# Patient Record
Sex: FEMALE | Race: WHITE | NOT HISPANIC OR LATINO | Employment: UNEMPLOYED | URBAN - NONMETROPOLITAN AREA
[De-identification: names, ages, dates, MRNs, and addresses within clinical notes are randomized per-mention and may not be internally consistent; named-entity substitution may affect disease eponyms.]

---

## 2017-01-23 ENCOUNTER — PRENATAL OFFICE VISIT - GICH (OUTPATIENT)
Dept: OBGYN | Facility: OTHER | Age: 27
End: 2017-01-23

## 2017-01-23 ENCOUNTER — AMBULATORY - GICH (OUTPATIENT)
Dept: SCHEDULING | Facility: OTHER | Age: 27
End: 2017-01-23

## 2017-01-23 ENCOUNTER — HISTORY (OUTPATIENT)
Dept: OBGYN | Facility: OTHER | Age: 27
End: 2017-01-23

## 2017-01-23 ENCOUNTER — COMMUNICATION - GICH (OUTPATIENT)
Dept: OBGYN | Facility: OTHER | Age: 27
End: 2017-01-23

## 2017-01-23 DIAGNOSIS — O99.331 TOBACCO SMOKING COMPLICATING PREGNANCY IN FIRST TRIMESTER: ICD-10-CM

## 2017-01-23 DIAGNOSIS — Z34.81 ENCOUNTER FOR SUPERVISION OF OTHER NORMAL PREGNANCY, FIRST TRIMESTER: ICD-10-CM

## 2017-01-23 DIAGNOSIS — F12.10 CANNABIS ABUSE, UNCOMPLICATED: ICD-10-CM

## 2017-01-23 DIAGNOSIS — N39.0 URINARY TRACT INFECTION: ICD-10-CM

## 2017-01-23 LAB
ABORH - HISTORICAL: NORMAL
ABSOLUTE BASOPHILS - HISTORICAL: 0.1 THOU/CU MM
ABSOLUTE EOSINOPHILS - HISTORICAL: 0.1 THOU/CU MM
ABSOLUTE LYMPHOCYTES - HISTORICAL: 0.9 THOU/CU MM (ref 0.9–2.9)
ABSOLUTE MONOCYTES - HISTORICAL: 0.6 THOU/CU MM
ABSOLUTE NEUTROPHILS - HISTORICAL: 4.2 THOU/CU MM (ref 1.7–7)
ANTIBODY SCREEN - HISTORICAL: NEGATIVE
BASOPHILS # BLD AUTO: 2.3 %
EOSINOPHIL NFR BLD AUTO: 2.1 %
ERYTHROCYTE [DISTWIDTH] IN BLOOD BY AUTOMATED COUNT: 12.9 % (ref 11.5–15.5)
HBSAG CATEGORY - HISTORICAL: NONREACTIVE
HCT VFR BLD AUTO: 40.3 % (ref 33–51)
HEMOGLOBIN: 13.5 G/DL (ref 12–16)
HIV-1/HIV-2 ANTIBODY CATEGORY - HISTORICAL: NORMAL
LYMPHOCYTES NFR BLD AUTO: 15.5 % (ref 20–44)
MCH RBC QN AUTO: 27.9 PG (ref 26–34)
MCHC RBC AUTO-ENTMCNC: 33.5 G/DL (ref 32–36)
MCV RBC AUTO: 83 FL (ref 80–100)
MONOCYTES NFR BLD AUTO: 10 %
NEUTROPHILS NFR BLD AUTO: 70.1 % (ref 42–72)
PLATELET # BLD AUTO: 239 THOU/CU MM (ref 140–440)
PMV BLD: 9.7 FL (ref 6.5–11)
RED BLOOD COUNT - HISTORICAL: 4.84 MIL/CU MM (ref 4–5.2)
RUBELLA COMMENT - HISTORICAL: NORMAL
SPECIMEN EXPIRATION DATE/TIME - HISTORICAL: NORMAL
WEAK D - HISTORICAL: NEGATIVE
WHITE BLOOD COUNT - HISTORICAL: 5.9 THOU/CU MM (ref 4.5–11)

## 2017-01-24 LAB — TREPONEMA PALLIDUM - HISTORICAL: NEGATIVE

## 2017-01-25 LAB
CULTURE - HISTORICAL: ABNORMAL
CULTURE - HISTORICAL: ABNORMAL
SUSCEPTIBILITY RESULT - HISTORICAL: ABNORMAL

## 2017-01-31 ENCOUNTER — AMBULATORY - GICH (OUTPATIENT)
Dept: SCHEDULING | Facility: OTHER | Age: 27
End: 2017-01-31

## 2017-01-31 LAB
6-MONOACETYL MORPHINE - HISTORICAL: ABNORMAL NG/ML
AMPHETAMINE URINE - HISTORICAL: ABNORMAL NG/ML
BARBITURATE URINE - HISTORICAL: ABNORMAL NG/ML
BENZODIAZEPINE URINE - HISTORICAL: ABNORMAL NG/ML
BUPRENORPHRINE URINE - HISTORICAL: ABNORMAL NG/ML
COCAINE METAB URINE - HISTORICAL: ABNORMAL NG/ML
CREAT UR - HISTORICAL: 64 MG/DL
ETHYLGLUCURONIDE URINE - HISTORICAL: ABNORMAL NG/ML
FENTANYL URINE - HISTORICAL: ABNORMAL NG/ML
MASS SPECTROMETRY URINE - HISTORICAL: ABNORMAL
METHADONE URINE - HISTORICAL: ABNORMAL NG/ML
OPIATES URINE - HISTORICAL: ABNORMAL NG/ML
OXYCODONE URINE - HISTORICAL: ABNORMAL NG/ML
PH URINE - HISTORICAL: 7.6
PROPOXYPHENE URINE - HISTORICAL: ABNORMAL NG/ML
THC 50 URINE - HISTORICAL: ABNORMAL NG/ML
TRAMADOL - HISTORICAL: ABNORMAL NG/ML

## 2017-02-09 ENCOUNTER — COMMUNICATION - GICH (OUTPATIENT)
Dept: OBGYN | Facility: OTHER | Age: 27
End: 2017-02-09

## 2017-02-20 ENCOUNTER — HISTORY (OUTPATIENT)
Dept: OBGYN | Facility: OTHER | Age: 27
End: 2017-02-20

## 2017-02-20 ENCOUNTER — AMBULATORY - GICH (OUTPATIENT)
Dept: OBGYN | Facility: OTHER | Age: 27
End: 2017-02-20

## 2017-02-20 ENCOUNTER — HOSPITAL ENCOUNTER (OUTPATIENT)
Dept: RADIOLOGY | Facility: OTHER | Age: 27
End: 2017-02-20
Attending: OBSTETRICS & GYNECOLOGY

## 2017-02-20 ENCOUNTER — PRENATAL OFFICE VISIT - GICH (OUTPATIENT)
Dept: OBGYN | Facility: OTHER | Age: 27
End: 2017-02-20

## 2017-02-20 DIAGNOSIS — Z34.81 ENCOUNTER FOR SUPERVISION OF OTHER NORMAL PREGNANCY, FIRST TRIMESTER: ICD-10-CM

## 2017-02-20 DIAGNOSIS — O34.41 MATERNAL CARE FOR OTHER ABNORMALITIES OF CERVIX, FIRST TRIMESTER: ICD-10-CM

## 2017-02-20 DIAGNOSIS — O09.891 SUPERVISION OF OTHER HIGH RISK PREGNANCIES, FIRST TRIMESTER: ICD-10-CM

## 2017-02-20 DIAGNOSIS — O23.41 INFECTION OF URINARY TRACT IN PREGNANCY IN FIRST TRIMESTER: ICD-10-CM

## 2017-02-20 DIAGNOSIS — F12.10 CANNABIS ABUSE, UNCOMPLICATED: ICD-10-CM

## 2017-03-28 ENCOUNTER — PRENATAL OFFICE VISIT - GICH (OUTPATIENT)
Dept: OBGYN | Facility: OTHER | Age: 27
End: 2017-03-28

## 2017-03-28 ENCOUNTER — HISTORY (OUTPATIENT)
Dept: OBGYN | Facility: OTHER | Age: 27
End: 2017-03-28

## 2017-03-28 DIAGNOSIS — F12.10 CANNABIS ABUSE, UNCOMPLICATED: ICD-10-CM

## 2017-03-28 DIAGNOSIS — Z34.81 ENCOUNTER FOR SUPERVISION OF OTHER NORMAL PREGNANCY, FIRST TRIMESTER: ICD-10-CM

## 2017-03-28 ASSESSMENT — PATIENT HEALTH QUESTIONNAIRE - PHQ9: SUM OF ALL RESPONSES TO PHQ QUESTIONS 1-9: 1

## 2017-04-13 ENCOUNTER — COMMUNICATION - GICH (OUTPATIENT)
Dept: OBGYN | Facility: OTHER | Age: 27
End: 2017-04-13

## 2017-04-13 ENCOUNTER — OFFICE VISIT - GICH (OUTPATIENT)
Dept: FAMILY MEDICINE | Facility: OTHER | Age: 27
End: 2017-04-13

## 2017-04-13 ENCOUNTER — HISTORY (OUTPATIENT)
Dept: FAMILY MEDICINE | Facility: OTHER | Age: 27
End: 2017-04-13

## 2017-04-13 DIAGNOSIS — O99.89 OTHER SPECIFIED DISEASES AND CONDITIONS COMPLICATING PREGNANCY, CHILDBIRTH AND THE PUERPERIUM (CODE): ICD-10-CM

## 2017-04-13 DIAGNOSIS — K59.00 CONSTIPATION: ICD-10-CM

## 2017-04-13 DIAGNOSIS — M54.9 DORSALGIA: ICD-10-CM

## 2017-04-13 LAB
BACTERIA URINE: ABNORMAL BACTERIA/HPF
BILIRUB UR QL: NEGATIVE
CLARITY, URINE: CLEAR CLARITY
COLOR UR: YELLOW COLOR
EPITHELIAL CELLS: ABNORMAL EPI/HPF
GLUCOSE URINE: NEGATIVE MG/DL
KETONES UR QL: NEGATIVE MG/DL
LEUKOCYTE ESTERASE URINE: ABNORMAL
NITRITE UR QL STRIP: NEGATIVE
OCCULT BLOOD,URINE - HISTORICAL: NEGATIVE
PH UR: 6 [PH]
PROTEIN QUALITATIVE,URINE - HISTORICAL: NEGATIVE MG/DL
RBC - HISTORICAL: ABNORMAL /HPF
SP GR UR STRIP: 1.02
UROBILINOGEN,QUALITATIVE - HISTORICAL: NORMAL EU/DL
WBC - HISTORICAL: ABNORMAL /HPF

## 2017-04-20 ENCOUNTER — OFFICE VISIT - GICH (OUTPATIENT)
Dept: CHIROPRACTIC MEDICINE | Facility: OTHER | Age: 27
End: 2017-04-20

## 2017-04-20 ENCOUNTER — HISTORY (OUTPATIENT)
Dept: CHIROPRACTIC MEDICINE | Facility: OTHER | Age: 27
End: 2017-04-20

## 2017-04-20 DIAGNOSIS — M99.02 SEGMENTAL AND SOMATIC DYSFUNCTION OF THORACIC REGION: ICD-10-CM

## 2017-04-20 DIAGNOSIS — M54.50 LOW BACK PAIN: ICD-10-CM

## 2017-04-20 DIAGNOSIS — O99.89 OTHER SPECIFIED DISEASES AND CONDITIONS COMPLICATING PREGNANCY, CHILDBIRTH AND THE PUERPERIUM (CODE): ICD-10-CM

## 2017-04-20 DIAGNOSIS — M54.9 DORSALGIA: ICD-10-CM

## 2017-04-20 DIAGNOSIS — M99.04 SEGMENTAL AND SOMATIC DYSFUNCTION OF SACRAL REGION: ICD-10-CM

## 2017-04-20 DIAGNOSIS — M99.03 SEGMENTAL AND SOMATIC DYSFUNCTION OF LUMBAR REGION: ICD-10-CM

## 2017-04-20 DIAGNOSIS — M54.6 PAIN IN THORACIC SPINE: ICD-10-CM

## 2017-04-20 DIAGNOSIS — S33.6XXA SPRAIN OF SACROILIAC JOINT: ICD-10-CM

## 2017-04-20 DIAGNOSIS — M99.05 SEGMENTAL AND SOMATIC DYSFUNCTION OF PELVIC REGION: ICD-10-CM

## 2017-04-20 DIAGNOSIS — M62.838 OTHER MUSCLE SPASM: ICD-10-CM

## 2017-04-24 ENCOUNTER — OFFICE VISIT - GICH (OUTPATIENT)
Dept: CHIROPRACTIC MEDICINE | Facility: OTHER | Age: 27
End: 2017-04-24

## 2017-04-24 DIAGNOSIS — M54.6 PAIN IN THORACIC SPINE: ICD-10-CM

## 2017-04-24 DIAGNOSIS — M99.04 SEGMENTAL AND SOMATIC DYSFUNCTION OF SACRAL REGION: ICD-10-CM

## 2017-04-24 DIAGNOSIS — M99.05 SEGMENTAL AND SOMATIC DYSFUNCTION OF PELVIC REGION: ICD-10-CM

## 2017-04-24 DIAGNOSIS — M99.02 SEGMENTAL AND SOMATIC DYSFUNCTION OF THORACIC REGION: ICD-10-CM

## 2017-04-24 DIAGNOSIS — S33.6XXA SPRAIN OF SACROILIAC JOINT: ICD-10-CM

## 2017-04-24 DIAGNOSIS — M62.838 OTHER MUSCLE SPASM: ICD-10-CM

## 2017-04-24 DIAGNOSIS — M54.50 LOW BACK PAIN: ICD-10-CM

## 2017-04-25 ENCOUNTER — HISTORY (OUTPATIENT)
Dept: EMERGENCY MEDICINE | Facility: OTHER | Age: 27
End: 2017-04-25

## 2017-04-28 ENCOUNTER — HOSPITAL ENCOUNTER (OUTPATIENT)
Dept: RADIOLOGY | Facility: OTHER | Age: 27
End: 2017-04-28
Attending: OBSTETRICS & GYNECOLOGY

## 2017-04-28 DIAGNOSIS — Z34.81 ENCOUNTER FOR SUPERVISION OF OTHER NORMAL PREGNANCY, FIRST TRIMESTER: ICD-10-CM

## 2017-05-01 ENCOUNTER — COMMUNICATION - GICH (OUTPATIENT)
Dept: OBGYN | Facility: OTHER | Age: 27
End: 2017-05-01

## 2017-06-27 ENCOUNTER — AMBULATORY - GICH (OUTPATIENT)
Dept: SCHEDULING | Facility: OTHER | Age: 27
End: 2017-06-27

## 2017-06-28 ENCOUNTER — COMMUNICATION - GICH (OUTPATIENT)
Dept: FAMILY MEDICINE | Facility: OTHER | Age: 27
End: 2017-06-28

## 2017-12-28 NOTE — TELEPHONE ENCOUNTER
Patient Information     Patient Name MRN Priscilla Hills [id number] Female 1990      Telephone Encounter by Tahmina Garcia at 2017 10:24 AM     Author:  Tahmina Garcia Service:  (none) Author Type:  (none)     Filed:  2017 10:29 AM Encounter Date:  2017 Status:  Signed     :  Tahmina Garcia            JOSS CALLED FROM [facility] REQUESTING A REFERRAL FOR THIS PATIENT. IT IS UNKNOWN WHY SHE WAS IN THAT AREA. SHE WAS SEEN FOR Z34.83 SUPERVISION OF NORMAL PREGNANCY AT 29 WEEKS WITH KELTON RADFORD ON 2017. WILL YOU APPROVE THIS REFERRAL? THANK YOU, Tahmina Garcia ....................  2017   10:28 AM

## 2017-12-28 NOTE — TELEPHONE ENCOUNTER
Patient Information     Patient Name MRN Sex Priscilla Nichole [id number] Female 1990      Telephone Encounter by Zayra Dugan MD at 2017 12:16 PM     Author:  Zayra Dugan MD Service:  (none) Author Type:  Physician     Filed:  2017 12:17 PM Encounter Date:  2017 Status:  Signed     :  Zayra Dugan MD (Physician)            Ok.  Zayra Dugan MD ....................  2017   12:16 PM

## 2017-12-28 NOTE — TELEPHONE ENCOUNTER
Patient Information     Patient Name MRN Sex Priscilla Nichole [id number] Female 1990      Telephone Encounter by Joshua Rasmussen LPN at 2017 12:20 PM     Author:  Joshua Rasmussen LPN Service:  (none) Author Type:  NURS- Licensed Practical Nurse     Filed:  2017 12:20 PM Encounter Date:  2017 Status:  Signed     :  Joshua Rasmussen LPN (NURS- Licensed Practical Nurse)            Called [place] and let them know the referral was placed.  Joshua Rasmussen LPN ..............2017 12:20 PM

## 2018-01-03 NOTE — NURSING NOTE
Patient Information     Patient Name MRN Priscilla Hills [id number] Female 1990      Nursing Note by Em George at 2017  8:45 AM     Author:  Em George Service:  (none) Author Type:  (none)     Filed:  2017  8:32 AM Encounter Date:  2017 Status:  Signed     :  Em George            Patient presents today for routine ob visit. 2 Babystep coupons given.  Em George LPN...............2017   8:31 AM

## 2018-01-03 NOTE — PROGRESS NOTES
"Patient Information     Patient Name MRN Priscilla Hills [id number] Female 1990      Progress Notes by Gregory Wilkinson MD at 2017  8:45 AM     Author:  Gregory Wilkinson MD Service:  (none) Author Type:  Physician     Filed:  2017  2:16 PM Encounter Date:  2017 Status:  Signed     :  Gregory Wilkinson MD (Physician)            Problem list:  1.   2. MBT = O negative, antibody negative, Du negative  3. Rubella immune  4. E. Coli UTI, first trimester  5. Drug use, pregnancy  6. Needs Flu & Tdap vaccines  7. Baby doc?    Estimated Date of Delivery: 17  10w5d    Visit Vitals       /78     Pulse 64     Ht 1.613 m (5' 3.5\")     Wt 51.4 kg (113 lb 4 oz)     LMP 2016 (Exact Date)     Breastfeeding No     BMI 19.75 kg/m2     (Z34.81) Prenatal care, subsequent pregnancy in first trimester  (primary encounter diagnosis)  Comment: Labs reviewed.  Has LGSIL pap, colpo later today.  Has e. Coli UTYI  Plan: F/U in 4 weeks    (O23.41) UTI (urinary tract infection) during pregnancy, first trimester  Comment: found on screening UA  Plan: Macrobid for 5 days    (F12.10) Marijuana use  Comment: positive screen  Plan: discuss at colpo appointment later today    (O09.891) Rh negative state in antepartum period, first trimester  Comment: antibody & Du negative  Plan: will need Rhogam later in pregnancy            "

## 2018-01-03 NOTE — PROGRESS NOTES
Patient Information     Patient Name MRN Sex Priscilla Nichole [id number] Female 1990      Progress Notes by Gregory Wilkinson MD at 2017 10:30 AM     Author:  Gregory Wilkinson MD Service:  (none) Author Type:  Physician     Filed:  2017 11:21 AM Encounter Date:  2017 Status:  Signed     :  Gregory Wilkinson MD (Physician)            PRENATAL VISIT   FIRST OBSTETRICAL EXAM - OB    Priscilla Garcia is a 26 y.o. y.o. female, , is here today for her First Obstetrical Exam. Ethnicity: /White.      6 - 14 WEEKS: [place] Pregnancy Risk Assessment Form completed and Urine Culture Ordered     MENSTRUAL HISTORY  LMP:  Patient's last menstrual period was 2016 (exact date).  Date Reliability:definite  Menses Every: regular, every 28-30 days    RISK FACTORS  Exercise Times/wk: 0  Seat Belt Use: 100%  Alcohol/day: 0  Current Drug Use: none  Birth Control Method: none  High Risk Behavior: none    Infection History:/Exposures:  1. Lead or Chemicals?  no  2.  Radiatio nExposure?  no  3.  Infections (hospital, lab work, day care, teaching)?  no  4.  Toxoplasmosis (cats)?  Yes, no litter  5.  Lives with someone with TB or TB exposed?  no    REVIEW OF SYSTEMS:  Full review of systems negative other than those issues mentioned in HPI above.    Past Medical History      Diagnosis   Date     Anxiety       Colloid cyst of brain (HC)       patient reported      Endometriosis         Past Medical History  Herpes?  no  Diabetes?  no  High Blood Pressure?  no  Heart Disease?  no  Chicken pox?  Yes, in childhood  Autoimmune Disease?  no  Neuro/Epilepsy?  no  Kidney Disease/UTI?  no  Mental Illness?  no  Hepatitis/Liver Disease?  no  Thyroid Disease?  no  Trauma?  no  Domestic violence?  no  Chronic Back Pain?  no  Anemia?  no  Infertility?  no  Eating Disorder?  no  Anesthetic Reactions?  no  Back surgery/Scoliosis?  no    Current Outpatient Prescriptions on File Prior to  "Visit       Medication  Sig Dispense Refill     Breast Pump - Purchase  1 unit 0     No current facility-administered medications on file prior to visit.        Past Surgical History       Procedure   Laterality Date     Pelvic laparoscopy   2009     pelvic endometriosis         Social History     Social History        Marital status:  Single     Spouse name: engaged     Number of children:  0     Years of education:  <HS grad     Occupational History       works on cars- selfemployed      Social History Main Topics         Smoking status:   Current Every Day Smoker     Packs/day:  0.25     Years:  1.50     Types:  Cigarettes     Smokeless tobacco:   Never Used      Comment: cutting  back      Alcohol use   No      Comment: occasionally      Drug use:   No      Comment: denies drug use      Sexual activity:   Yes     Partners:  Male      Comment: No birth control in use; Same partner x 6 years (9/21/11)      Other Topics  Concern     Not on file      Social History Narrative     Single. She currently is unemployed. She smokes less than one pack of cigarettes a day.      Family Support at home?  yes  Housing Concerns?  no    No family history on file.          Allergies     Allergen  Reactions     Asprin Ec Low Dose [Aspirin] Bleeding     Coconut Oil Rash and Angioedema     Latex *Unknown       Vitals:     01/23/17 1052   BP: 102/64   Pulse: 60   Weight: 51.4 kg (113 lb 6 oz)   Height: 1.613 m (5' 3.5\")       PHYSICAL EXAM  General Appearance:  Alert, appropriate appearance for age. No acute distress  HEENT Exam:  Grossly normal.  Neck / Thyroid Exam:  Supple, no masses, nodes or enlargement.  Chest/Respiratory Exam: Normal chest wall and respirations. Clear to auscultation.  Breast Exam: No dimpling, nipple retraction or discharge. No masses or nodes.  Cardiovascular Exam: Regular rate and rhythm. S1, S2, no murmur, click, gallop, or rubs.  Gastrointestinal Exam: Soft, non-tender, no masses or organomegaly.  Skin: " "no rash or abnormalities  Psychiatric Exam: Alert and oriented, appropriate affect.  Pelvic Exam Female: Vulva and vagina appear normal. Bimanual exam reveals normal uterus and adnexa. \"Lymphatic Exam: Non-palpable nodes in neck, clavicular, axillary, or inguinal regions.  Musculoskeletal Exam: Back is straight and non-tender  Neurologic Exam: Normal speech, no tremor.  Fundal Height: not palpable  Presentation: early in pregnancy   Fetal HR: not attempted    Fetal Movements: not moved yet  Edema: none    ASSESSMENT/PLAN:  No diagnosis found.     Discussed orientation, general information, lifestyle, nutrition, exercise, warning signs, resources, lab testing, risk screening, discussed cystic fibrosis screening and future appointments with patient.  Labs ordered including cultures/pap as appropriate.  Questions answered.    Return to office in 4 week(s) for follow up.    Gregory Wilkinson MD  11:01 AM 1/23/2017           "

## 2018-01-03 NOTE — TELEPHONE ENCOUNTER
Patient Information     Patient Name MRN Priscilla Hills [id number] Female 1990      Telephone Encounter by Diana Lepe at 2017 12:06 PM     Author:  Diana Lepe Service:  (none) Author Type:  (none)     Filed:  2017 12:06 PM Encounter Date:  2017 Status:  Signed     :  Diana Lepe            Please place orders for Pap. Thanks lab

## 2018-01-03 NOTE — TELEPHONE ENCOUNTER
Patient Information     Patient Name MRN Sex Priscilla Nichole [id number] Female 1990      Telephone Encounter by Sarah Kumar at 2017  2:45 PM     Author:  Sarah Kumar Service:  (none) Author Type:  NURS- Registered Nurse     Filed:  2017  2:46 PM Encounter Date:  2017 Status:  Signed     :  Sarah Kumar (NURS- Registered Nurse)            Please place pap order. Sarah Kumar RN .............. 2017  2:46 PM

## 2018-01-03 NOTE — PROGRESS NOTES
Patient Information     Patient Name MRN Sex Priscilla Nichole [id number] Female 1990      Progress Notes by Gregory Wilkinson MD at 2017 12:45 PM     Author:  Gregory Wilkinson MD Service:  (none) Author Type:  Physician     Filed:  2017  1:09 PM Encounter Date:  2017 Status:  Signed     :  Gregory Wilkinson MD (Physician)            Priscilla presents due to her recent abnormal Pap smear completed at her new ob appointment.  LGSIL    REVIEW OF SYSTEMS:  Full review of systems negative other than those issues mentioned in HPI above.    Past Medical History      Diagnosis   Date     Anxiety       Colloid cyst of brain (HC)       patient reported      Endometriosis         Current Outpatient Prescriptions on File Prior to Visit       Medication  Sig Dispense Refill     Breast Pump - Purchase  1 unit 0     nitrofurantoin macrocrystals/monohydrate (MACROBID) 100 mg capsule Take 1 capsule by mouth 2 times daily. 5 capsule 0     nitrofurantoin macrocrystals/monohydrate (MACROBID) 100 mg capsule Take 1 capsule by mouth 2 times daily. 10 capsule 0     No current facility-administered medications on file prior to visit.        Past Surgical History       Procedure   Laterality Date     Pelvic laparoscopy   2009     pelvic endometriosis         Social History     Social History        Marital status:  Single     Spouse name: engaged     Number of children:  0     Years of education:  <HS grad     Occupational History       works on cars- selfemployed      Social History Main Topics         Smoking status:   Current Every Day Smoker     Packs/day:  0.25     Years:  1.50     Types:  Cigarettes     Smokeless tobacco:   Never Used      Comment: cutting  back      Alcohol use   No      Comment: occasionally      Drug use:   No      Comment: denies drug use      Sexual activity:   Yes     Partners:  Male      Comment: No birth control in use; Same partner x 6 years (11)      Other Topics   "Concern     Not on file      Social History Narrative     Single. She currently is unemployed. She smokes less than one pack of cigarettes a day.        No family history on file.    Allergies     Allergen  Reactions     Asprin Ec Low Dose [Aspirin] Bleeding     Coconut Oil Rash and Angioedema     Latex *Unknown       Vitals:     02/20/17 1241   BP: 110/78   Pulse: 64   Weight: 51.4 kg (113 lb 4 oz)   Height: 1.613 m (5' 3.5\")       COLPOSCOPY EXAM - GYN    PELVIC EXAM  Vulva:  grossly unremarkable  Vagina: normal size and caliber  Cervix: no lesions or masses     COLPOSCOPY   Consent was obtained prior to the procedure.  Patient was placed in dorsal lithotomy position, speculum inserted and the colposcope focused on the cervix which was/were visualized under regular and green-filtered light.  The colposcopy was satisfactory.  Observation without staining reveals no gross lesions.  Acetic acid was applied to the cervix as needed to further delineate any lesions.  acetowhite lesion(s) noted at 9-7 o'clock and punctation noted at 10, 12 & 6 o'clock Representative biopsies were performed.  Endocervical Curettage was: not performed.  Entire squamocolumnar junction visualized: yes    Specimen(s) were labeled and sent to pathology for pathologic correlation.  Hemostasis was attained with silver nitrate.  The patient tolerated the procedure well and without difficulty.  Clinical staff offered to be present for exam: yes, nursing staff present.    IMPRESSION  acetowhite lesion(s) noted at 9-7 o'clock and punctation noted at 10, 12 & 6 o'clock - Colposcopy performed today.  Await final pathology report on tissue submitted.  Patient instructed to notify office if she develops heavy vaginal bleeding, severe pain, fever or foul smelling discharge.    Nothing per vagina; no sex, tampons or douching for 1 week.    PLAN  Further management pending above biopsy results.  We'll review results at her next ob appointment.    Gregory" MD Minh  1:03 PM 2/20/2017

## 2018-01-03 NOTE — NURSING NOTE
Patient Information     Patient Name MRN Priscilla Hills [id number] Female 1990      Nursing Note by Em George at 2017 10:30 AM     Author:  Em George Service:  (none) Author Type:  (none)     Filed:  2017 10:56 AM Encounter Date:  2017 Status:  Signed     :  Em George            Patient presents today for OB PX.   Em George LPN...............2017   10:48 AM

## 2018-01-03 NOTE — NURSING NOTE
Patient Information     Patient Name MRN Sex Priscilla Nichole [id number] Female 1990      Nursing Note by Em George at 2017 12:45 PM     Author:  Em George Service:  (none) Author Type:  (none)     Filed:  2017 12:41 PM Encounter Date:  2017 Status:  Signed     :  Em George            Patient presents today for her colposcopy. [place] Protocol    A. Pre-procedure verification complete yes  1-relevant information / documentation available, reviewed and properly matched to the patient; 2-consent accurate and complete, 3-equipment and supplies available    B. Site marking complete Yes  Site marked if not in continuous attendance with patient    C. TIME OUT completed yes  Time Out was conducted just prior to starting procedure to verify the eight required elements: 1-patient identity, 2-consent accurate and complete, 3-position, 4-correct side/site marked (if applicable), 5-procedure, 6-relevant images / results properly labeled and displayed (if applicable), 7-antibiotics / irrigation fluids (if applicable), 8-safety precautions.  Em George LPN...............2017   12:41 PM

## 2018-01-03 NOTE — PROGRESS NOTES
Patient Information     Patient Name MRN Sex Priscilla Nichole [id number] Female 1990      Progress Notes by Mary Jo [facility] R.T. [facility] at 2017  8:01 AM     Author:  Mary Jo Suazo R.T. ([facility]RT) Service:  (none) Author Type:  [employer] - Registered Radiologic Technologist     Filed:  2017  8:01 AM Date of Service:  2017  8:01 AM Status:  Signed     :  Mary Jo Suazo R.T. (ARRT) ([facility] - Registered Radiologic Technologist)            Falls Risk Criteria:    Age 65 and older or under age 4        Sensory deficits    Poor vision    Use of ambulatory aides    Impaired judgment    Unable to walk independently    Meets High Risk criteria for falls:  no

## 2018-01-03 NOTE — TELEPHONE ENCOUNTER
Patient Information     Patient Name MRN Priscilla Hills [id number] Female 1990      Telephone Encounter by Sarah Kumar at 2017  3:56 PM     Author:  Sarah Kumar Service:  (none) Author Type:  NURS- Registered Nurse     Filed:  2017  4:03 PM Encounter Date:  2017 Status:  Signed     :  Sarah Kumar (NURS- Registered Nurse)            Patient notified of results pap shows LGSIL.  Patient will schedule a Colposcopy.  Sarah Kumar RN .............. 2017  4:02 PM

## 2018-01-03 NOTE — ADDENDUM NOTE
Patient Information     Patient Name MRN Priscilla Hills [id number] Female 1990      Addendum Note by Simon Wilkinson MD at 2017  3:16 PM     Author:  Simon Wilkinson MD Service:  (none) Author Type:  Physician     Filed:  2017  3:16 PM Encounter Date:  2017 Status:  Signed     :  Simon Wilkinson MD (Physician)       Addended by: SIMON WILKINSON on: 2017 03:16 PM        Modules accepted: Orders

## 2018-01-03 NOTE — NURSING NOTE
Patient Information     Patient Name MRN Priscilla Hills [id number] Female 1990      Nursing Note by Sarah Kumar at 2017  8:45 AM     Author:  Sarah Kumar Service:  (none) Author Type:  NURS- Registered Nurse     Filed:  2017 10:41 AM Encounter Date:  2017 Status:  Signed     :  Sarah Kumar (NURS- Registered Nurse)            [facility] called Rx for Marcobid 100mg capsule 1 capsule by mouth 2 times daily, with quantity of #5 was sent to them, talked with Dr. Wilkinson this should have been #10 pharmacist notified at [facility].  Sarah Kumar RN .............. 2017  10:39 AM

## 2018-01-04 NOTE — PROGRESS NOTES
Patient Information     Patient Name MRN Sex Priscilla Nichole [id number] Female 1990      Progress Notes by Zayra Dugan MD at 2017  1:30 PM     Author:  Zayra Dugan MD Service:  (none) Author Type:  Physician     Filed:  2017  6:37 PM Encounter Date:  2017 Status:  Signed     :  Zayra Dugan MD (Physician)            SUBJECTIVE:    Priscilla Garcia is a 26 y.o. female who presents for left SI joint pain.  Radiates to her left thigh.  Started yesterday around 10-11 am.  No dysuria, frequency or urgency.  Had a urinary tract infection a coiuple of months ago.  No fever or hematuria.  Has had some constipation lately.  Had a bowel movement today.  Sometimes it hurts in her back when she needs to have a bowel movement.  Her bowel movements aren't hard, but has to strain with a bowel movement and hurts in her tailbone area when she is trying to have a bowel movement.  Tripped over her cat a week or so ago.      HPI  I personally reviewed medications/allergies/history listed below:     Allergies      Allergen   Reactions     Asprin Ec Low Dose [Aspirin]  Bleeding     Coconut Oil  Rash and Angioedema     Latex  Other - Describe In Comment Field     Local reaction of irritation/numbness    , No family history on file.,   No current outpatient prescriptions on file prior to visit.     No current facility-administered medications on file prior to visit.    ,   Past Medical History:     Diagnosis  Date     Anxiety      Colloid cyst of brain (HC)     patient reported      Endometriosis    ,   Patient Active Problem List     Diagnosis  Code     Nausea R11.0     Rh negative state in antepartum period O09.899     SGA (small for gestational age) P05.00     Marijuana use F12.10    and   Past Surgical History:      Procedure  Laterality Date     PELVIC LAPAROSCOPY      pelvic endometriosis       Social History     Social History        Marital status:   Single     Spouse name: engaged     Number of children:  0     Years of education:  <HS grad     Occupational History       works on cars- selfemployed      Social History Main Topics         Smoking status:   Current Every Day Smoker     Packs/day:  0.25     Years:  1.50     Types:  Cigarettes     Smokeless tobacco:   Never Used      Comment: cutting  back      Alcohol use   No      Comment: occasionally      Drug use:   No      Comment: denies drug use      Sexual activity:   Yes     Partners:  Male      Comment: No birth control in use; Same partner x 6 years (9/21/11)      Other Topics  Concern     Not on file      Social History Narrative     Single. She currently is unemployed. She smokes less than one pack of cigarettes a day.       REVIEW OF SYSTEMS:  Review of Systems   All other systems reviewed and are negative.      OBJECTIVE:  /68  Temp 97.7  F (36.5  C) (Tympanic)  Wt 55.2 kg (121 lb 9.6 oz)  LMP 12/06/2016 (Exact Date)  Breastfeeding? No  BMI 21.2 kg/m2    EXAM:   Physical Exam   Constitutional: She is well-developed, well-nourished, and in no distress.   HENT:   Head: Normocephalic.   Eyes: Pupils are equal, round, and reactive to light.   Neck: Normal range of motion. Neck supple. No thyromegaly present.   Cardiovascular: Normal rate, regular rhythm and normal heart sounds.    No murmur heard.  Pulmonary/Chest: Effort normal and breath sounds normal. No respiratory distress. She has no wheezes. She has no rales.   Abdominal:   Fetal heart tones 147 bpm.  No belly pain with palpation.   Musculoskeletal:   Pain over left SI joint. No right SI tenderness. No sciatic notch tenderness. Normal lower extremity strength.   Lymphadenopathy:     She has no cervical adenopathy.   Psychiatric: Affect normal.   PHQ Depression Screen  Date of PHQ exam: 04/13/17  Over the last 2 weeks, how often have you been bothered by any of the following problems?  1. Little interest or pleasure in doing things: 0 -  Not at all  2. Feeling down, depressed, or hopeless: 0 - Not at all                                         I personally reviewed results with patient as listed below:  Results for orders placed or performed in visit on 04/13/17      URINALYSIS W REFLEX MICROSCOPIC IF POSITIVE      Result  Value Ref Range    COLOR                     Yellow Yellow Color    CLARITY                   Clear Clear Clarity    SPECIFIC GRAVITY,URINE    1.020 1.010, 1.015, 1.020, 1.025                    PH,URINE                  6.0 6.0, 7.0, 8.0, 5.5, 6.5, 7.5, 8.5                    UROBILINOGEN,QUALITATIVE  Normal Normal EU/dl    PROTEIN, URINE Negative Negative mg/dL    GLUCOSE, URINE Negative Negative mg/dL    KETONES,URINE             Negative Negative mg/dL    BILIRUBIN,URINE           Negative Negative                    OCCULT BLOOD,URINE        Negative Negative                    NITRITE                   Negative Negative                    LEUKOCYTE ESTERASE        Small (A) Negative                   URINALYSIS MICROSCOPIC      Result  Value Ref Range    RBC 0-2 0-2, None Seen /HPF    WBC 0-2 0-2, 3-5, None Seen /HPF    BACTERIA                  Many (A) None Seen, Rare, Occasional, Few Bacteria/HPF    EPITHELIAL CELLS          None Seen None Seen, Few Epi/HPF       ASSESSMENT/PLAN:    ICD-10-CM    1. Pregnancy with back pain, antepartum O99.89 URINALYSIS W REFLEX MICROSCOPIC IF POSITIVE     M54.9 URINALYSIS W REFLEX MICROSCOPIC IF POSITIVE      URINALYSIS MICROSCOPIC      URINALYSIS MICROSCOPIC      AMB CONSULT TO CHIROPRACTIC   2. Constipation, unspecified constipation type K59.00 polyethylene glycoL (MIRALAX) 17 gram/dose powder        Plan:    1. Pain seems to be emanating from her left SI joint region. UA was normal. Suspect this is mechanical due to pregnancy. Referred to [facility] or chiropractic care.  2. Trial of MiraLAX. The constipation is probably aggravating her back pain as well.  Zayra Dugan MD

## 2018-01-04 NOTE — TELEPHONE ENCOUNTER
Patient Information     Patient Name MRN Sex Priscilla Nichole [id number] Female 1990      Telephone Encounter by Joshua Rasmussen LPN at 2017  8:53 AM     Author:  Joshua Rasmussen LPN Service:  (none) Author Type:  NURS- Licensed Practical Nurse     Filed:  2017  8:53 AM Encounter Date:  2017 Status:  Signed     :  Joshua Rasmussen LPN (NURS- Licensed Practical Nurse)            Left message to call back  ...................Joshua Rasmussen LPN....2017 8:53 AM

## 2018-01-04 NOTE — NURSING NOTE
Patient Information     Patient Name MRN Priscilla Hills [id number] Female 1990      Nursing Note by Delphine Barkley at 3/28/2017  4:15 PM     Author:  Delphine Barkley Service:  (none) Author Type:  (none)     Filed:  3/28/2017  4:23 PM Encounter Date:  3/28/2017 Status:  Signed     :  Delphine Barkley            Patient here for OB check. C/o cramping and low back pain today.  Delphine Barkley LPN..............................3/28/2017  4:17 PM

## 2018-01-04 NOTE — TELEPHONE ENCOUNTER
Patient Information     Patient Name MRN Priscilla Hills [id number] Female 1990      Telephone Encounter by Flakita Baltazar at 2017  7:26 AM     Author:  Flakita Baltazar Service:  (none) Author Type:  (none)     Filed:  2017  7:28 AM Encounter Date:  2017 Status:  Signed     :  Flakita Baltazar            Please call patient as she is having pain in the  left side of her  hip and back also pain down the leg. She is not sure what is going on .   Flakita Baltazar ....................  2017   7:27 AM

## 2018-01-04 NOTE — PATIENT INSTRUCTIONS
Patient Information     Patient Name MRN Priscilla Hills [id number] Female 1990      Patient Instructions by Elise Cross DC at 2017  4:00 PM     Author:  Elise Cross DC Service:  (none) Author Type:  INT THER- Other provider     Filed:  2017  5:23 PM Encounter Date:  2017 Status:  Signed     :  Elise Cross DC (INT THER- Other provider)              Plan of Care: SMT and adjunctive therapies/modalities at 1-2xweek for up to 4 weeks until symptoms resolved/decreased to reasonable level.  Re-eval in 1 month approx 2017 .    INSTRUCTIONS:   Take breaks to get off feet, put foot on ledge when standing washing dishes.   Drink plenty of water.   Apply ice to area for 15-20 min. To reduce pain, swelling, and inflammation.  Repeat every 2 hours as needed.   Remain physically active within tolerance.    Return for follow-up of initial visit within 2-4 days.

## 2018-01-04 NOTE — PROGRESS NOTES
Patient Information     Patient Name MRN Sex Priscilla Nichole [id number] Female 1990      Progress Notes by Gregory Wilkinson MD at 3/28/2017  4:15 PM     Author:  Gregory Wilkinson MD Service:  (none) Author Type:  Physician     Filed:  3/28/2017  4:34 PM Encounter Date:  3/28/2017 Status:  Signed     :  Gregory Wilkinson MD (Physician)            Problem list:  1.   2. MBT = O negative, antibody negative, Du negative  3. Rubella immune  4. E. Coli UTI, first trimester  5. Drug use, pregnancy  6. Tdap vaccines, Flu vaccine done 3/28/2017  7. Baby doc?    Estimated Date of Delivery: 17  15w6d    Visit Vitals       /70     Pulse 88     Wt 54.3 kg (119 lb 9.6 oz)     LMP 2016 (Exact Date)     BMI 20.85 kg/m2     (Z34.81) Prenatal care, subsequent pregnancy in first trimester  (primary encounter diagnosis)  Comment: no complaints.  Reviewed colpo results.  Both biopsies show DEBRA I, recheck postpartum  Plan: FLU VACCINE => 3 PRESERV FREE QUADRIVALENT IIV4        IM, US OB BASIC FETAL ANATOMY SCREEN SINGLE TA,        IA ADMIN VACC INITIAL SEASONAL AFFILIATE ONLY,         CANCELED: US OB BASIC FETAL ANATOMY SCREEN         SINGLE TA        F/U in 4 weeks    (F12.10) Marijuana use  Comment: continues to use  Plan: drug screen at next visit

## 2018-01-04 NOTE — PROGRESS NOTES
Patient Information     Patient Name MRN Sex Priscilla Nichole [id number] Female 1990      Progress Notes by Elise Cross DC at 2017  4:00 PM     Author:  Elise Cross DC Service:  (none) Author Type:  INT THER- Other provider     Filed:  2017  5:23 PM Encounter Date:  2017 Status:  Signed     :  Elise Cross DC (INT THER- Other provider)            PATIENT:  Priscilla Garcia is a 26 y.o. female  at 19 weeks pregnant.  Referred by Dr. Dugan    PROBLEM:   Chief Complaint     Patient presents with       Pelvis Pain/problem      Left hip to left knee     Back Pain/problem      thoracic     2017 Visit #1    SUBJECTIVE / HPI:   Description, Duration and Location of Primary Problem: L sacroiliac pain   Mechanism of Injury:  started hurting 9 days ago.  [facility] tripped [facility]    Duration and Frequency of Pain: frequently   Radiation of pain: yes, into left lateral thigh to knee and up into left lumbar.    Pain rated 6/10 at it's worst   and 3/10 at it's best.  Pain course: unchanged  Worse with: stepping left leg over baby gate, standing washing dishes, playing with or lifting 2 kids.   Improved by: exercises-squats, bridges, quadruped hip extension  Previous injury:  After 2nd child was born her hip was out and saw a chiropractor to align it.  Had some similar sciatica pain at end of first pregnancy.    No bowel or bladder incontinence, saddle anesthesia or lower extremity weakness or numbness.      Other Secondary/Associated Problems  Description, Duration and Location of Problem: upper back/scapular muscle tension, constant.    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  Pain course: unchanged  Worse with:  "lifting  Improved by: nothing tried    Subjective  4/20/2017 Neck index 12% Back index 44%  See outcome assessment tools/indices scanned in chart.     Review of Systems:  The patient denies any fevers, chills, nausea, vomiting, diarrhea, constipation, dysuria, hematuria, or urinary hesitancy or incontinence.  No shortness of breath, chest pain, or rashes.    Patient Active Problem List     Diagnosis  Code     Nausea R11.0     Rh negative state in antepartum period O09.899     SGA (small for gestational age) P05.00     Marijuana use F12.10       Past Medical History:     Diagnosis  Date     Anxiety      Colloid cyst of brain (HC)     patient reported      Endometriosis        Past Surgical History:      Procedure  Laterality Date     PELVIC LAPAROSCOPY  2009    pelvic endometriosis       FAMILY HISTORY:  Negative/unremarkable except as detailed in HPI.    Current Outpatient Prescriptions:      polyethylene glycoL (MIRALAX) 17 gram/dose powder, Take 17 g by mouth once daily if needed for Constipation., Disp: 1 jar, Rfl: 11     prenatal vit#112-folic acid 1 mg chew, Take 1 tablet by mouth 2 times daily., Disp: , Rfl:   Medications have been reviewed by me and are current to the best of my knowledge and ability.    Allergies      Allergen   Reactions     Asprin Ec Low Dose [Aspirin]  Bleeding     Coconut Oil  Rash and Angioedema     Latex  Other - Describe In Comment Field     Local reaction of irritation/numbness       OBJECTIVE:  /66  Pulse 96  Temp 98.8  F (37.1  C) (Temporal)  Resp 20  Ht 1.638 m (5' 4.5\")  Wt 56.6 kg (124 lb 12.8 oz)  LMP 12/06/2016 (Exact Date)  BMI 21.09 kg/m2  Nursing notes and vitals reviewed.       General Appearance: Pleasant, alert, appropriate appearance for age. No acute distress. Gravid.    Posture: Overall gait and posture with in normal limits.  Anterior head carriage,  thoracic kyphosis, and increased lumbar lordosis.    Low L iliac crest, protracted bilateral shoulder " blades, level head.     Neurologic-  -SLR    Musculoskeletal:  Cervical Not assessed today.   Thoracic ROM: restricted extension    Lumbar ROM: restricted and low back pain on flexion, sacral dysfunction on extension    Gillets: +MANUEL, RADHA  L pinching/catching on weight bearing  ERWIN:  Negative bilateral    Tenderness over T2-3, T6-7 spinal levels and L SI joint(s). No sacral sciatic notch tenderness.   Muscle spasm: moderate midthoracic, lumbar paraspinals  Myofascial spasm and tenderness:  R round ligament  +Joint asymmetry and restriction: T2 L, T7 ext, MANUEL, RADHA      ASSESSMENT:      ICD-10-CM    1. Segmental and somatic dysfunction of sacral region M99.04 NH CHIRO SPINAL MANIP 3-4 REGIONS [facility] ONLY   2. Sacroiliac sprain, initial encounter S33.6XXA NH CHIRO SPINAL MANIP 3-4 [facility] ONLY   3. Pregnancy with back pain, antepartum O99.89 AMB CONSULT TO CHIROPRACTIC     M54.9 NH CHIRO SPINAL MANIP 3-4 REGIONS G[facility] ONLY   4. Segmental and somatic dysfunction of pelvic region M99.05 NH CHIRO SPINAL MANIP 3-4 REGIONS GICH ONLY   5. Segmental and somatic dysfunction of lumbar region M99.03 NH CHIRO SPINAL MANIP 3-4 REGIONS G[facility] ONLY   6. Acute left-sided low back pain without sciatica M54.5 NH CHIRO SPINAL MANIP 3-4 REGIONS GICH ONLY   7. Segmental and somatic dysfunction of thoracic region M99.02 NH CHIRO SPINAL MANIP 3-4 REGIONS GICH ONLY   8. Acute bilateral thoracic back pain M54.6 NH CHIRO SPINAL MANIP 3-4 REGIONS G[facility] ONLY   9. Spasm of muscle M62.838 NH CHIRO SPINAL MANIP 3-4 REGIONS GICH ONLY       PLAN:   SMT to noted levels using manual Diversified and side posture lumbo pelvic adjustments.   Myofascial release: <1 min. R round ligament.   Brief review of home self directed exercises 3 min.    Response: no pinching pain L leg weight bearing    Plan of Care: SMT and adjunctive therapies/modalities at 1-2xweek for up to 4 weeks until symptoms resolved/decreased to reasonable level.  Re-eval in 1 month  approx 5/20/2017 .    INSTRUCTIONS:   Take breaks to get off feet, put foot on ledge when standing washing dishes.   Drink plenty of water.   Apply ice to area for 15-20 min. To reduce pain, swelling, and inflammation.  Repeat every 2 hours as needed.   Remain physically active within tolerance.    Return for follow-up of initial visit within 2-4 days.

## 2018-01-04 NOTE — PATIENT INSTRUCTIONS
Patient Information     Patient Name MRN Sex Priscilla Nichole [id number] Female 1990      Patient Instructions by Elise Cross DC at 2017  4:00 PM     Author:  Elise Cross DC Service:  (none) Author Type:  INT THER- Other provider     Filed:  2017  4:42 PM Encounter Date:  2017 Status:  Signed     :  Elise Cross DC (INT THER- Other provider)            Plan of Care: SMT and adjunctive therapies/modalities at 1-2xweek for up to 4 weeks until symptoms resolved/decreased to reasonable level.  Re-eval in 1 month approx 2017 .    INSTRUCTIONS:   TSleep on side with pillow between knees and ankles.   Take breaks to get off feet, put foot on ledge when standing washing dishes.   Drink plenty of water.   Apply ice to area for 15-20 min. To reduce pain, swelling, and inflammation.  Repeat every 2 hours as needed.   Remain physically active within tolerance.    Return for follow-up within 2-4 days.

## 2018-01-04 NOTE — TELEPHONE ENCOUNTER
"Patient Information     Patient Name MRN Priscilla Hills [id number] Female 1990      Telephone Encounter by Britni Mallory RN at 2017  8:31 AM     Author:  Britni Mallory RN Service:  (none) Author Type:  (none)     Filed:  2017  8:38 AM Encounter Date:  2017 Status:  Signed     :  Britni Mallory RN (NURS- Registered Nurse)            Patient states that yesterday she started to note an increase in pain in her left hip. Now today patient reports that pain in left hip is severe and radiates into her lower back and shoots down her leg. Patient reports that she feels she cannot lift her left leg as easily or as high as she can lift her right leg. Patient is currently not using any medications but is attempting to relieve pain with hot baths, hot pads, and ice packs. Minimal to no relief. Patient would like to be evaluated today for possible back injury.      Reason for Disposition    [1] SEVERE back pain (e.g., excruciating, unable to do any normal activities) AND [2] not improved 2 hours after pain medicine    Answer Assessment - Initial Assessment Questions  1. ONSET: \"When did the pain begin?\"       Yesterday  2. LOCATION: \"Where does it hurt?\" (upper, mid or lower back)      Started in left hip, pain shoots down the left leg and into the low back  3. SEVERITY: \"How bad is the pain?\"  (e.g., Scale 1-10; mild, moderate, or severe)    - MILD (1-3): doesn't interfere with normal activities     - MODERATE (4-7): interferes with normal activities or awakens from sleep     - SEVERE (8-10): excruciating pain, unable to do any normal activities       7/10 - pinching feeding.   4. PATTERN: \"Is the pain constant?\" (e.g., yes, no; constant, intermittent)       Constant  5. RADIATION: \"Does the pain shoot into your legs or elsewhere?\"      Yes into the left leg and into the low back  6. CAUSE:  What do you think is causing the back pain?       Unknown  7. BACK " "OVERUSE:  \"Any recent lifting of heavy objects, strenuous work or exercise?      None  8. MEDICATIONS: \"What have you taken so far for the pain?\" (e.g., nothing, acetaminophen)      Ice pack and heating pads, no medications  9. NEUROLOGIC SYMPTOMS: Do you have any weakness, numbness, or problems with bowel/bladder control?\"      Increase in constipation  10. OTHER SYMPTOMS: \"Do you have any other symptoms?\" (e.g., fever, abdominal pain, burning with urination, blood in urine, fluid leaking from vagina)      Increase in vaginal discharge - no change in smell or color  11. CARMITA: \"What date are you expecting to deliver?\"      9/13/2017    Protocols used: ADULT PREGNANCY - BACK PAIN-A-    Britni Mallory RN............. 4/13/2017 8:36 AM         "

## 2018-01-04 NOTE — PROGRESS NOTES
Patient Information     Patient Name MRN Sex Priscilla Nichole [id number] Female 1990      Progress Notes by Elise Cross DC at 2017  4:00 PM     Author:  Elise Cross DC Service:  (none) Author Type:  INT THER- Other provider     Filed:  2017  4:43 PM Encounter Date:  2017 Status:  Signed     :  Elise Cross DC (INT THER- Other provider)            PATIENT:  Priscilla Garcia is a 26 y.o. female  at 19 weeks pregnant.  Referred by Dr. Dugan    PROBLEM:   Chief Complaint     Patient presents with       Chiropractic Care      F/U     2017 Visit #2    SUBJECTIVE:   Improved after initial visit. Is bending over better and able to play with 3yo and 5yo longer.    Standing washing dishes has put foot on shelf, which helps.   L sacroiliac pain and upper back/scapular muscle tension improved.  L lateral thigh painful just above knee today.  Most comfortable sleeping on R with left knee bent toward chest.    HPI:   Description, Duration and Location of Primary Problem: L sacroiliac pain   Mechanism of Injury:  started hurting 9 days ago.  cat tripped her and she fell back onto [facility] 2 weeks before.    Duration and Frequency of Pain: frequently   Radiation of pain: yes, into left lateral thigh to knee and up into left lumbar.    Pain rated 6/10 at it's worst   and 3/10 at it's best.  Pain course: unchanged  Worse with: stepping left leg over baby gate, standing washing dishes, playing with or lifting 2 kids.   Improved by: exercises-squats, bridges, quadruped hip extension  Previous injury:  After 2nd child was born her hip was out and saw a chiropractor to align it.  Had some similar sciatica pain at end of first pregnancy.    No bowel or bladder incontinence, saddle anesthesia or lower extremity weakness or numbness.      Other Secondary/Associated Problems  Description, Duration and Location of Problem: upper back/scapular muscle tension,  constant.    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  Pain course: unchanged  Worse with: lifting  Improved by: nothing tried    Subjective  4/20/2017 Neck index 12% Back index 44%      OBJECTIVE:  General Appearance: Pleasant, alert, appropriate appearance for age. No acute distress. Gravid.    Posture: Anterior head carriage,  thoracic kyphosis, and increased lumbar lordosis.    Low L iliac crest, protracted bilateral shoulder blades, level head.     Neurologic-  -SLR    Musculoskeletal:  Cervical Not assessed today.   Thoracic ROM: restricted extension    Lumbar ROM: restricted and low back pain on flexion, sacral dysfunction on extension    Gillets: +MANUEL, RADHA  L pinching/catching on weight bearing  ERWIN:  Negative bilateral    Tenderness over T2-3, T6-7 spinal levels and L SI joint(s). No sacral sciatic notch tenderness.   Muscle spasm: moderate midthoracic, lumbar paraspinals  Myofascial spasm and tenderness:  R round ligament  +Joint asymmetry and restriction: T2 L, T7 ext, MANUEL, RADHA    4/24/2017  General Appearance: Pleasant, alert, unkept hygiene and appearance. No acute distress. Gravid.  Tenderness over T2-3, T6-7 spinal levels and L SI joint(s).   Muscle spasm: moderate midthoracic, lumbar paraspinals  Myofascial spasm and tenderness:  R round ligament  +Leg length inequality: -[place] R; Restricted L heel to buttock   +Joint asymmetry and restriction: T2 L, T7 ext, MANUEL, RADHA    ASSESSMENT:      ICD-10-CM    1. Segmental and somatic dysfunction of sacral region M99.04 NH CHIRO SPINAL MANIP 3-4 REGIONS GICH ONLY   2. Sacroiliac sprain, initial encounter S33.6XXA NH CHIRO SPINAL MANIP 3-4 REGIONS GICH ONLY   3. Segmental and somatic dysfunction of pelvic region M99.05 NH CHIRO SPINAL MANIP 3-4 REGIONS GICH ONLY    4. Acute left-sided low back pain without sciatica M54.5 NH CHIRO SPINAL MANIP 3-4 REGIONS GICH ONLY   5. Segmental and somatic dysfunction of thoracic region M99.02 NH CHIRO SPINAL MANIP 3-4 REGIONS GICH ONLY   6. Acute bilateral thoracic back pain M54.6 NH CHIRO SPINAL MANIP 3-4 REGIONS [facility]ICH ONLY   7. Spasm of muscle M62.838 NH CHIRO SPINAL MANIP 3-4 REGIONS GICH ONLY       PLAN:   SMT to noted levels using manual Diversified and side posture lumbo pelvic adjustments.   Myofascial release: <1 min. R round ligament.   Response: improved pain     Plan of Care: SMT and adjunctive therapies/modalities at 1-2xweek for up to 4 weeks until symptoms resolved/decreased to reasonable level.  Re-eval in 1 month approx 5/20/2017 .    INSTRUCTIONS:   TSleep on side with pillow between knees and ankles.   Take breaks to get off feet, put foot on ledge when standing washing dishes.   Drink plenty of water.   Apply ice to area for 15-20 min. To reduce pain, swelling, and inflammation.  Repeat every 2 hours as needed.   Remain physically active within tolerance.    Return for follow-up within 2-4 days.

## 2018-01-04 NOTE — TELEPHONE ENCOUNTER
Patient Information     Patient Name MRN Sex Priscilla Nichole [id number] Female 1990      Telephone Encounter by Joshua Rasmussen LPN at 2017  8:52 AM     Author:  Joshua Rasmussen LPN Service:  (none) Author Type:  NURS- Licensed Practical Nurse     Filed:  2017  8:52 AM Encounter Date:  2017 Status:  Signed     :  Joshua Rasmussen LPN (NURS- Licensed Practical Nurse)            ----- Message from Gregory Wilkinson MD sent at 2017 10:26 AM CDT -----  Normal fetal survey and normal growth.  Please let her know.    Gregory Wilkinson MD ....................  2017   10:26 AM

## 2018-01-19 RX ORDER — ONDANSETRON 4 MG/1
4 TABLET, FILM COATED ORAL EVERY 8 HOURS PRN
COMMUNITY
Start: 2017-04-25 | End: 2021-06-11

## 2018-01-19 RX ORDER — POLYETHYLENE GLYCOL 3350 17 G/17G
17 POWDER, FOR SOLUTION ORAL DAILY PRN
COMMUNITY
Start: 2017-04-13 | End: 2021-06-11

## 2018-01-26 VITALS
DIASTOLIC BLOOD PRESSURE: 66 MMHG | HEART RATE: 96 BPM | RESPIRATION RATE: 20 BRPM | BODY MASS INDEX: 19.33 KG/M2 | TEMPERATURE: 98.8 F | WEIGHT: 113.25 LBS | DIASTOLIC BLOOD PRESSURE: 78 MMHG | BODY MASS INDEX: 20.79 KG/M2 | SYSTOLIC BLOOD PRESSURE: 102 MMHG | SYSTOLIC BLOOD PRESSURE: 110 MMHG | HEIGHT: 65 IN | HEIGHT: 64 IN | HEART RATE: 64 BPM | WEIGHT: 124.8 LBS

## 2018-01-26 VITALS
BODY MASS INDEX: 21.2 KG/M2 | SYSTOLIC BLOOD PRESSURE: 112 MMHG | TEMPERATURE: 97.7 F | WEIGHT: 121.6 LBS | DIASTOLIC BLOOD PRESSURE: 68 MMHG

## 2018-01-26 VITALS
SYSTOLIC BLOOD PRESSURE: 102 MMHG | HEART RATE: 60 BPM | SYSTOLIC BLOOD PRESSURE: 110 MMHG | BODY MASS INDEX: 19.33 KG/M2 | WEIGHT: 113.38 LBS | HEIGHT: 64 IN | HEIGHT: 64 IN | DIASTOLIC BLOOD PRESSURE: 78 MMHG | DIASTOLIC BLOOD PRESSURE: 64 MMHG | BODY MASS INDEX: 19.36 KG/M2 | HEART RATE: 64 BPM | WEIGHT: 113.25 LBS

## 2018-01-26 VITALS
DIASTOLIC BLOOD PRESSURE: 70 MMHG | HEART RATE: 88 BPM | BODY MASS INDEX: 20.85 KG/M2 | SYSTOLIC BLOOD PRESSURE: 104 MMHG | WEIGHT: 119.6 LBS

## 2018-01-28 ASSESSMENT — PATIENT HEALTH QUESTIONNAIRE - PHQ9: SUM OF ALL RESPONSES TO PHQ QUESTIONS 1-9: 1

## 2018-07-23 NOTE — PROGRESS NOTES
"Patient Information     Patient Name  Priscilla Garcia MRN  [id number] Sex  Female   1990      Letter by Zayra Dugan MD at      Author:  Zayra Dugan MD Service:  (none) Author Type:  (none)    Filed:   Encounter Date:  2017 Status:  (Other)           Priscilla Garcia   [address]  [address]  [facility] [address]          2017    Dear Ms. Garcia:    Welcome to [facility]!   Remember to activate your account quickly -  Your activation code expires in 45 days!    With [facility], you can view your health information, email your provider, schedule clinic appointments, get after visit instructions and more - online, anytime.     To activate your [facility] account, you will need:     to visit [email or web address]    your [facility] activation code: [email or web address]    Expires: 2017  1:35 PM     the last four digits of your Social Security number (SSN)     your date of birth.     Step-by-step instructions on how to set up your [facility] account are shown on the following page. If you requested access to your child/dependent s [facility] account or you have been granted access to another adult s [facility] account, instructions for viewing their information are also on the following page.     For questions or technical assistance, call [phone].    Thank you for choosing [facility] activation instructions     Activation code: [email or web address]  Expires: 2017  1:35 PM     Step 1: Go to [email or web address].     Step 2: Click on Enter your activation code.     Step 3: Type in your activation code (provided above), the last four digits of your Social Security number (SSN) and date of birth. This information is only required once. The next time you sign in to your account you will only need your username and password. If you receive an error that states \"Invalid Social Security number  or  Invalid date of birth\" that means the information we have on file " does not match the information entered. Please call [phone] for assistance.     Step 4: Choose a username that is easy for you to remember, but hard for others to guess. Your username must:     be between five and 24 characters     contain only letters and numbers (no symbols)   Once selected, your username cannot be changed.     Step 5: Choose a password. Your password must:     be at least eight characters     contain at least one uppercase letter     contain one lowercase letter     contain one number or symbol     be different than your username.     Step 6: Choose a security question. This will allow you to reset your password.     Step 7: Enter the answer to your security question. The answer cannot be the same as your password.     Step 8: Enter your email address. You will receive email notifications when new information is available in [facility]. You are now ready to start using [facility]!     If you requested proxy access for a child s or another adult s [facility] account, you will be able to access their health record by clicking on their name    Instructions for Child/Dependent Access  To view your child s record, click on your child's name under your name on the right hand side of the screen.   Instructions for Adult Proxy Access  To view your adult proxy record, click on the adult's name under your name on the right hand side of the screen.    In the event you have technical difficulties, please call   [employer] at [phone].

## 2021-05-17 ENCOUNTER — TELEPHONE (OUTPATIENT)
Dept: OBGYN | Facility: OTHER | Age: 31
End: 2021-05-17

## 2021-05-17 NOTE — TELEPHONE ENCOUNTER
Call back to patient, verification of name and . After discussion with nursing staff, advised patient to schedule intake visit to establish care. Wishes to establish with Dr. Mindi Linda, OB/GYN. Patient states no current issues with spotting/bleeding. History was Rhogam received at 1-2 months gestation with previous pregnancies. Advised patient to contact previous provider if desire to receive sooner. Education completed regarding Rhogam typically given 28 weeks gestation with patient negative blood type, unless concern arises with bleeding. Patient verbalized desire to continue plan of intake visit followed by prenatal care at [facility]. Advised to call back with any further questions or concerns. Patient verbalized understanding, forwarded to scheduling. Ann-Marie Kee RN ....................  2021   11:37 AM

## 2021-05-17 NOTE — TELEPHONE ENCOUNTER
Patient calling with concern of most recent positive pregnancy test 2021. Verification of name and . Patient states positive test, in need of Rhogam injection soon due to history of miscarriage. Not established patient at [facility], planning to establish care within OB/GYN portion of [facility]. Patient states has had successful pregnancies after Rhogam given early in pregnancy, has had several SABs. Discussion with patient regarding need for intake visit, process of New OB intake. Will call patient back with recommended timeline of Rhogam initiation and appointment scheduling.   Ann-Marie Kee RN ....................  2021   11:22 AM

## 2021-06-11 ENCOUNTER — VIRTUAL VISIT (OUTPATIENT)
Dept: OBGYN | Facility: OTHER | Age: 31
End: 2021-06-11
Attending: OBSTETRICS & GYNECOLOGY
Payer: COMMERCIAL

## 2021-06-11 VITALS — HEIGHT: 62 IN | BODY MASS INDEX: 20.24 KG/M2 | WEIGHT: 110 LBS

## 2021-06-11 DIAGNOSIS — Z32.01 PREGNANCY EXAMINATION OR TEST, POSITIVE RESULT: Primary | ICD-10-CM

## 2021-06-11 PROCEDURE — 99207 PR OB VISIT-NO CHARGE - GICH ONLY: CPT

## 2021-06-11 ASSESSMENT — MIFFLIN-ST. JEOR: SCORE: 1172.21

## 2021-06-11 ASSESSMENT — PATIENT HEALTH QUESTIONNAIRE - PHQ9: SUM OF ALL RESPONSES TO PHQ QUESTIONS 1-9: 2

## 2021-06-11 NOTE — PROGRESS NOTES
Verbal consent obtained for telephone visit. Total length of call: 32 min    HPI:    This is a 30 year old female patient,  who was called today for OB Intake visit. Patient reports positive pregnancy test at home.     Obstetrical history and OB Demographics updated to the best of this nurse's ability based on patient report. PHQ-9 depression screening and routine Domestic Abuse screening completed. All immediate questions and concerns answered.    Last menstrual period is reported as Patient's last menstrual period was 2021 (exact date). CARMITA based on LMP is 2022.  Her cycles are regular.  Her last menstrual period was about 10 days late.   Since her LMP, she has experienced  nausea, fatigue, headache and urinary frequency.       Personal OB history includes: age of first pregnancy 22, natural births 23, G  5 and P  3  Previous OB Provider: several  Previous Delivering Clinic: [place], [place] and [place], [place]  Release of Records: Will collect at first visit    Current delivery plan: GICH  Preferred OB Provider: any  Current Primary Care Provider: none  Pediatrician: none yet    Additional History: second child - placental abruption (term), third child - shoulder dystocia    Have you travelled during the pregnancy?No  Have your sexual partner(s) travelled during the pregnancy?No      HISTORY:   Planned Pregnancy: No, but welcome  Marital Status: In a relationship, Toby  Occupation: Stay-at-home mom  Living in Household: Significant Other and Children    Father of the baby is involved.   Family and father of baby is supportive of current pregnancy.  Past Medical History of Father of Baby:No significant medical history    Past History:  Her past medical history   Past Medical History:   Diagnosis Date     Anxiety disorder     No Comments Provided     Congenital cerebral cysts (H)     patient reported     Endometriosis     No Comments Provided   .      Her past surgical history:    Past Surgical History:   Procedure Laterality Date     LAPAROSCOPY DIAGNOSTIC (GYN)      ,pelvic endometriosis       She has a history of  hyperemesis gravidarum    Since her last LMP she admits to the use of tobacco and THC.    Pap smear history: Patient reports pap 2019, but note from 3/2020 indicates patient was to return in one month for pap. Most recent available was NIL in     STD/STI history: No STD history    STD/STI symptoms: no noticeable symptoms     Past medical, surgical, social and family history were reviewed and updated in EPIC.    Medications reviewed by this nurse. Current medication list:  Current Outpatient Medications   Medication Sig Dispense Refill     Prenatal Vit-Fe Sulfate-FA (PRENATAL MULTIVIT-IRON PO) Take  by mouth.       The following medications were recommended to be discontinued due to Pregnancy Category D status: none  Patient informed to contact her primary care provider as soon as possible to discuss a safer alternative.    Risk factors:  Moderate and moderately severe risks (consult with OB/Gyn)  Previous fetal or  demise: No  History of  delivery: No  History of heart disease Class I: No  Severe anemia, unresponsive to iron therapy: No  Pelvic mass or neoplasm: No  Previous : No  Hyper/hypothyroidism: No  History of postpartum hemorrhage requiring transfusion:No  History of Placenta Accreta: No    High Risk (Pregnancy managed by OB/Gyn)  Multiple pregnancy: No  Pre-gestational diabetes: No  Chronic Hypertension: No  Renal Failure: No  Heart disease, class II or greater: No  Rh Isoimmunization: No  Chronic active hepatitis: No  Convulsive disorder, poorly controlled: No  Isoimmune thrombocytopenia: No  Pre-term premature rupture of membranes: No  Lupus or other autoimmune disorder: No  Human Immunodeficiency Virus: No      ASSESSMENT/PLAN:       ICD-10-CM    1. Pregnancy examination or test, positive result  Z32.01 US OB < 14 Weeks Single        30 year old , 10w5d of pregnancy with CARMITA of 2022, by Last Menstrual Period    Per standing orders and scope of practice of this nurse, patient will have the following orders placed and completed prior to initial OB visit with the appropriate provider:    --early ultrasound for dating and viability ordered for 6+ weeks gestation based on LMP    --Quantitative Beta HCG and progesterone monitoring if indicated    Counseling given:     - Recommended weight gain for pregnancy: 25-35 lbs.   BMI < 18.5  28-40 lbs   18.5 - 24.9 25-35   25 - 29.9 15-25   > 30  < 15       PLAN/PATIENT INSTRUCTIONS:    Follow up in 1 weeks.  Normal exercise.  Normal sexual activity.  Prenatal vitamins.  Anticipated weight gain.    follow-up appointment with Dr. Mindi Linda for pre- care and take multivitamin or pre-kota vitamins    Coni Tran RN.................................................. 2021 3:41 PM

## 2021-07-01 ENCOUNTER — PRENATAL OFFICE VISIT (OUTPATIENT)
Dept: OBGYN | Facility: OTHER | Age: 31
End: 2021-07-01
Attending: STUDENT IN AN ORGANIZED HEALTH CARE EDUCATION/TRAINING PROGRAM
Payer: COMMERCIAL

## 2021-07-01 ENCOUNTER — HOSPITAL ENCOUNTER (OUTPATIENT)
Dept: ULTRASOUND IMAGING | Facility: OTHER | Age: 31
End: 2021-07-01
Attending: STUDENT IN AN ORGANIZED HEALTH CARE EDUCATION/TRAINING PROGRAM
Payer: COMMERCIAL

## 2021-07-01 VITALS
DIASTOLIC BLOOD PRESSURE: 68 MMHG | HEIGHT: 64 IN | HEART RATE: 198 BPM | SYSTOLIC BLOOD PRESSURE: 112 MMHG | WEIGHT: 117.21 LBS | BODY MASS INDEX: 20.01 KG/M2

## 2021-07-01 DIAGNOSIS — O98.311 CHLAMYDIA TRACHOMATIS INFECTION IN MOTHER DURING FIRST TRIMESTER OF PREGNANCY: Primary | ICD-10-CM

## 2021-07-01 DIAGNOSIS — Z32.01 PREGNANCY EXAMINATION OR TEST, POSITIVE RESULT: ICD-10-CM

## 2021-07-01 DIAGNOSIS — A56.8 CHLAMYDIA TRACHOMATIS INFECTION IN MOTHER DURING FIRST TRIMESTER OF PREGNANCY: Primary | ICD-10-CM

## 2021-07-01 DIAGNOSIS — Z34.81 ENCOUNTER FOR SUPERVISION OF OTHER NORMAL PREGNANCY IN FIRST TRIMESTER: ICD-10-CM

## 2021-07-01 DIAGNOSIS — O99.331 SMOKING (TOBACCO) COMPLICATING PREGNANCY, FIRST TRIMESTER: Primary | ICD-10-CM

## 2021-07-01 LAB
ABO + RH BLD: NORMAL
ABO + RH BLD: NORMAL
BLD GP AB SCN SERPL QL: NORMAL
BLOOD BANK CMNT PATIENT-IMP: NORMAL
C TRACH DNA SPEC QL NAA+PROBE: ABNORMAL
ERYTHROCYTE [DISTWIDTH] IN BLOOD BY AUTOMATED COUNT: 12.6 % (ref 10–15)
HCT VFR BLD AUTO: 42.2 % (ref 35–47)
HGB BLD-MCNC: 14.9 G/DL (ref 11.7–15.7)
MCH RBC QN AUTO: 30.8 PG (ref 26.5–33)
MCHC RBC AUTO-ENTMCNC: 35.3 G/DL (ref 31.5–36.5)
MCV RBC AUTO: 87 FL (ref 78–100)
N GONORRHOEA DNA SPEC QL NAA+PROBE: NOT DETECTED
PLATELET # BLD AUTO: 202 10E9/L (ref 150–450)
RBC # BLD AUTO: 4.83 10E12/L (ref 3.8–5.2)
SPECIMEN EXP DATE BLD: NORMAL
SPECIMEN SOURCE: ABNORMAL
WBC # BLD AUTO: 9.3 10E9/L (ref 4–11)

## 2021-07-01 PROCEDURE — 36415 COLL VENOUS BLD VENIPUNCTURE: CPT | Mod: ZL | Performed by: STUDENT IN AN ORGANIZED HEALTH CARE EDUCATION/TRAINING PROGRAM

## 2021-07-01 PROCEDURE — 87591 N.GONORRHOEAE DNA AMP PROB: CPT | Mod: ZL | Performed by: STUDENT IN AN ORGANIZED HEALTH CARE EDUCATION/TRAINING PROGRAM

## 2021-07-01 PROCEDURE — 85027 COMPLETE CBC AUTOMATED: CPT | Mod: ZL | Performed by: STUDENT IN AN ORGANIZED HEALTH CARE EDUCATION/TRAINING PROGRAM

## 2021-07-01 PROCEDURE — 86901 BLOOD TYPING SEROLOGIC RH(D): CPT | Mod: ZL | Performed by: STUDENT IN AN ORGANIZED HEALTH CARE EDUCATION/TRAINING PROGRAM

## 2021-07-01 PROCEDURE — G0123 SCREEN CERV/VAG THIN LAYER: HCPCS | Performed by: STUDENT IN AN ORGANIZED HEALTH CARE EDUCATION/TRAINING PROGRAM

## 2021-07-01 PROCEDURE — 76801 OB US < 14 WKS SINGLE FETUS: CPT

## 2021-07-01 PROCEDURE — 87624 HPV HI-RISK TYP POOLED RSLT: CPT | Mod: ZL | Performed by: STUDENT IN AN ORGANIZED HEALTH CARE EDUCATION/TRAINING PROGRAM

## 2021-07-01 PROCEDURE — 86803 HEPATITIS C AB TEST: CPT | Mod: ZL | Performed by: STUDENT IN AN ORGANIZED HEALTH CARE EDUCATION/TRAINING PROGRAM

## 2021-07-01 PROCEDURE — 86762 RUBELLA ANTIBODY: CPT | Mod: ZL | Performed by: STUDENT IN AN ORGANIZED HEALTH CARE EDUCATION/TRAINING PROGRAM

## 2021-07-01 PROCEDURE — 87340 HEPATITIS B SURFACE AG IA: CPT | Mod: ZL | Performed by: STUDENT IN AN ORGANIZED HEALTH CARE EDUCATION/TRAINING PROGRAM

## 2021-07-01 PROCEDURE — 86850 RBC ANTIBODY SCREEN: CPT | Mod: ZL | Performed by: STUDENT IN AN ORGANIZED HEALTH CARE EDUCATION/TRAINING PROGRAM

## 2021-07-01 PROCEDURE — 88142 CYTOPATH C/V THIN LAYER: CPT | Performed by: STUDENT IN AN ORGANIZED HEALTH CARE EDUCATION/TRAINING PROGRAM

## 2021-07-01 PROCEDURE — 86900 BLOOD TYPING SEROLOGIC ABO: CPT | Mod: ZL | Performed by: STUDENT IN AN ORGANIZED HEALTH CARE EDUCATION/TRAINING PROGRAM

## 2021-07-01 PROCEDURE — 99207 PR OB VISIT-NO CHARGE - GICH ONLY: CPT | Performed by: STUDENT IN AN ORGANIZED HEALTH CARE EDUCATION/TRAINING PROGRAM

## 2021-07-01 PROCEDURE — 87491 CHLMYD TRACH DNA AMP PROBE: CPT | Mod: ZL | Performed by: STUDENT IN AN ORGANIZED HEALTH CARE EDUCATION/TRAINING PROGRAM

## 2021-07-01 PROCEDURE — 86780 TREPONEMA PALLIDUM: CPT | Mod: ZL | Performed by: STUDENT IN AN ORGANIZED HEALTH CARE EDUCATION/TRAINING PROGRAM

## 2021-07-01 PROCEDURE — 87086 URINE CULTURE/COLONY COUNT: CPT | Mod: ZL | Performed by: STUDENT IN AN ORGANIZED HEALTH CARE EDUCATION/TRAINING PROGRAM

## 2021-07-01 PROCEDURE — 87389 HIV-1 AG W/HIV-1&-2 AB AG IA: CPT | Mod: ZL | Performed by: STUDENT IN AN ORGANIZED HEALTH CARE EDUCATION/TRAINING PROGRAM

## 2021-07-01 RX ORDER — AZITHROMYCIN 500 MG/1
1000 TABLET, FILM COATED ORAL ONCE
Qty: 2 TABLET | Refills: 0 | Status: SHIPPED | OUTPATIENT
Start: 2021-07-01 | End: 2021-07-01

## 2021-07-01 ASSESSMENT — MIFFLIN-ST. JEOR: SCORE: 1232.69

## 2021-07-01 ASSESSMENT — PAIN SCALES - GENERAL: PAINLEVEL: NO PAIN (0)

## 2021-07-01 NOTE — PROGRESS NOTES
New OB Visit    Chief Complaint: Establish care for a new pregnancy    History of Present Illness:  Ms. Priscilla Garcia is a 30 year old yo  here today for a new OB visit. She reports her LMP as Patient's last menstrual period was 2021 (exact date). She is unsure of this date. She had a positive home urine pregnancy test on 2021. She reports early pregnancy symptoms including nausea &/or vomiting and fatigue. She is taking prenatal vitamins at this time. This pregnancy was planned. The patient reports that she is excited about the pregnancy. Father of the baby, Toby, is involved.  We discussed some warning signs to be alert for that could suggest spontaneous miscarriage including vaginal bleeding, cramping as well as what symptoms should prompt medical evaluation in clinic or the ER.     We also discussed genetic screening including [place] non-invasive testing, carrier screening for SMA and CF and the [place] screen. All of these tests were offered to the family and they have declined at this time.     Medical History:  Past Medical History:   Diagnosis Date     Anxiety disorder     No Comments Provided     Congenital cerebral cysts (H)     patient reported     Endometriosis     No Comments Provided     She denies any chronic medical conditions: specifically denies asthma, HTN, DM    Obstetric History:   :   G2:   G3: SAB  G4:   G5: current  Pelvis proven to a little over 7 lbs, history of all being in the 40 weeks before labor onset    GYN History:  No history of STIs  Last pap smear:  NIL  No history of abnormal pap smears    Past Surgical History:  Past Surgical History:   Procedure Laterality Date     LAPAROSCOPY DIAGNOSTIC (GYN)      ,pelvic endometriosis       Family Medical History:  History reviewed. No pertinent family history.  Specifically denies breast, ovarian, endometrial and colon cancers in her family  She also is not aware of any familial thrombophilias  and coagulopathies in her family    Medications:  Current Outpatient Medications   Medication     Prenatal Vit-Fe Sulfate-FA (PRENATAL MULTIVIT-IRON PO)     No current facility-administered medications for this visit.      Allergies:     Allergies   Allergen Reactions     Latex Other (See Comments) and Rash     Local reaction of irritation/numbness     Coconut [Coconut Oil] Hives and Difficulty breathing     Aspirin Hives and Other (See Comments)     Other reaction(s): Bleeding  Family disorder of bleeding so she does not use     Coconut Oil Rash     Other reaction(s): Angioedema     Social History:  Social History     Tobacco Use     Smoking status: Light Tobacco Smoker     Packs/day: 0.25     Years: 1.50     Pack years: 0.37     Types: Cigarettes     Smokeless tobacco: Never Used     Tobacco comment: Quit smoking: cutting  back   Substance Use Topics     Alcohol use: No     Alcohol/week: 0.0 standard drinks     Comment: Alcoholic Drinks/day: occasionally     Drug use: Yes     Types: Marijuana     Comment: several times per week     She lives at home with her PRITI, Toby, and three children (ages 8,6,4)  Cigarettes: 3-4 each day  Marijuana use several times each week    ROS:   Skin: negative for rash, bruising  Eyes: negative for visual blurring, double vision  Ears/Nose/Throat: negative for nasal congestion, vertigo  Respiratory: No shortness of breath, dyspnea on exertion, cough, or hemoptysis  Cardiovascular: negative for palpitations, chest pain, lower extremity edema and syncope or near-syncope  Gastrointestinal: negative for, nausea, vomiting and hematemesis  Genitourinary: negative for, dysuria, frequency and urgency  Musculoskeletal: negative for, back pain and muscular weakness  Neurologic: negative for, headaches, syncope, seizures and local weakness  Psychiatric: negative for, anxiety, depression and hallucinations  Hematologic/Lymphatic/Immunologic: negative for, anemia, chills and fever      Exam:  BP  "112/68   Pulse 198   Ht 1.619 m (5' 3.75\")   Wt 53.2 kg (117 lb 3.4 oz)   LMP 2021 (Exact Date)   Breastfeeding No   BMI 20.28 kg/m    General: No acute distress, well-appearing  Neck: Normal thyroid gland on palpation without nodules, enlargement or pain  Breast: Normal appearing skin on breasts bilaterally, No nodules or masses palpated, no nipple discharge or bleeding  Cardiac: Normal rate, regular rhythm, no murmurs, gallops or rubs, normal perfusion to extremities  Lungs: Clear on auscultation, no wheezing, non-labored respirations  Abdomen: Soft, non-tender, no masses  Pelvic exam: Normal appearing external genitalia, normal appearing vaginal walls with pink ruggae. No noted vaginal discharge or lesions. Cervix is closed without masses, nodules, erythema or discharge at the os.     Labs:  HCG Qual Urine   Date Value Ref Range Status   2016 Positive (Pos) Negative      Comment:     Is Rh typing necessary?   ]    Dating ultrasound: Done    CARMITA based on LMP 2022- CARMITA based on US 2022. Per ACOG recommendations these dates are greater than 7 days different and thus should be changed based off US. Final CARMITA will be 2022 based on 11w3d US.    Assessment:  Ms. Priscilla Garcia is a 30 year old yo  here today to establish care for this pregnancy. Her pregnancy is complicated by maternal smoking and marijuana use.    Plan:  # Routine Prenatal Care  -- Datinw3d US CARMITA: 2022  -- PNLs: collected today including the following   CBC   RPR   Hep B S Ag   Hep C    Rubella   HIV   ABO/Rh type   Antibody Screen    -- Genetic Screening: Discussed with patient, they have politely declined  -- Anatomy US: Planned for approximately 20 weeks gestation. Currently no indication for Level II  -- Immunizations: Plans Tdap at approximately 27 weeks gestation  -- 3rd TM labs including CBC, RPR: Planned for after 27 weeks gestation  -- 1 hr GTT: Planned for  24-28 weeks   -- GBS: Planned for 36 " weeks  -- Postpartum Planning: To be discussed   -- Delivery Planning: No indication for early IOL at this time. To be discussed continually  -- Return to clinic in 4 weeks for OB follow up visit  -- Planning to do at next visit: schedule anatomy US    # Maternal smoking  -- Currently smokes 3-4 cigarettes daily  -- Smoking cessation discussed and recommended : noted increased risk IUGR, oligohydramnios, preE    # Maternal marijuana use  -- Notes she uses for anxiety  -- Discussed alternatives for anxiety and increased risk of placental complications similiarly to smoking in pregnancy  -- Offered to potentially start selective serotonin reuptake inhibitor if she feels her anxiety is unable to be controlled with lifestyle measures, she politely declines    VITO BANKS MD on 7/1/2021 at 12:01 PM

## 2021-07-01 NOTE — NURSING NOTE
Pt presents to clinic today for Ob physical.      Medication Reconciliation: complete  Paula James LPN

## 2021-07-02 LAB
HBV SURFACE AG SERPL QL IA: NONREACTIVE
HCV AB SERPL QL IA: NONREACTIVE
HIV 1+2 AB+HIV1 P24 AG SERPL QL IA: NONREACTIVE
RUBV IGG SERPL IA-ACNC: 43 IU/ML
T PALLIDUM AB SER QL: NONREACTIVE

## 2021-07-03 LAB
BACTERIA SPEC CULT: NORMAL
SPECIMEN SOURCE: NORMAL

## 2021-07-07 LAB
COPATH REPORT: NORMAL
PAP: NORMAL

## 2021-07-12 LAB
FINAL DIAGNOSIS: NORMAL
HPV HR 12 DNA CVX QL NAA+PROBE: NEGATIVE
HPV16 DNA SPEC QL NAA+PROBE: NEGATIVE
HPV18 DNA SPEC QL NAA+PROBE: NEGATIVE
SPECIMEN DESCRIPTION: NORMAL
SPECIMEN SOURCE CVX/VAG CYTO: NORMAL

## 2021-07-24 NOTE — PROGRESS NOTES
Return OB Visit    S: Ms. Garcia is feeling well today. She has no acute concerns. Denies leaking of fluid, vaginal bleeding, painful contractions.    O:   /68   Pulse 86   Wt 56 kg (123 lb 8 oz)   LMP 2021 (Exact Date)   BMI 21.37 kg/m      Gen: Well-appearing, NAD  See OB Flowsheet     bpm    Assessment:  Ms. Priscilla Garcia is a 30 year old yo  here for OB follow up. She is currently 15w1d. Her pregnancy is complicated by maternal smoking and marijuana use, chlamydia in first trimester.     Plan:  # Routine Prenatal Care  -- Datinw3d US CARMITA: 2022  -- PNLs:               O Neg, Ab screen neg              RPR nr              Hep B S Ag neg              Hep C neg              Rubella immune              HIV neg             +Chlamydia   - Gonorrhea   Pap: NIL, HPV negative     -- Genetic Screening: Discussed with patient, politely declined  -- Anatomy US: Planned for approximately 20 weeks gestation. Currently no indication for Level II  -- Immunizations: Plans Tdap at approximately 27 weeks gestation, Rhogam at 28 weeks  -- 3rd TM labs including CBC, RPR: Planned for after 27 weeks gestation  -- 1 hr GTT: Planned for  24-28 weeks   -- GBS: Planned for 36 weeks  -- Postpartum Planning: To be discussed   -- Delivery Planning: No indication for early IOL at this time. To be discussed continually  -- Return to clinic in 4 weeks for OB follow up visit  -- Planning to do at next visit: 1 hr GTT if she is >24 weeks     # Chlamydia in first trimester  -- s/p treatment  -- Plan for test of cure today   -- Plan for test of reinfection at approx 32 weeks    # O negative blood type  -- Candidate for rhogam at 28 weeks    # Maternal smoking  -- Currently smokes 3-4 cigarettes daily  -- Smoking cessation discussed and recommended : noted increased risk IUGR, oligohydramnios, preE     # Maternal marijuana use  -- Notes she uses for anxiety  -- Discussed alternatives for anxiety and increased  risk of placental complications similiarly to smoking in pregnancy  -- Offered to potentially start selective serotonin reuptake inhibitor if she feels her anxiety is unable to be controlled with lifestyle measures, she politely declines

## 2021-07-27 ENCOUNTER — PRENATAL OFFICE VISIT (OUTPATIENT)
Dept: OBGYN | Facility: OTHER | Age: 31
End: 2021-07-27
Attending: STUDENT IN AN ORGANIZED HEALTH CARE EDUCATION/TRAINING PROGRAM
Payer: COMMERCIAL

## 2021-07-27 VITALS
BODY MASS INDEX: 21.37 KG/M2 | DIASTOLIC BLOOD PRESSURE: 68 MMHG | HEART RATE: 86 BPM | WEIGHT: 123.5 LBS | SYSTOLIC BLOOD PRESSURE: 108 MMHG

## 2021-07-27 DIAGNOSIS — O98.311 CHLAMYDIA TRACHOMATIS INFECTION IN MOTHER DURING FIRST TRIMESTER OF PREGNANCY: Primary | ICD-10-CM

## 2021-07-27 DIAGNOSIS — O99.332 MATERNAL TOBACCO USE IN SECOND TRIMESTER: ICD-10-CM

## 2021-07-27 DIAGNOSIS — O26.899 RH NEGATIVE STATE IN ANTEPARTUM PERIOD: ICD-10-CM

## 2021-07-27 DIAGNOSIS — Z67.91 RH NEGATIVE STATE IN ANTEPARTUM PERIOD: ICD-10-CM

## 2021-07-27 DIAGNOSIS — A56.8 CHLAMYDIA TRACHOMATIS INFECTION IN MOTHER DURING FIRST TRIMESTER OF PREGNANCY: Primary | ICD-10-CM

## 2021-07-27 LAB
C TRACH DNA SPEC QL PROBE+SIG AMP: NEGATIVE
N GONORRHOEA DNA SPEC QL NAA+PROBE: NEGATIVE

## 2021-07-27 PROCEDURE — 87491 CHLMYD TRACH DNA AMP PROBE: CPT | Mod: ZL | Performed by: STUDENT IN AN ORGANIZED HEALTH CARE EDUCATION/TRAINING PROGRAM

## 2021-07-27 PROCEDURE — 99207 PR OB VISIT-NO CHARGE - GICH ONLY: CPT | Performed by: STUDENT IN AN ORGANIZED HEALTH CARE EDUCATION/TRAINING PROGRAM

## 2021-07-27 NOTE — NURSING NOTE
Pt presents to clinic today for prenatal care 15w1d. Pt denies any contractions, bleeding, or leakage of fluid at this time.      Medication Reconciliation: complete  Paula James LPN

## 2021-08-17 ENCOUNTER — HOSPITAL ENCOUNTER (EMERGENCY)
Facility: OTHER | Age: 31
Discharge: HOME | End: 2021-08-17
Attending: STUDENT IN AN ORGANIZED HEALTH CARE EDUCATION/TRAINING PROGRAM | Admitting: STUDENT IN AN ORGANIZED HEALTH CARE EDUCATION/TRAINING PROGRAM
Payer: MEDICAID

## 2021-08-17 VITALS
TEMPERATURE: 97.6 F | DIASTOLIC BLOOD PRESSURE: 76 MMHG | SYSTOLIC BLOOD PRESSURE: 108 MMHG | OXYGEN SATURATION: 99 % | WEIGHT: 123 LBS | HEART RATE: 97 BPM | RESPIRATION RATE: 16 BRPM | BODY MASS INDEX: 21.28 KG/M2

## 2021-08-17 DIAGNOSIS — M54.6 ACUTE MIDLINE THORACIC BACK PAIN: ICD-10-CM

## 2021-08-17 LAB
BASOPHILS # BLD AUTO: 0.1 10E3/UL (ref 0–0.2)
BASOPHILS NFR BLD AUTO: 1 %
CRP SERPL-MCNC: 3 MG/L
EOSINOPHIL # BLD AUTO: 0.2 10E3/UL (ref 0–0.7)
EOSINOPHIL NFR BLD AUTO: 2 %
ERYTHROCYTE [DISTWIDTH] IN BLOOD BY AUTOMATED COUNT: 13.1 % (ref 10–15)
HCT VFR BLD AUTO: 38.1 % (ref 35–47)
HGB BLD-MCNC: 13.3 G/DL (ref 11.7–15.7)
IMM GRANULOCYTES # BLD: 0.3 10E3/UL
IMM GRANULOCYTES NFR BLD: 3 %
LYMPHOCYTES # BLD AUTO: 1.1 10E3/UL (ref 0.8–5.3)
LYMPHOCYTES NFR BLD AUTO: 11 %
MCH RBC QN AUTO: 31.1 PG (ref 26.5–33)
MCHC RBC AUTO-ENTMCNC: 34.9 G/DL (ref 31.5–36.5)
MCV RBC AUTO: 89 FL (ref 78–100)
MONOCYTES # BLD AUTO: 0.7 10E3/UL (ref 0–1.3)
MONOCYTES NFR BLD AUTO: 7 %
NEUTROPHILS # BLD AUTO: 7.8 10E3/UL (ref 1.6–8.3)
NEUTROPHILS NFR BLD AUTO: 76 %
NRBC # BLD AUTO: 0 10E3/UL
NRBC BLD AUTO-RTO: 0 /100
PLATELET # BLD AUTO: 180 10E3/UL (ref 150–450)
RBC # BLD AUTO: 4.27 10E6/UL (ref 3.8–5.2)
WBC # BLD AUTO: 10.2 10E3/UL (ref 4–11)

## 2021-08-17 PROCEDURE — 250N000013 HC RX MED GY IP 250 OP 250 PS 637: Performed by: STUDENT IN AN ORGANIZED HEALTH CARE EDUCATION/TRAINING PROGRAM

## 2021-08-17 PROCEDURE — 86140 C-REACTIVE PROTEIN: CPT | Performed by: STUDENT IN AN ORGANIZED HEALTH CARE EDUCATION/TRAINING PROGRAM

## 2021-08-17 PROCEDURE — 99283 EMERGENCY DEPT VISIT LOW MDM: CPT | Performed by: STUDENT IN AN ORGANIZED HEALTH CARE EDUCATION/TRAINING PROGRAM

## 2021-08-17 PROCEDURE — 36415 COLL VENOUS BLD VENIPUNCTURE: CPT | Performed by: STUDENT IN AN ORGANIZED HEALTH CARE EDUCATION/TRAINING PROGRAM

## 2021-08-17 PROCEDURE — 85025 COMPLETE CBC W/AUTO DIFF WBC: CPT | Performed by: STUDENT IN AN ORGANIZED HEALTH CARE EDUCATION/TRAINING PROGRAM

## 2021-08-17 PROCEDURE — 250N000009 HC RX 250: Performed by: STUDENT IN AN ORGANIZED HEALTH CARE EDUCATION/TRAINING PROGRAM

## 2021-08-17 RX ORDER — OXYCODONE HYDROCHLORIDE 5 MG/1
5 TABLET ORAL ONCE
Status: COMPLETED | OUTPATIENT
Start: 2021-08-17 | End: 2021-08-17

## 2021-08-17 RX ORDER — CYCLOBENZAPRINE HCL 10 MG
10 TABLET ORAL ONCE
Status: COMPLETED | OUTPATIENT
Start: 2021-08-17 | End: 2021-08-17

## 2021-08-17 RX ORDER — LIDOCAINE 50 MG/G
OINTMENT TOPICAL ONCE
Status: DISCONTINUED | OUTPATIENT
Start: 2021-08-17 | End: 2021-08-17 | Stop reason: CLARIF

## 2021-08-17 RX ORDER — CYCLOBENZAPRINE HCL 10 MG
10 TABLET ORAL 3 TIMES DAILY PRN
Qty: 10 TABLET | Refills: 0 | Status: SHIPPED | OUTPATIENT
Start: 2021-08-17 | End: 2021-08-25

## 2021-08-17 RX ORDER — LIDOCAINE 40 MG/G
CREAM TOPICAL ONCE
Status: COMPLETED | OUTPATIENT
Start: 2021-08-17 | End: 2021-08-17

## 2021-08-17 RX ADMIN — CYCLOBENZAPRINE 10 MG: 10 TABLET, FILM COATED ORAL at 08:16

## 2021-08-17 RX ADMIN — OXYCODONE HYDROCHLORIDE 5 MG: 5 TABLET ORAL at 08:21

## 2021-08-17 RX ADMIN — LIDOCAINE: 40 CREAM TOPICAL at 08:21

## 2021-08-17 NOTE — ED TRIAGE NOTES
Pt here with family with c/o rt sided neck pain that radiates down into her back, pt reports that she has had pain x 2 days getting progressively worse, pain worse with activity, pt is 18 weeks pregnant, VSS, pt brought back into ER to be evaluated  ED Nursing Triage Note (General)   ________________________________    Priscilla TIESHA Garcia is a 31 year old Female that presents to triage private car  With history of  Neck pain reported by patient   Significant symptoms had onset 2 day(s) ago.  /76   Pulse 97   Temp 97.6  F (36.4  C) (Tympanic)   Resp 16   Wt 55.8 kg (123 lb)   LMP 03/28/2021 (Exact Date)   SpO2 99%   BMI 21.28 kg/m  t  Patient appears alert  and oriented, in mild distress., and cooperative and pleasant behavior.    GCS Total = 15  Airway: intact  Breathing noted as Normal  Circulation Normal  Skin:  Normal  Action taken:  Triage to critical care immediately      PRE HOSPITAL PRIOR LIVING SITUATION Alone

## 2021-08-17 NOTE — ED PROVIDER NOTES
History     Chief Complaint   Patient presents with     Neck Pain       Priscilla Garcia is a 31 year old  female at 18 weeks who presents with upper thoracic back pain.  Started 2 days ago upon awakening and has been progressively worsening each morning.  It has been constant in nature.  Is described as sharp and can be stabbing with any rotation of her neck.  Is located over her upper thoracic midline with radiation into her right scapula.  Denies preceding trauma.  Denies fever, chills, dyspnea, chest pain, nausea/vomiting.  With severe flares of the pain she does have some mild nausea and splinting of her breathing.  She has tried ibuprofen and ice without improvement.  Denies any back pain history.    Allergies   Allergen Reactions     Latex Other (See Comments) and Rash     Local reaction of irritation/numbness     Coconut [Coconut Oil] Hives and Difficulty breathing     Aspirin Hives and Other (See Comments)     Other reaction(s): Bleeding  Family disorder of bleeding so she does not use     Coconut Oil Rash     Other reaction(s): Angioedema       Patient Active Problem List    Diagnosis Date Noted     Marijuana use 2015     Priority: Medium     Rh negative state in antepartum period 2015     Priority: Medium     SGA (small for gestational age) 2015     Priority: Medium     Nausea 2015     Priority: Medium     Supervision of normal first pregnancy 10/17/2012     Priority: Medium     Rh negative status during pregnancy 2012     Priority: Medium     Need for Tdap vaccination 2012     Priority: Medium     Tdap after 20 weeks gestation       Colloid cyst of third ventricle (H) 2011     Priority: Medium     There is a small, 7 mm area of high signal intensity seen on sagittal midline images in the anterior aspect of the third ventricle.A followup study in 6-9 months could be performed to confirm that this finding remains stable         Disorder of ovary 2009      Priority: Medium     2009 January         Past Medical History:   Diagnosis Date     Anxiety disorder      Congenital cerebral cysts (H)      Endometriosis        Past Surgical History:   Procedure Laterality Date     LAPAROSCOPY DIAGNOSTIC (GYN)      2009,pelvic endometriosis       No family history on file.    Social History     Tobacco Use     Smoking status: Light Tobacco Smoker     Packs/day: 0.25     Years: 1.50     Pack years: 0.37     Types: Cigarettes     Smokeless tobacco: Never Used     Tobacco comment: Quit smoking: cutting  back   Vaping Use     Vaping Use: Never used   Substance Use Topics     Alcohol use: No     Alcohol/week: 0.0 standard drinks     Comment: Alcoholic Drinks/day: occasionally     Drug use: Yes     Types: Marijuana     Comment: several times per week       Medications:    cyclobenzaprine (FLEXERIL) 10 MG tablet  Prenatal Vit-Fe Sulfate-FA (PRENATAL MULTIVIT-IRON PO)        Review of Systems: See HPI for pertinent negatives and positives. All other systems reviewed and found to be negative.    Physical Exam   /76   Pulse 97   Temp 97.6  F (36.4  C) (Tympanic)   Resp 16   Wt 55.8 kg (123 lb)   LMP 03/28/2021 (Exact Date)   SpO2 99%   BMI 21.28 kg/m       General: awake, uncomfortable  HEENT: atraumatic, neck supple  Respiratory: normal effort, clear to auscultation bilaterally  Cardiovascular: regular rate and rhythm, no murmurs  MSK: no deformities or edema of back or shoulders or neck, upper thoracic tenderness, difficult to rotate neck and with flexion/extension due to pain, FROM of shoulders  Skin: warm, dry, no rashes or bruising or erythema  Neuro: alert, no focal deficits, upper extremity sensations intact  Psych: appropriate mood and affect    ED Course      ED Course as of Aug 17 0925   Tue Aug 17, 2021   0920 Feeling significant improvement now able to rotate neck and flexion/extension of neck improving.          Results for orders placed or performed during the  hospital encounter of 08/17/21 (from the past 24 hour(s))   CBC with platelets differential    Narrative    The following orders were created for panel order CBC with platelets differential.  Procedure                               Abnormality         Status                     ---------                               -----------         ------                     CBC with platelets and d...[[id number]]  Abnormal            Final result                 Please view results for these tests on the individual orders.   CRP inflammation   Result Value Ref Range    CRP Inflammation 3.0 <10.0 mg/L   CBC with platelets and differential   Result Value Ref Range    WBC Count 10.2 4.0 - 11.0 10e3/uL    RBC Count 4.27 3.80 - 5.20 10e6/uL    Hemoglobin 13.3 11.7 - 15.7 g/dL    Hematocrit 38.1 35.0 - 47.0 %    MCV 89 78 - 100 fL    MCH 31.1 26.5 - 33.0 pg    MCHC 34.9 31.5 - 36.5 g/dL    RDW 13.1 10.0 - 15.0 %    Platelet Count 180 150 - 450 10e3/uL    % Neutrophils 76 %    % Lymphocytes 11 %    % Monocytes 7 %    % Eosinophils 2 %    % Basophils 1 %    % Immature Granulocytes 3 %    NRBCs per 100 WBC 0 <1 /100    Absolute Neutrophils 7.8 1.6 - 8.3 10e3/uL    Absolute Lymphocytes 1.1 0.8 - 5.3 10e3/uL    Absolute Monocytes 0.7 0.0 - 1.3 10e3/uL    Absolute Eosinophils 0.2 0.0 - 0.7 10e3/uL    Absolute Basophils 0.1 0.0 - 0.2 10e3/uL    Absolute Immature Granulocytes 0.3 (H) <=0.0 10e3/uL    Absolute NRBCs 0.0 10e3/uL       Medications   cyclobenzaprine (FLEXERIL) tablet 10 mg (10 mg Oral Given 8/17/21 0816)   oxyCODONE (ROXICODONE) tablet 5 mg (5 mg Oral Given 8/17/21 0821)   lidocaine (LMX4) cream ( Topical Given 8/17/21 0821)       Assessments & Plan (with Medical Decision Making)     I have reviewed the nursing notes.    31 year old female evaluated for upper thoracic midline pain with onset after awakening from sleep.  Suspect this is most likely a musculoskeletal/impinged nerve.  CBC and CRP collected to assess for possible  infectious etiology, and were unremarkable.  Discussed possible thoracic x-ray with patient but felt this would be low yield and preferred to avoid unnecessary radiation with her being pregnant.  Significant provement with above medications and heating pack.  Patient feels appropriate for discharge home.  Short course of cyclobenzaprine which is category B medication was prescribed. ED return precautions given. Discharged home in stable condition.    I have reviewed the findings, diagnosis, plan, and need for any follow up with the patient.      New Prescriptions    CYCLOBENZAPRINE (FLEXERIL) 10 MG TABLET    Take 1 tablet (10 mg) by mouth 3 times daily as needed for muscle spasms       Final diagnoses:   Acute midline thoracic back pain       8/17/2021   [facility] AND [facility]     Roosevelt Thurston MD  08/17/21 9007

## 2021-08-17 NOTE — DISCHARGE INSTRUCTIONS
Please review attached instructions including reasons to return to the emergency department. Use Flexeril as needed over next couple days. Recommend using heat regularly over your upper back over next couple days as well.

## 2021-08-25 ENCOUNTER — PRENATAL OFFICE VISIT (OUTPATIENT)
Dept: OBGYN | Facility: OTHER | Age: 31
End: 2021-08-25
Attending: STUDENT IN AN ORGANIZED HEALTH CARE EDUCATION/TRAINING PROGRAM
Payer: MEDICAID

## 2021-08-25 VITALS
WEIGHT: 128.2 LBS | SYSTOLIC BLOOD PRESSURE: 122 MMHG | BODY MASS INDEX: 22.18 KG/M2 | HEART RATE: 116 BPM | DIASTOLIC BLOOD PRESSURE: 80 MMHG

## 2021-08-25 DIAGNOSIS — O99.332 MATERNAL TOBACCO USE IN SECOND TRIMESTER: Primary | ICD-10-CM

## 2021-08-25 DIAGNOSIS — Z67.91 RH NEGATIVE STATE IN ANTEPARTUM PERIOD: ICD-10-CM

## 2021-08-25 DIAGNOSIS — O26.899 RH NEGATIVE STATE IN ANTEPARTUM PERIOD: ICD-10-CM

## 2021-08-25 PROCEDURE — 99207 PR OB VISIT-NO CHARGE - GICH ONLY: CPT | Performed by: STUDENT IN AN ORGANIZED HEALTH CARE EDUCATION/TRAINING PROGRAM

## 2021-08-25 NOTE — NURSING NOTE
Pt presents to clinic today for prenatal care 19w2d. Pt denies any contractions, bleeding, or leakage of fluid at this time. States baby is moving good.      Medication Reconciliation: complete  Paula James LPN

## 2021-08-25 NOTE — PROGRESS NOTES
Return OB Visit    S: Ms. Garcia is feeling well today. She has no acute concerns. Denies leaking of fluid, vaginal bleeding, painful contractions.    O:   /80   Pulse 116   Wt 58.2 kg (128 lb 3.2 oz)   LMP 2021 (Exact Date)   BMI 22.18 kg/m      Gen: Well-appearing, NAD  Anatomy US this week  FHT: 147 bpm    Assessment:  Ms. Priscilla Garcia is a 30 year old yo  here for OB follow up. She is currently 19w2d. Her pregnancy is complicated by maternal smoking and marijuana use, chlamydia in first trimester.     Plan:  # Routine Prenatal Care  -- Datinw3d US CARMITA: 2022  -- PNLs:               O Neg, Ab screen neg              RPR nr              Hep B S Ag neg              Hep C neg              Rubella immune              HIV neg             +Chlamydia s/p treatment at first OB visit              - Gonorrhea              Pap: NIL, HPV negative     -- Genetic Screening: Discussed with patient, politely declined  -- Anatomy US: Missed appt today, will reschedule  -- Immunizations: Plans Tdap at approximately 27 weeks gestation, Rhogam at 28 weeks  -- 3rd TM labs including CBC, RPR: Planned for after 27 weeks gestation  -- 1 hr GTT: Planned for next visit  -- GBS: Planned for 36 weeks  -- Postpartum Planning: To be discussed   -- Delivery Planning: No indication for early IOL at this time. To be discussed continually  -- Return to clinic in 4 weeks for OB follow up visit  -- Planning to do at next visit: 1 hr GTT if she is >24 weeks, follow up anatomy scan     # Chlamydia in first trimester  -- s/p treatment, normal test of cure   -- Plan for test of reinfection at approx 32 weeks     # O negative blood type  -- Candidate for rhogam at 28 weeks     # Maternal smoking  -- Currently smokes 3-4 cigarettes daily  -- Smoking cessation discussed and recommended : noted increased risk IUGR, oligohydramnios, preE     # Maternal marijuana use  -- Notes she uses for anxiety  -- Discussed  alternatives for anxiety and increased risk of placental complications similiarly to smoking in pregnancy  -- Offered to potentially start selective serotonin reuptake inhibitor if she feels her anxiety is unable to be controlled with lifestyle measures, she politely declines

## 2021-08-27 ENCOUNTER — HOSPITAL ENCOUNTER (OUTPATIENT)
Dept: ULTRASOUND IMAGING | Facility: OTHER | Age: 31
Discharge: HOME | End: 2021-08-27
Attending: STUDENT IN AN ORGANIZED HEALTH CARE EDUCATION/TRAINING PROGRAM | Admitting: STUDENT IN AN ORGANIZED HEALTH CARE EDUCATION/TRAINING PROGRAM
Payer: MEDICAID

## 2021-08-27 DIAGNOSIS — O99.332 MATERNAL TOBACCO USE IN SECOND TRIMESTER: ICD-10-CM

## 2021-08-27 PROCEDURE — 76805 OB US >/= 14 WKS SNGL FETUS: CPT

## 2021-09-22 ENCOUNTER — PRENATAL OFFICE VISIT (OUTPATIENT)
Dept: OBGYN | Facility: OTHER | Age: 31
End: 2021-09-22
Attending: STUDENT IN AN ORGANIZED HEALTH CARE EDUCATION/TRAINING PROGRAM
Payer: MEDICAID

## 2021-09-22 VITALS
SYSTOLIC BLOOD PRESSURE: 126 MMHG | BODY MASS INDEX: 23.08 KG/M2 | HEART RATE: 118 BPM | DIASTOLIC BLOOD PRESSURE: 72 MMHG | WEIGHT: 133.4 LBS

## 2021-09-22 DIAGNOSIS — A56.8 CHLAMYDIA TRACHOMATIS INFECTION IN MOTHER DURING FIRST TRIMESTER OF PREGNANCY: ICD-10-CM

## 2021-09-22 DIAGNOSIS — Z67.91 RH NEGATIVE STATE IN ANTEPARTUM PERIOD: ICD-10-CM

## 2021-09-22 DIAGNOSIS — O98.311 CHLAMYDIA TRACHOMATIS INFECTION IN MOTHER DURING FIRST TRIMESTER OF PREGNANCY: ICD-10-CM

## 2021-09-22 DIAGNOSIS — O99.332 MATERNAL TOBACCO USE IN SECOND TRIMESTER: Primary | ICD-10-CM

## 2021-09-22 DIAGNOSIS — O26.899 RH NEGATIVE STATE IN ANTEPARTUM PERIOD: ICD-10-CM

## 2021-09-22 PROCEDURE — 99207 PR OB VISIT-NO CHARGE - GICH ONLY: CPT | Performed by: STUDENT IN AN ORGANIZED HEALTH CARE EDUCATION/TRAINING PROGRAM

## 2021-09-22 NOTE — NURSING NOTE
Pt presents to clinic today for prenatal care 23w3d. States baby is moving good and denies any bleeding at this time.      Medication Reconciliation: complete  Paula James LPN

## 2021-09-28 ENCOUNTER — HOSPITAL ENCOUNTER (OUTPATIENT)
Dept: ULTRASOUND IMAGING | Facility: OTHER | Age: 31
Discharge: HOME | End: 2021-09-28
Attending: STUDENT IN AN ORGANIZED HEALTH CARE EDUCATION/TRAINING PROGRAM | Admitting: STUDENT IN AN ORGANIZED HEALTH CARE EDUCATION/TRAINING PROGRAM
Payer: MEDICAID

## 2021-09-28 DIAGNOSIS — O99.332 MATERNAL TOBACCO USE IN SECOND TRIMESTER: ICD-10-CM

## 2021-09-28 DIAGNOSIS — O26.899 RH NEGATIVE STATE IN ANTEPARTUM PERIOD: ICD-10-CM

## 2021-09-28 DIAGNOSIS — Z67.91 RH NEGATIVE STATE IN ANTEPARTUM PERIOD: ICD-10-CM

## 2021-09-28 PROCEDURE — 76816 OB US FOLLOW-UP PER FETUS: CPT

## 2021-09-30 DIAGNOSIS — O26.899 RH NEGATIVE STATE IN ANTEPARTUM PERIOD: Primary | ICD-10-CM

## 2021-09-30 DIAGNOSIS — O99.332 MATERNAL TOBACCO USE IN SECOND TRIMESTER: ICD-10-CM

## 2021-09-30 DIAGNOSIS — Z67.91 RH NEGATIVE STATE IN ANTEPARTUM PERIOD: Primary | ICD-10-CM

## 2021-10-06 ENCOUNTER — HOSPITAL ENCOUNTER (OUTPATIENT)
Dept: ULTRASOUND IMAGING | Facility: CLINIC | Age: 31
End: 2021-10-06
Attending: STUDENT IN AN ORGANIZED HEALTH CARE EDUCATION/TRAINING PROGRAM
Payer: MEDICAID

## 2021-10-06 ENCOUNTER — OFFICE VISIT (OUTPATIENT)
Dept: MATERNAL FETAL MEDICINE | Facility: CLINIC | Age: 31
End: 2021-10-06
Attending: STUDENT IN AN ORGANIZED HEALTH CARE EDUCATION/TRAINING PROGRAM
Payer: MEDICAID

## 2021-10-06 ENCOUNTER — HOSPITAL ENCOUNTER (OUTPATIENT)
Dept: ULTRASOUND IMAGING | Facility: OTHER | Age: 31
Discharge: HOME | End: 2021-10-06
Attending: STUDENT IN AN ORGANIZED HEALTH CARE EDUCATION/TRAINING PROGRAM | Admitting: STUDENT IN AN ORGANIZED HEALTH CARE EDUCATION/TRAINING PROGRAM
Payer: MEDICAID

## 2021-10-06 DIAGNOSIS — O99.332 MATERNAL TOBACCO USE IN SECOND TRIMESTER: ICD-10-CM

## 2021-10-06 DIAGNOSIS — O26.899 RH NEGATIVE STATE IN ANTEPARTUM PERIOD: ICD-10-CM

## 2021-10-06 DIAGNOSIS — Z67.91 RH NEGATIVE STATE IN ANTEPARTUM PERIOD: ICD-10-CM

## 2021-10-06 DIAGNOSIS — O99.332 PREGNANCY COMPLICATED BY TOBACCO USE IN SECOND TRIMESTER: Primary | ICD-10-CM

## 2021-10-06 NOTE — PROGRESS NOTES
Type of service:     Fetal anatomic survey    Date of service:     October 6, 2021    Time service began:    3:16 PM  Time service ended:    4:10 PM    Reason:      Patient conveience    Description of basis or telemedicine appropriateness:     Consultation provided at the request of Dr. More for advice regarding the diagnosis and treatment of this patient's prengnacy.  The patient's condition can be safely assessed via telemedicine.    The Mode of Transmission:    Secure interactive audio and visual telecommunication system (Video Guidance)    Location of originating and distant sites:      Originating site:   [place], [place]   Distant site:   [place], [place]        Natividad Rosales MD  , OB/GYN  Maternal-Fetal Medicine  [email or web address]  [phone] (Main MFM Office)  [phone] or [phone] (for 24 hour MFM questions)  [phone] (Pager)

## 2021-10-07 ENCOUNTER — TELEPHONE (OUTPATIENT)
Dept: OBGYN | Facility: OTHER | Age: 31
End: 2021-10-07

## 2021-10-07 NOTE — TELEPHONE ENCOUNTER
Priscilla called and would like to talk to Dr. More about her ultrasound she had yesterday.  She also said that she was having problems with a possible pinched nerve in her neck she wanted to ask about.  Becky Wang on 10/7/2021 at 1:07 PM

## 2021-10-07 NOTE — TELEPHONE ENCOUNTER
In her left hip and leg she notes that she is having pain from pinched nerve. She is struggling to get up sometimes. Discussed using tylenol, heat, ice, massage, and trying chiropractic care. She will notify the clinic with any worsening. She was notified of emergent symptoms on when to present emergently.     Jessica Hinojosa RN on 10/7/2021 at 1:21 PM

## 2021-10-08 ENCOUNTER — NURSE TRIAGE (OUTPATIENT)
Dept: NURSING | Facility: CLINIC | Age: 31
End: 2021-10-08

## 2021-10-08 ENCOUNTER — ANCILLARY PROCEDURE (OUTPATIENT)
Dept: ULTRASOUND IMAGING | Facility: OTHER | Age: 31
End: 2021-10-08
Attending: EMERGENCY MEDICINE
Payer: MEDICAID

## 2021-10-08 ENCOUNTER — HOSPITAL ENCOUNTER (OUTPATIENT)
Facility: OTHER | Age: 31
Discharge: HOME | End: 2021-10-09
Attending: EMERGENCY MEDICINE | Admitting: STUDENT IN AN ORGANIZED HEALTH CARE EDUCATION/TRAINING PROGRAM
Payer: MEDICAID

## 2021-10-08 DIAGNOSIS — O99.332 TOBACCO SMOKING COMPLICATING PREGNANCY IN SECOND TRIMESTER: ICD-10-CM

## 2021-10-08 DIAGNOSIS — Z3A.25 25 WEEKS GESTATION OF PREGNANCY: ICD-10-CM

## 2021-10-08 DIAGNOSIS — F17.210 CIGARETTE SMOKER: ICD-10-CM

## 2021-10-08 DIAGNOSIS — R10.32 LEFT LOWER QUADRANT PAIN: ICD-10-CM

## 2021-10-08 DIAGNOSIS — O99.891 PREGNANCY COMPLICATION BEFORE BIRTH: ICD-10-CM

## 2021-10-08 PROCEDURE — 76815 OB US LIMITED FETUS(S): CPT | Mod: TC | Performed by: EMERGENCY MEDICINE

## 2021-10-08 PROCEDURE — 81001 URINALYSIS AUTO W/SCOPE: CPT | Performed by: EMERGENCY MEDICINE

## 2021-10-08 PROCEDURE — 99285 EMERGENCY DEPT VISIT HI MDM: CPT | Mod: 25 | Performed by: EMERGENCY MEDICINE

## 2021-10-08 PROCEDURE — 76815 OB US LIMITED FETUS(S): CPT | Mod: 26 | Performed by: EMERGENCY MEDICINE

## 2021-10-09 ENCOUNTER — HOSPITAL ENCOUNTER (OUTPATIENT)
Facility: OTHER | Age: 31
End: 2021-10-09
Admitting: STUDENT IN AN ORGANIZED HEALTH CARE EDUCATION/TRAINING PROGRAM
Payer: MEDICAID

## 2021-10-09 VITALS
RESPIRATION RATE: 16 BRPM | HEART RATE: 87 BPM | BODY MASS INDEX: 23.84 KG/M2 | DIASTOLIC BLOOD PRESSURE: 61 MMHG | OXYGEN SATURATION: 98 % | WEIGHT: 137.8 LBS | SYSTOLIC BLOOD PRESSURE: 112 MMHG | TEMPERATURE: 96.9 F

## 2021-10-09 PROBLEM — Z36.89 ENCOUNTER FOR TRIAGE IN PREGNANT PATIENT: Status: ACTIVE | Noted: 2021-10-09

## 2021-10-09 LAB
ALBUMIN UR-MCNC: NEGATIVE MG/DL
AMORPH CRY #/AREA URNS HPF: ABNORMAL /HPF
APPEARANCE UR: ABNORMAL
BILIRUB UR QL STRIP: NEGATIVE
COLOR UR AUTO: ABNORMAL
GLUCOSE UR STRIP-MCNC: NEGATIVE MG/DL
HGB UR QL STRIP: NEGATIVE
KETONES UR STRIP-MCNC: NEGATIVE MG/DL
LEUKOCYTE ESTERASE UR QL STRIP: NEGATIVE
MUCOUS THREADS #/AREA URNS LPF: PRESENT /LPF
NITRATE UR QL: NEGATIVE
PH UR STRIP: 7 [PH] (ref 5–9)
RBC URINE: <1 /HPF
SP GR UR STRIP: 1.01 (ref 1–1.03)
SQUAMOUS EPITHELIAL: 5 /HPF
UROBILINOGEN UR STRIP-MCNC: NORMAL MG/DL
WBC URINE: 2 /HPF

## 2021-10-09 PROCEDURE — G0463 HOSPITAL OUTPT CLINIC VISIT: HCPCS | Mod: 25,27

## 2021-10-09 PROCEDURE — G0463 HOSPITAL OUTPT CLINIC VISIT: HCPCS

## 2021-10-09 PROCEDURE — 250N000013 HC RX MED GY IP 250 OP 250 PS 637: Performed by: STUDENT IN AN ORGANIZED HEALTH CARE EDUCATION/TRAINING PROGRAM

## 2021-10-09 RX ORDER — ACETAMINOPHEN 325 MG/1
975 TABLET ORAL ONCE
Status: COMPLETED | OUTPATIENT
Start: 2021-10-09 | End: 2021-10-09

## 2021-10-09 RX ORDER — HYDROXYZINE PAMOATE 25 MG/1
50 CAPSULE ORAL ONCE
Status: DISCONTINUED | OUTPATIENT
Start: 2021-10-09 | End: 2021-10-09 | Stop reason: HOSPADM

## 2021-10-09 RX ADMIN — ACETAMINOPHEN 975 MG: 325 TABLET, FILM COATED ORAL at 02:07

## 2021-10-09 NOTE — ED PROVIDER NOTES
"  History     Chief Complaint   Patient presents with     Abdominal Pain     HPI  Priscilla Garcia is a 31 year old female who presents with abdominal pain.  She is a  at 25 weeks and 4 days.  She reports left lower quadrant and suprapubic pain starting around noon today (12 hours prior to arrival).  Pain is intermittent lasting up to 7 minutes.  She reports it is cramping.  She did have some nausea associated but had severe heartburn at the time so is unsure if the nausea is related to the heartburn or due to the new pain.  The pain radiates upward to her flank.  She did not take anything for pain.  She did have some lightheadedness today and seeing \"spots\" in front of her eyes.  She denies chest pain or shortness of breath.  She states she may have had a loose stool today.  She may have had some urgency with urination but has not noticed any particular dysuria.  She has not noticed any bleeding or loss of fluid from the vagina.  She has not had any falls or trauma.  She is O-.  Previous pregnancies complicated by shoulder dystocia and placental abruption.  Current pregnancy has a marginal umbilical cord.    She is a current smoker, is attempting to cut back during her pregnancy.  She is not interested in patches or gum right now.  She denies alcohol.  She reports occasional marijuana use but says she has mostly quit during pregnancy.    Allergies:  Allergies   Allergen Reactions     Latex Other (See Comments) and Rash     Local reaction of irritation/numbness     Coconut [Coconut Oil] Hives and Difficulty breathing     Aspirin Hives and Other (See Comments)     Other reaction(s): Bleeding  Family disorder of bleeding so she does not use     Coconut Oil Rash     Other reaction(s): Angioedema       Problem List:    Patient Active Problem List    Diagnosis Date Noted     Marijuana use 2015     Priority: Medium     Rh negative state in antepartum period 2015     Priority: Medium     SGA (small for " gestational age) 07/28/2015     Priority: Medium     Nausea 01/21/2015     Priority: Medium     Supervision of normal first pregnancy 10/17/2012     Priority: Medium     Rh negative status during pregnancy 09/13/2012     Priority: Medium     Need for Tdap vaccination 09/12/2012     Priority: Medium     Tdap after 20 weeks gestation       Colloid cyst of third ventricle (H) 06/09/2011     Priority: Medium     There is a small, 7 mm area of high signal intensity seen on sagittal midline images in the anterior aspect of the third ventricle.A followup study in 6-9 months could be performed to confirm that this finding remains stable         Disorder of ovary 01/22/2009     Priority: Medium     2009 January          Past Medical History:    Past Medical History:   Diagnosis Date     Anxiety disorder      Congenital cerebral cysts (H)      Endometriosis        Past Surgical History:    Past Surgical History:   Procedure Laterality Date     LAPAROSCOPY DIAGNOSTIC (GYN)      2009,pelvic endometriosis       Family History:    History reviewed. No pertinent family history.    Social History:  Marital Status:  Single [1]  Social History     Tobacco Use     Smoking status: Light Tobacco Smoker     Packs/day: 0.25     Years: 1.50     Pack years: 0.37     Types: Cigarettes     Smokeless tobacco: Never Used     Tobacco comment: Quit smoking: cutting  back   Vaping Use     Vaping Use: Never used   Substance Use Topics     Alcohol use: No     Alcohol/week: 0.0 standard drinks     Comment: Alcoholic Drinks/day: occasionally     Drug use: Yes     Types: Marijuana     Comment: several times per week        Medications:    Prenatal Vit-Fe Sulfate-FA (PRENATAL MULTIVIT-IRON PO)          Review of Systems  Please seen HPI for pertinent positives and negatives. All other systems reviewed and found to be negative.   Physical Exam   BP: 120/53  Temp: 97.9  F (36.6  C)  Resp: 16  Weight: 62.5 kg (137 lb 12.8 oz)  SpO2: 98 %      Physical  Exam  Constitutional:       General: She is not in acute distress.     Appearance: She is not ill-appearing.   HENT:      Head: Normocephalic and atraumatic.   Eyes:      General: No scleral icterus.  Cardiovascular:      Rate and Rhythm: Normal rate and regular rhythm.   Pulmonary:      Effort: Pulmonary effort is normal.      Breath sounds: Normal breath sounds.   Abdominal:      Palpations: Abdomen is soft.      Tenderness: There is abdominal tenderness (slight subrapubic tenderness, center and just left of center).   Skin:     General: Skin is warm and dry.   Neurological:      Mental Status: She is alert and oriented to person, place, and time.         ED Course     ED Course as of Oct 09 0133   Fri Oct 08, 2021   2303 Patient is O negative      Sat Oct 09, 2021   0113 Urinalysis unremarkable      0132 Spoke with Dr More, will send up for NST, minimal pain right now so lower suspicion for torsion, will not order formal US at this time.        Procedures    Results for orders placed during the hospital encounter of 10/08/21    POC US OB TRANSABDOMINAL LIMITED    Impression  Limited Bedside Transabdominal ultrasound for evaluation of IUP  Performed any interpreted by me.    Indication:  abdominal pain  Findings:  The lower abdomen was interrogated with a curvilinear probe. The uterus was identified.  Within the uterus there is a moving fetus with visible heart rate with FHR of ~140    Impression: Intrauterine pregnancy            Critical Care time:  none               Results for orders placed or performed during the hospital encounter of 10/08/21 (from the past 24 hour(s))   POC US OB TRANSABDOMINAL LIMITED    Impression    Limited Bedside Transabdominal ultrasound for evaluation of IUP        Performed any interpreted by me.    Indication:  abdominal pain  Findings:  The lower abdomen was interrogated with a curvilinear probe. The uterus was identified.   Within the uterus there is a moving fetus with  visible heart rate with FHR of ~140    Impression: Intrauterine pregnancy     UA with Microscopic reflex to Culture    Specimen: Urine, Midstream   Result Value Ref Range    Color Urine Light Yellow Colorless, Straw, Light Yellow, Yellow    Appearance Urine Slightly Cloudy (A) Clear    Glucose Urine Negative Negative mg/dL    Bilirubin Urine Negative Negative    Ketones Urine Negative Negative mg/dL    Specific Gravity Urine 1.012 1.000 - 1.030    Blood Urine Negative Negative    pH Urine 7.0 5.0 - 9.0    Protein Albumin Urine Negative Negative mg/dL    Urobilinogen Urine Normal Normal, 2.0 mg/dL    Nitrite Urine Negative Negative    Leukocyte Esterase Urine Negative Negative    Mucus Urine Present (A) None Seen /LPF    Amorphous Crystals Urine Few (A) None Seen /HPF    RBC Urine <1 <=2 /HPF    WBC Urine 2 <=5 /HPF    Squamous Epithelials Urine 5 (H) <=1 /HPF    Narrative    Urine Culture not indicated       Medications - No data to display    Assessments & Plan (with Medical Decision Making)     I have reviewed the nursing notes.    I have reviewed the findings, diagnosis, plan and need for follow up with the patient.   Ms Garcia is a 30 yo  who presents to the emergency department with mild intermittent cramping suprapubic and LLQ pain.  Pain location is not consistent with appendicitis.  She has no fever or vomiting, did have some reflux and associated nausea.  Bedside ultrasound shows intrauterine pregnancy with appropriate heart rate.  Unable to visualize adnexa clearly, some shadowing due to fetal skull but no obvious free fluid.  Does not appear to be surgical emergency at this time, consider UTI.  After UA will send to OB/GYN, consider formal ultrasound, nonstress testing.    New Prescriptions    No medications on file       Final diagnoses:   Left lower quadrant pain       10/8/2021   [facility]     Lo Olivares MD  10/09/21 0133

## 2021-10-09 NOTE — ED TRIAGE NOTES
ED Nursing Triage Note (General)   ________________________________    Priscilla Garcia is a 31 year old Female that presents to triage private car  With history of  Pt states that she has been having abdominal pain above her pubic area and pain on the top of her abdomen.  Pt states she is 25 weeks 4 days pregnant. Pt reports that pain starting at noon today.  Pt states she was folding laundry and started seeing white spots that lasted 30-60 seconds reported by patient.   Significant symptoms had onset 12 hour(s) ago.    Patient appears alert , in no acute distress., and cooperative behavior.    GCS Eye Opening = 4=Spontaneous  Airway: intact  Breathing noted as Normal  Circulation Normal  Skin:  Normal  Action taken:  Triage to critical care immediately      PRE HOSPITAL PRIOR LIVING SITUATION Alone

## 2021-10-09 NOTE — DISCHARGE INSTRUCTIONS
Please call [facility] at [phone] or Dr. More at [phone] if your symptoms return. Call if you are having contractions every 3-5 minutes lasting 60-90 seconds and increasing in intensity. If you are having vaginal bleeding, fluid leaking or if you are not feeling your baby move return to [facility]. Feel free to call with any questions or concerns.

## 2021-10-09 NOTE — TELEPHONE ENCOUNTER
"OB Triage Call      Is patient's OB/Midwife with the [facility] or [facility]? [facility]- Proceed with triage     Reason for call: Upper abdominal pain and low L sided cramping pain.  She also had an episode of \"seeing spots\" while sitting down today.     Assessment: 25w4d hx of \"umbilical cord attached to the side of the placenta\".  Possible LOF.  No bleeding.     Plan: L&D    Patient plans to deliver at [facility] ([facility])    Patient's primary OB Provider is Dr. Brooklyn More.      Per protocol recommendations Patient to be evaluated in L&D. Patient's primary OB is [facility] ([facility])- L&D at [facility] ([phone]) to notify B nurse that you advised patient to go in.  Notification to OB (MD) provider is not needed.  There are no on-call OB providers for FNA.      Is patient's delivering hospital on divert? [facility] ([facility]) patient- If [facility] is on divert, the [facility] nurse will assist in directions of next steps for patient. Contact [facility] at [phone].       25w4d    Estimated Date of Delivery: 2022        OB History    Para Term  AB Living   5 3 3 0 1 3   SAB TAB Ectopic Multiple Live Births   1 0 0 0 3      # Outcome Date GA Lbr Vijay/2nd Weight Sex Delivery Anes PTL Lv   5 Current            4 Term 17 40w5d   F Vag-Spont   MANNIE   3 SAB 16           2 Term 07/28/15 40w4d  3.005 kg (6 lb 10 oz) M Vag-Vacuum None N MANNIE      Complications: Abruptio Placenta      Apgar1: 9  Apgar5: 9   1 Term 13 40w1d  2.92 kg (6 lb 7 oz) F Vag-Spont EPI  MANNIE       No results found for: GBS       Nasreen Briscoe RN 10/08/21 9:58 PM  [facility] Nurse Advisor      Reason for Disposition    [1] Having contractions or other symptoms of labor (such as vaginal pressure) AND [2] < 37 weeks pregnant (i.e., )    MODERATE-SEVERE abdominal pain    Additional Information    Negative: Passed out (i.e., lost consciousness, collapsed and was not responding)    Negative: Shock " "suspected (e.g., cold/pale/clammy skin, too weak to stand, low BP, rapid pulse)    Negative: Difficult to awaken or acting confused (e.g., disoriented, slurred speech)    Negative: [1] SEVERE abdominal pain (e.g., excruciating) AND [2] constant AND [3] present > 1 hour    Negative: SEVERE vaginal bleeding (e.g., continuous red blood from vagina, large blood clots)    Negative: Sounds like a life-threatening emergency to the triager    Negative: Passed out (i.e., lost consciousness, collapsed and was not responding)    Negative: Shock suspected (e.g., cold/pale/clammy skin, too weak to stand, low BP, rapid pulse)    Negative: Difficult to awaken or acting confused (e.g., disoriented, slurred speech)    Negative: [1] SEVERE abdominal pain (e.g., excruciating) AND [2] constant AND [3] present > 1 hour    Negative: Severe bleeding (e.g., continuous red blood from vagina, or large blood clots)    Negative: Sounds like a life-threatening emergency to the triager    Negative: Umbilical cord hanging out of the vagina (shiny, white, curled appearance, \"like telephone cord\")    Negative: Uncontrollable urge to push (i.e., feels like baby is coming out now)    Negative: Can see baby    Protocols used: PREGNANCY - ABDOMINAL PAIN GREATER THAN 20 WEEKS EGA-A-, PREGNANCY - LABOR - [id number]      "

## 2021-10-09 NOTE — PROGRESS NOTES
NSG DISCHARGE NOTE    Patient discharged to home at 4:05 AM via ambulation. Accompanied by spouse and staff. Discharge instructions reviewed with patient, opportunity offered to ask questions. Prescriptions - None ordered for discharge. All belongings sent with patient.    Aishwarya Collier RN

## 2021-10-11 ENCOUNTER — THERAPY VISIT (OUTPATIENT)
Dept: CHIROPRACTIC MEDICINE | Facility: OTHER | Age: 31
End: 2021-10-11
Attending: CHIROPRACTOR
Payer: MEDICAID

## 2021-10-11 VITALS
OXYGEN SATURATION: 97 % | HEART RATE: 95 BPM | TEMPERATURE: 98 F | DIASTOLIC BLOOD PRESSURE: 66 MMHG | SYSTOLIC BLOOD PRESSURE: 118 MMHG | RESPIRATION RATE: 16 BRPM

## 2021-10-11 DIAGNOSIS — M99.04 SEGMENTAL AND SOMATIC DYSFUNCTION OF SACRAL REGION: Primary | ICD-10-CM

## 2021-10-11 DIAGNOSIS — M54.42 ACUTE LEFT-SIDED LOW BACK PAIN WITH LEFT-SIDED SCIATICA: ICD-10-CM

## 2021-10-11 PROCEDURE — G0463 HOSPITAL OUTPT CLINIC VISIT: HCPCS

## 2021-10-11 PROCEDURE — 99213 OFFICE O/P EST LOW 20 MIN: CPT | Mod: 25 | Performed by: CHIROPRACTOR

## 2021-10-11 PROCEDURE — 98940 CHIROPRACT MANJ 1-2 REGIONS: CPT | Mod: AT | Performed by: CHIROPRACTOR

## 2021-10-11 NOTE — PROGRESS NOTES
PATIENT:  Priscilla Garcia is a 31 year old female presenting for left-sided low back pain with sciatica    PROBLEM:   Date of Initial Visit for this Episode:  10/11/2021    Visit #1    SUBJECTIVE / HPI: Patient recommended to follow-up with our office by OB nurse regarding low back pain with left-sided sciatica.  States that symptoms began approximately 1.5 weeks ago.  Exact etiology uncertain as patient history shows no recent overuse or traumatic type events.  States prior history of similar symptoms with first pregnancy however this was on the right side.  States she worked with a chiropractor in [facility] which seem to provide good relief of symptoms.  Patient states that she was also experiencing abdominal pain left side which caused her to be hospitalized.  Tests encouraging as they showed no concerns to pregnancy, visceral origin, or other sinister factors.  No reported loss of bowel or bladder function, does have occasional leakage due to pregnancy.  No lower extremity weakness reported, or saddle paresthesia at this time  Description and onset:  Duration and Frequency of Pain: 1.5 weeks and constant pinching  Radiation of pain: left leg  Pain rated at it's worst: 7/10  Pain rated currently:  3/10  Pain course: Gradually getting worse  Worse with:  Hip flexion (I.e. getting into her truck)  Additional Features: 26 weeks pregnant  Other Health Care Providers seen for this: Dr. Brooklyn Linda MD, Chiropractor [facility]  Previous treatment: history of chiropractic care, currently evaluation  Previous injury:no significant trauma, reports history of similar symptoms during first pregnancy on right side    See flowsheets in chart for details.  10/11/2021    Oswestry (CIARA) Questionnaire    OSWESTRY DISABILITY INDEX 10/11/2021   Count 9   Sum 22   Oswestry Score (%) 48.89   Some recent data might be hidden      Functional limitations:  Standing to do dishes, self care, sitting >30mins. Stand >10 minutes,      Past D.C. Care: yes, helpful    PAST MEDICAL HISTORY:  Past Medical History:   Diagnosis Date     Anxiety disorder     No Comments Provided     Congenital cerebral cysts (H)     patient reported     Endometriosis     No Comments Provided       PAST SURGICAL HISTORY:  Past Surgical History:   Procedure Laterality Date     LAPAROSCOPY DIAGNOSTIC (GYN)      2009,pelvic endometriosis       ALLERGIES:  Allergies   Allergen Reactions     Latex Other (See Comments) and Rash     Local reaction of irritation/numbness     Coconut [Coconut Oil] Hives and Difficulty breathing     Aspirin Hives and Other (See Comments)     Other reaction(s): Bleeding  Family disorder of bleeding so she does not use     Coconut Oil Rash     Other reaction(s): Angioedema       CURRENT MEDICATIONS:  Current Outpatient Medications   Medication Sig Dispense Refill     Prenatal Vit-Fe Sulfate-FA (PRENATAL MULTIVIT-IRON PO) Take  by mouth.         SOCIAL HISTORY:  Marital Status: living with significant other.  Children: yes.  Occupation: Unemployed.  Alcohol use:none.  Tobacco use: Smoker: yes.  Are you or have you used illicit drugs:  yes, marijuana.    FAMILY HISTORY:  History reviewed. No pertinent family history.    Patient Active Problem List   Diagnosis     Disorder of ovary     Colloid cyst of third ventricle (H)     Need for Tdap vaccination     Rh negative status during pregnancy     Supervision of normal first pregnancy     Marijuana use     Nausea     Rh negative state in antepartum period     SGA (small for gestational age)     Encounter for triage in pregnant patient         ROS:  The patient denies any fevers, chills, nausea, vomiting, diarrhea, constipation,dysuria, hematuria, or urinary hesitancy or incontinence.  No shortness of breath, chest pain, or rashes.    OBJECTIVE:    DIAGNOSTICS:  No current spinal imaging taken.   /66   Pulse 95   Temp 98  F (36.7  C) (Tympanic)   Resp 16   LMP 03/28/2021 (Exact Date)   SpO2  "97%      PHYSICAL EXAM:     GENERAL APPEARANCE: healthy, alert, mild distress and cooperative   GAIT: NORMAL      MUSCULOSKELETAL:   Posture: Anterior head carriage, rounded shoulders.  Low right iliac crest  Gait:  unremarkable.     Thoracic and Lumbar performed actively, measured approximately  ROM:  55/60 flexion 45/60 extension with pain   45/45 RLF left sided pain    40/45 LLF       +Kemps: left SI joint  Straight leg raise: -right, +left  +Leg length inequality: right 1/2\"    Other:  Ely's: +right, +left    +Tenderness: Left PSIS  +Muscle spasm: Left quadratus lumborum, left piriformis, right gluteus medius  +Joint asymmetry and restriction: Sacrum with extension and left lateral flexion, P-L listing    ASSESSMENT: Priscilla Garcia is a 31 year old female presenting with primary complaints of acute left low back pain with left-sided sciatica.  There is evidence of segmental/somatic dysfunction consistent with patient's symptoms.  I do believe patient is a good candidate for chiropractic care and should respond favorably with course of treatment.  Suspect that abdominal pain might be secondary to spasm of the psoas muscle.  Continue to monitor this symptom during course of care.  No contraindications precluding patient from receiving care today.     1. Segmental and somatic dysfunction of sacral region    2. Acute left-sided low back pain with left-sided sciatica        PLAN    Evaluation and Management:  01436 Moderate exam established patient 20 min    Procedures:  Modalities:  None performed this visit    CMT:  64550 Chiropractic manipulative treatment 1-2 regions performed   Pelvis: Diversified, Sacrum , Side posture    Therapeutic procedures:  Resisted knee abduction, adduction  Resisted hip flexion    Response to Treatment  Reduction in symptoms as reported by patient    Prognosis: Excellent    10/11/2021 Plan of Care:  6-8 visits of Chiropractic Care including Spinal Adjustments and/or physiotherapy and " active rehabilitation, to include exercises in the office and/or at home to meet care plan goals.     Frequency: 2xweek for up to 4 weeks. A reevaluation would be clinically appropriate in 6-8 visits, to determine progress and further course of care.    POC discussed and patient agreeable to plan of care.      10/11/2021 Goals:      Patient will report improved pain.   Patient will report able to sit/stand without painful limits.   Patient will report able to wash dishes without painfulimits.   Patient will demonstrate an improved ability to complete Activities of Daily Living  as shown by a reported 10-30% reduced score on back index.    Patient will demonstrate improved ROM.        INSTRUCTIONS   ice 20 minutes every other hour as needed, heat 15 minutes every other hour as needed and perform home exercises as instructed    Follow-up:  Return to care in 3 days.

## 2021-10-12 NOTE — PATIENT INSTRUCTIONS
ice 20 minutes every other hour as needed, heat 15 minutes every other hour as needed and perform home exercises as instructed

## 2021-10-13 ENCOUNTER — PRENATAL OFFICE VISIT (OUTPATIENT)
Dept: OBGYN | Facility: OTHER | Age: 31
End: 2021-10-13
Attending: STUDENT IN AN ORGANIZED HEALTH CARE EDUCATION/TRAINING PROGRAM
Payer: MEDICAID

## 2021-10-13 VITALS
DIASTOLIC BLOOD PRESSURE: 62 MMHG | WEIGHT: 136 LBS | SYSTOLIC BLOOD PRESSURE: 108 MMHG | HEART RATE: 102 BPM | BODY MASS INDEX: 23.53 KG/M2

## 2021-10-13 DIAGNOSIS — Z67.91 RH NEGATIVE STATE IN ANTEPARTUM PERIOD: ICD-10-CM

## 2021-10-13 DIAGNOSIS — O26.899 RH NEGATIVE STATE IN ANTEPARTUM PERIOD: ICD-10-CM

## 2021-10-13 DIAGNOSIS — O99.332 TOBACCO SMOKING COMPLICATING PREGNANCY IN SECOND TRIMESTER: Primary | ICD-10-CM

## 2021-10-13 LAB
BASOPHILS # BLD AUTO: 0.1 10E3/UL (ref 0–0.2)
BASOPHILS NFR BLD AUTO: 1 %
EOSINOPHIL # BLD AUTO: 0.2 10E3/UL (ref 0–0.7)
EOSINOPHIL NFR BLD AUTO: 2 %
ERYTHROCYTE [DISTWIDTH] IN BLOOD BY AUTOMATED COUNT: 13 % (ref 10–15)
GLUCOSE 1H P 50 G GLC PO SERPL-MCNC: 140 MG/DL (ref 70–129)
HCT VFR BLD AUTO: 36.6 % (ref 35–47)
HGB BLD-MCNC: 12.6 G/DL (ref 11.7–15.7)
IMM GRANULOCYTES # BLD: 0.2 10E3/UL
IMM GRANULOCYTES NFR BLD: 2 %
LYMPHOCYTES # BLD AUTO: 1.1 10E3/UL (ref 0.8–5.3)
LYMPHOCYTES NFR BLD AUTO: 11 %
MCH RBC QN AUTO: 30.6 PG (ref 26.5–33)
MCHC RBC AUTO-ENTMCNC: 34.4 G/DL (ref 31.5–36.5)
MCV RBC AUTO: 89 FL (ref 78–100)
MONOCYTES # BLD AUTO: 0.7 10E3/UL (ref 0–1.3)
MONOCYTES NFR BLD AUTO: 7 %
NEUTROPHILS # BLD AUTO: 7.4 10E3/UL (ref 1.6–8.3)
NEUTROPHILS NFR BLD AUTO: 77 %
NRBC # BLD AUTO: 0 10E3/UL
NRBC BLD AUTO-RTO: 0 /100
PLATELET # BLD AUTO: 197 10E3/UL (ref 150–450)
RBC # BLD AUTO: 4.12 10E6/UL (ref 3.8–5.2)
WBC # BLD AUTO: 9.6 10E3/UL (ref 4–11)

## 2021-10-13 PROCEDURE — 82952 GTT-ADDED SAMPLES: CPT | Mod: ZL | Performed by: STUDENT IN AN ORGANIZED HEALTH CARE EDUCATION/TRAINING PROGRAM

## 2021-10-13 PROCEDURE — 86780 TREPONEMA PALLIDUM: CPT | Mod: ZL | Performed by: STUDENT IN AN ORGANIZED HEALTH CARE EDUCATION/TRAINING PROGRAM

## 2021-10-13 PROCEDURE — 85025 COMPLETE CBC W/AUTO DIFF WBC: CPT | Mod: ZL | Performed by: STUDENT IN AN ORGANIZED HEALTH CARE EDUCATION/TRAINING PROGRAM

## 2021-10-13 PROCEDURE — 90715 TDAP VACCINE 7 YRS/> IM: CPT

## 2021-10-13 PROCEDURE — 36415 COLL VENOUS BLD VENIPUNCTURE: CPT | Mod: ZL | Performed by: STUDENT IN AN ORGANIZED HEALTH CARE EDUCATION/TRAINING PROGRAM

## 2021-10-13 PROCEDURE — 99207 PR OB VISIT-NO CHARGE - GICH ONLY: CPT | Performed by: STUDENT IN AN ORGANIZED HEALTH CARE EDUCATION/TRAINING PROGRAM

## 2021-10-13 NOTE — NURSING NOTE
Pt presents to clinic today for prenatal care 26w2d. Pt denies any contractions, bleeding, or leakage of fluid at this time. States baby is moving good.      Medication Reconciliation: complete  Paula James LPN

## 2021-10-13 NOTE — PROGRESS NOTES
Return OB Visit    S: Ms. Garcia is feeling well today. She has no acute concerns. Denies leaking of fluid, vaginal bleeding, painful contractions. Notes fetal movements.    O:   /62   Pulse 102   Wt 61.7 kg (136 lb)   LMP 2021 (Exact Date)   BMI 23.53 kg/m      Gen: Well-appearing, NAD  See OB Flowsheet    FH 25 cm   bpm    Assessment:  Ms. Priscilla Garcia is a 30 year old yo  here for OB follow up. She is currently 26w2d. Her pregnancy is complicated by maternal smoking and marijuana use, chlamydia in first trimester.     Plan:  # Routine Prenatal Care  -- Datinw3d US CARMITA: 2022  -- PNLs:               O Neg, Ab screen neg              RPR nr              Hep B S Ag neg              Hep C neg              Rubella immune              HIV neg             +Chlamydia s/p treatment at first OB visit              - Gonorrhea              Pap: NIL, HPV negative     -- Genetic Screening: Discussed with patient, politely declined  -- Anatomy US:  58%ile, CL 4.2 cm anterior placenta  -- Immunizations: Plans Tdap today, Rhogam at 28 weeks- lab-only   -- 3rd TM labs including CBC, RPR: Today  -- 1 hr GTT: Today  -- GBS: Planned for 36 weeks  -- Postpartum Planning: To be discussed   -- Delivery Planning: No indication for early IOL at this time. To be discussed continually  -- Return to clinic in 4 weeks for OB follow up visit  -- Planning to do at next visit: follow up labs     # Chlamydia in first trimester  -- s/p treatment, normal test of cure   -- Plan for test of reinfection at approx 32 weeks     # O negative blood type  -- Candidate for rhogam at 28 weeks     # Maternal smoking  -- Currently smokes 3-4 cigarettes daily  -- Smoking cessation discussed and recommended : noted increased risk IUGR, oligohydramnios, preE     # Maternal marijuana use  -- Notes she uses for anxiety  -- Discussed alternatives for anxiety and increased risk of placental complications similiarly to  smoking in pregnancy  -- Offered to potentially start selective serotonin reuptake inhibitor if she feels her anxiety is unable to be controlled with lifestyle measures, she politely declines

## 2021-10-14 ENCOUNTER — TELEPHONE (OUTPATIENT)
Dept: OBGYN | Facility: OTHER | Age: 31
End: 2021-10-14

## 2021-10-14 DIAGNOSIS — Z34.00 SUPERVISION OF NORMAL FIRST PREGNANCY: Primary | ICD-10-CM

## 2021-10-14 LAB — T PALLIDUM AB SER QL: NONREACTIVE

## 2021-10-14 NOTE — PROGRESS NOTES
Patient is coming for lab only appt tomorrow, 10/15 at 7:10 a.m. Note says 3 hour glucose. Please place orders.  Thanks,  Lab

## 2021-10-15 ENCOUNTER — LAB (OUTPATIENT)
Dept: LAB | Facility: OTHER | Age: 31
End: 2021-10-15
Attending: STUDENT IN AN ORGANIZED HEALTH CARE EDUCATION/TRAINING PROGRAM
Payer: MEDICAID

## 2021-10-15 DIAGNOSIS — Z34.00 SUPERVISION OF NORMAL FIRST PREGNANCY: ICD-10-CM

## 2021-10-15 LAB
GESTATIONAL GTT 1 HR POST DOSE: 109 MG/DL (ref 60–179)
GESTATIONAL GTT 2 HR POST DOSE: 68 MG/DL (ref 60–154)
GESTATIONAL GTT 3 HR POST DOSE: 54 MG/DL (ref 60–139)
GLUCOSE P FAST SERPL-MCNC: 90 MG/DL (ref 60–94)

## 2021-10-15 PROCEDURE — 82951 GLUCOSE TOLERANCE TEST (GTT): CPT | Mod: ZL

## 2021-10-15 PROCEDURE — 82950 GLUCOSE TEST: CPT | Mod: ZL

## 2021-10-15 PROCEDURE — 36415 COLL VENOUS BLD VENIPUNCTURE: CPT | Mod: ZL

## 2021-10-15 PROCEDURE — 82947 ASSAY GLUCOSE BLOOD QUANT: CPT | Mod: ZL

## 2021-10-27 ENCOUNTER — ALLIED HEALTH/NURSE VISIT (OUTPATIENT)
Dept: OBGYN | Facility: OTHER | Age: 31
End: 2021-10-27
Attending: STUDENT IN AN ORGANIZED HEALTH CARE EDUCATION/TRAINING PROGRAM
Payer: MEDICAID

## 2021-10-27 DIAGNOSIS — Z67.91 RH NEGATIVE STATE IN ANTEPARTUM PERIOD: Primary | ICD-10-CM

## 2021-10-27 DIAGNOSIS — O26.899 RH NEGATIVE STATE IN ANTEPARTUM PERIOD: Primary | ICD-10-CM

## 2021-10-27 DIAGNOSIS — Z34.03 ENCOUNTER FOR SUPERVISION OF NORMAL FIRST PREGNANCY IN THIRD TRIMESTER: ICD-10-CM

## 2021-10-27 PROCEDURE — 96372 THER/PROPH/DIAG INJ SC/IM: CPT | Performed by: STUDENT IN AN ORGANIZED HEALTH CARE EDUCATION/TRAINING PROGRAM

## 2021-10-27 PROCEDURE — 250N000011 HC RX IP 250 OP 636: Performed by: STUDENT IN AN ORGANIZED HEALTH CARE EDUCATION/TRAINING PROGRAM

## 2021-10-27 RX ADMIN — HUMAN RHO(D) IMMUNE GLOBULIN 300 MCG: 1500 SOLUTION INTRAMUSCULAR; INTRAVENOUS at 09:48

## 2021-10-27 NOTE — NURSING NOTE
Clinic Administered Medication Documentation    Administrations This Visit     rho(D) immune globulin (RHOPHYLAC) injection 300 mcg     Admin Date  10/27/2021 Action  Given Dose  300 mcg Route  Intramuscular Site  Right Ventrogluteal Administered By  Coni Tran RN    Ordering Provider: Brooklyn More MD    Patient Supplied?: No                  Injectable Medication Documentation    Patient was given Rhogam. Prior to medication administration, verified patients identity using patient s name and date of birth. Please see MAR and medication order for additional information. Patient instructed to report any adverse reaction to staff immediately  and stay in clinic after the injection but patient declined.      Was entire vial of medication used? Yes  Vial/Syringe: Syringe    Coni Tran RN...................10/27/2021 9:49 AM

## 2021-11-11 ENCOUNTER — PRENATAL OFFICE VISIT (OUTPATIENT)
Dept: OBGYN | Facility: OTHER | Age: 31
End: 2021-11-11
Attending: STUDENT IN AN ORGANIZED HEALTH CARE EDUCATION/TRAINING PROGRAM
Payer: COMMERCIAL

## 2021-11-11 VITALS
DIASTOLIC BLOOD PRESSURE: 64 MMHG | SYSTOLIC BLOOD PRESSURE: 108 MMHG | BODY MASS INDEX: 24.05 KG/M2 | WEIGHT: 139 LBS | HEART RATE: 92 BPM

## 2021-11-11 DIAGNOSIS — O26.899 RH NEGATIVE STATE IN ANTEPARTUM PERIOD: ICD-10-CM

## 2021-11-11 DIAGNOSIS — Z67.91 RH NEGATIVE STATE IN ANTEPARTUM PERIOD: ICD-10-CM

## 2021-11-11 DIAGNOSIS — K21.00 GASTROESOPHAGEAL REFLUX DISEASE WITH ESOPHAGITIS WITHOUT HEMORRHAGE: Primary | ICD-10-CM

## 2021-11-11 DIAGNOSIS — O99.332 TOBACCO SMOKING COMPLICATING PREGNANCY IN SECOND TRIMESTER: ICD-10-CM

## 2021-11-11 PROCEDURE — 99207 PR OB VISIT-NO CHARGE - GICH ONLY: CPT | Performed by: STUDENT IN AN ORGANIZED HEALTH CARE EDUCATION/TRAINING PROGRAM

## 2021-11-11 RX ORDER — FAMOTIDINE 20 MG/1
20 TABLET, FILM COATED ORAL 2 TIMES DAILY PRN
Qty: 60 TABLET | Refills: 0 | Status: SHIPPED | OUTPATIENT
Start: 2021-11-11 | End: 2022-01-06

## 2021-11-11 ASSESSMENT — PAIN SCALES - GENERAL: PAINLEVEL: MILD PAIN (3)

## 2021-11-11 NOTE — NURSING NOTE
Chief Complaint   Patient presents with     Prenatal Care     30w3d     Offers no complaints  Shara Baltazar LPN........................11/11/2021  11:46 AM     Medication Reconciliation: completed   Shara Baltazar LPN  11/11/2021 11:45 AM

## 2021-11-11 NOTE — PROGRESS NOTES
Return OB Visit    S: Ms. Garcia is feeling well today. She has no acute concerns. Denies leaking of fluid, vaginal bleeding, painful contractions. Notes fetal movements.    O: /64 (BP Location: Right arm, Patient Position: Sitting, Cuff Size: Adult Regular)   Pulse 92   Wt 63 kg (139 lb)   LMP 2021 (Exact Date)   Breastfeeding No   BMI 24.05 kg/m    Gen: Well-appearing, NAD  See OB Flowsheet    FH 30 cm   bpm    Assessment:  Ms. Priscilla Garcia is a 30 year old yo  here for OB follow up. She is currently 30w3d. Her pregnancy is complicated by maternal smoking and marijuana use, chlamydia in first trimester.     Plan:  # Routine Prenatal Care  -- Datinw3d US CARMITA: 2022  -- PNLs:               O Neg, Ab screen neg              RPR nr              Hep B S Ag neg              Hep C neg              Rubella immune              HIV neg             +Chlamydia s/p treatment at first OB visit              - Gonorrhea              Pap: NIL, HPV negative     -- Genetic Screening: Discussed with patient, politely declined  -- Anatomy US:  58%ile, CL 4.2 cm anterior placenta  -- Immunizations: Tdap 10/13, Rhogam 10/27  -- 3rd TM labs including CBC, RPR: Hgb 12.6, RPR nr  -- 1 hr GTT: 140   3 hr GTT: 90, 109, 68, 54  -- GBS: Planned for 36 weeks  -- Postpartum Planning: To be discussed   -- Delivery Planning: No indication for early IOL at this time. To be discussed continually  -- Return to clinic in 4 weeks for OB follow up visit  -- Planning to do at next visit: GC/Chlam retest     # Chlamydia in first trimester  -- s/p treatment, normal test of cure   -- Plan for test of reinfection at approx 32 weeks     # O negative blood type  -- Rhogam 10/27     # Maternal smoking  -- Currently smokes 3-4 cigarettes daily  -- Smoking cessation discussed and recommended : noted increased risk IUGR, oligohydramnios, preE     # Maternal marijuana use  -- Notes she uses for anxiety  -- Discussed  alternatives for anxiety and increased risk of placental complications similiarly to smoking in pregnancy  -- Offered to potentially start selective serotonin reuptake inhibitor if she feels her anxiety is unable to be controlled with lifestyle measures, she politely declines        Mindi Linda MD  OB/GYN  11/11/2021 12:28 PM

## 2021-11-24 ENCOUNTER — PRENATAL OFFICE VISIT (OUTPATIENT)
Dept: OBGYN | Facility: OTHER | Age: 31
End: 2021-11-24
Attending: STUDENT IN AN ORGANIZED HEALTH CARE EDUCATION/TRAINING PROGRAM
Payer: COMMERCIAL

## 2021-11-24 VITALS
BODY MASS INDEX: 24.24 KG/M2 | DIASTOLIC BLOOD PRESSURE: 62 MMHG | SYSTOLIC BLOOD PRESSURE: 110 MMHG | WEIGHT: 140.1 LBS | HEART RATE: 102 BPM

## 2021-11-24 DIAGNOSIS — O26.899 RH NEGATIVE STATE IN ANTEPARTUM PERIOD: ICD-10-CM

## 2021-11-24 DIAGNOSIS — R30.9 URINARY PAIN: Primary | ICD-10-CM

## 2021-11-24 DIAGNOSIS — Z67.91 RH NEGATIVE STATE IN ANTEPARTUM PERIOD: ICD-10-CM

## 2021-11-24 DIAGNOSIS — K21.00 GASTROESOPHAGEAL REFLUX DISEASE WITH ESOPHAGITIS WITHOUT HEMORRHAGE: ICD-10-CM

## 2021-11-24 LAB
ALBUMIN UR-MCNC: NEGATIVE MG/DL
APPEARANCE UR: CLEAR
BACTERIA #/AREA URNS HPF: ABNORMAL /HPF
BILIRUB UR QL STRIP: NEGATIVE
C TRACH DNA SPEC QL PROBE+SIG AMP: NEGATIVE
COLOR UR AUTO: YELLOW
GLUCOSE UR STRIP-MCNC: NEGATIVE MG/DL
HGB UR QL STRIP: NEGATIVE
KETONES UR STRIP-MCNC: NEGATIVE MG/DL
LEUKOCYTE ESTERASE UR QL STRIP: ABNORMAL
MUCOUS THREADS #/AREA URNS LPF: PRESENT /LPF
N GONORRHOEA DNA SPEC QL NAA+PROBE: NEGATIVE
NITRATE UR QL: NEGATIVE
PH UR STRIP: 6.5 [PH] (ref 5–9)
RBC URINE: 2 /HPF
SP GR UR STRIP: 1 (ref 1–1.03)
SQUAMOUS EPITHELIAL: 1 /HPF
UROBILINOGEN UR STRIP-MCNC: NORMAL MG/DL
WBC URINE: 2 /HPF

## 2021-11-24 PROCEDURE — 87086 URINE CULTURE/COLONY COUNT: CPT | Mod: ZL | Performed by: STUDENT IN AN ORGANIZED HEALTH CARE EDUCATION/TRAINING PROGRAM

## 2021-11-24 PROCEDURE — 99207 PR OB VISIT-NO CHARGE - GICH ONLY: CPT | Performed by: STUDENT IN AN ORGANIZED HEALTH CARE EDUCATION/TRAINING PROGRAM

## 2021-11-24 PROCEDURE — 87491 CHLMYD TRACH DNA AMP PROBE: CPT | Mod: ZL | Performed by: STUDENT IN AN ORGANIZED HEALTH CARE EDUCATION/TRAINING PROGRAM

## 2021-11-24 PROCEDURE — 81003 URINALYSIS AUTO W/O SCOPE: CPT | Mod: ZL | Performed by: STUDENT IN AN ORGANIZED HEALTH CARE EDUCATION/TRAINING PROGRAM

## 2021-11-24 NOTE — PROGRESS NOTES
Return OB Visit    S: Ms. Garcia is feeling well today. She has no acute concerns. Denies leaking of fluid, vaginal bleeding, painful contractions. Notes fetal movements.    O:   /62   Pulse 102   Wt 63.5 kg (140 lb 1.6 oz)   LMP 2021 (Exact Date)   BMI 24.24 kg/m      Gen: Well-appearing, NAD  See OB Flowsheet    FH 31 cm   bpm    Assessment:  Ms. Priscilla Garcia is a 30 year old yo  here for OB follow up. She is currently 32w2d. Her pregnancy is complicated by maternal smoking and marijuana use, chlamydia in first trimester.       Plan:  # Routine Prenatal Care  -- Datinw3d US CARMITA: 2022  -- PNLs:               O Neg, Ab screen neg              RPR nr              Hep B S Ag neg              Hep C neg              Rubella immune              HIV neg             +Chlamydia s/p treatment at first OB visit              - Gonorrhea              Pap: NIL, HPV negative     -- Genetic Screening: Discussed with patient, politely declined  -- Anatomy US:  58%ile, CL 4.2 cm anterior placenta  -- Immunizations: Tdap 10/13, Rhogam 10/27  -- 3rd TM labs including CBC, RPR: Hgb 12.6, RPR nr  -- 1 hr GTT: 140               3 hr GTT: 90, 109, 68, 54  -- GBS: Planned for 36 weeks  -- Postpartum Planning: To be discussed   -- Delivery Planning: No indication for early IOL at this time. To be discussed continually  -- Return to clinic in 2 weeks for OB follow up visit     # Chlamydia in first trimester  -- s/p treatment, normal test of cure   -- Plan for test of reinfection at approx 32 weeks: collected today     # O negative blood type  -- Rhogam 10/27     # Maternal smoking  -- Currently smokes 3-4 cigarettes daily  -- Smoking cessation discussed and recommended : noted increased risk IUGR, oligohydramnios, preE     # Maternal marijuana use  -- Notes she uses for anxiety  -- Discussed alternatives for anxiety and increased risk of placental complications similiarly to smoking in  pregnancy  -- Offered to potentially start selective serotonin reuptake inhibitor if she feels her anxiety is unable to be controlled with lifestyle measures, she politely declines

## 2021-11-24 NOTE — NURSING NOTE
Problem: Alteration in Thoughts and Perception  Goal: Treatment Goal: Gain control of psychotic behaviors/thinking, reduce/eliminate presenting symptoms and demonstrate improved reality functioning upon discharge  Outcome: Progressing  Goal: Verbalize thoughts and feelings  Description  Interventions:  - Promote a nonjudgmental and trusting relationship with the patient through active listening and therapeutic communication  - Assess patient's level of functioning, behavior and potential for risk  - Engage patient in 1 on 1 interactions  - Encourage patient to express fears, feelings, frustrations, and discuss symptoms    - [place] patient to reality, help patient recognize reality-based thinking   - Administer medications as ordered and assess for potential side effects  - Provide the patient education related to the signs and symptoms of the illness and desired effects of prescribed medications  Outcome: Progressing  Goal: Refrain from acting on delusional thinking/internal stimuli  Description  Interventions:  - Monitor patient closely, per order   - Utilize least restrictive measures   - Set reasonable limits, give positive feedback for acceptable   - Administer medications as ordered and monitor of potential side effects  Outcome: Progressing  Goal: Agree to be compliant with medication regime, as prescribed and report medication side effects  Description  Interventions:  - Offer appropriate PRN medication and supervise ingestion; conduct AIMS, as needed   Outcome: Progressing  Goal: Attend and participate in unit activities, including therapeutic, recreational, and educational groups  Description  Interventions:  -Encourage Visitation and family involvement in care  Outcome: Progressing  Goal: Recognize dysfunctional thoughts, communicate reality-based thoughts at the time of discharge  Description  Interventions:  - Provide medication and psycho-education to assist patient in compliance and developing Pt presents to clinic today for prenatal care 32w2d. Pt denies any contractions, bleeding, or leakage of fluid at this time. States states baby is moving good.      Medication Reconciliation: complete  Paula James LPN     insight into his/her illness   Outcome: Progressing     Problem: DISCHARGE PLANNING  Goal: Discharge to home or other facility with appropriate resources  Description  INTERVENTIONS:  - Identify barriers to discharge w/patient and caregiver  - Arrange for needed discharge resources and transportation as appropriate  - Identify discharge learning needs (meds, wound care, etc )  - Arrange for interpretive services to assist at discharge as needed  - Refer to Case Management Department for coordinating discharge planning if the patient needs post-hospital services based on physician/advanced practitioner order or complex needs related to functional status, cognitive ability, or social support system  Outcome: Progressing     Problem: Risk for Self Injury/Neglect  Goal: Treatment Goal: Remain safe during length of stay, learn and adopt new coping skills, and be free of self-injurious ideation, impulses and acts at the time of discharge  Outcome: Progressing     Problem: Ineffective Coping  Goal: Participates in unit activities  Description  Interventions:  - Provide therapeutic environment   - Provide required programming   - Redirect inappropriate behaviors   Outcome: Progressing

## 2021-11-26 LAB — BACTERIA UR CULT: NO GROWTH

## 2021-12-06 ENCOUNTER — PRENATAL OFFICE VISIT (OUTPATIENT)
Dept: OBGYN | Facility: OTHER | Age: 31
End: 2021-12-06
Attending: OBSTETRICS & GYNECOLOGY
Payer: COMMERCIAL

## 2021-12-06 ENCOUNTER — NURSE TRIAGE (OUTPATIENT)
Dept: OBGYN | Facility: OTHER | Age: 31
End: 2021-12-06
Payer: COMMERCIAL

## 2021-12-06 VITALS
SYSTOLIC BLOOD PRESSURE: 110 MMHG | HEART RATE: 75 BPM | DIASTOLIC BLOOD PRESSURE: 68 MMHG | BODY MASS INDEX: 24.22 KG/M2 | WEIGHT: 140 LBS

## 2021-12-06 DIAGNOSIS — R10.11 RUQ ABDOMINAL PAIN: Primary | ICD-10-CM

## 2021-12-06 LAB
ALBUMIN MFR UR ELPH: 11 MG/DL (ref 1–14)
ALBUMIN SERPL-MCNC: 3.7 G/DL (ref 3.5–5.7)
ALBUMIN UR-MCNC: NEGATIVE MG/DL
ALP SERPL-CCNC: 115 U/L (ref 34–104)
ALT SERPL W P-5'-P-CCNC: 8 U/L (ref 7–52)
APPEARANCE UR: CLEAR
AST SERPL W P-5'-P-CCNC: 14 U/L (ref 13–39)
BILIRUB DIRECT SERPL-MCNC: <0.1 MG/DL (ref 0–0.2)
BILIRUB SERPL-MCNC: 0.3 MG/DL (ref 0.3–1)
BILIRUB UR QL STRIP: NEGATIVE
COLOR UR AUTO: NORMAL
CREAT UR-MCNC: 49 MG/DL
ERYTHROCYTE [DISTWIDTH] IN BLOOD BY AUTOMATED COUNT: 13.3 % (ref 10–15)
GLUCOSE UR STRIP-MCNC: NEGATIVE MG/DL
HCT VFR BLD AUTO: 34 % (ref 35–47)
HGB BLD-MCNC: 12 G/DL (ref 11.7–15.7)
HGB UR QL STRIP: NEGATIVE
KETONES UR STRIP-MCNC: NEGATIVE MG/DL
LEUKOCYTE ESTERASE UR QL STRIP: NEGATIVE
MCH RBC QN AUTO: 31.2 PG (ref 26.5–33)
MCHC RBC AUTO-ENTMCNC: 35.3 G/DL (ref 31.5–36.5)
MCV RBC AUTO: 88 FL (ref 78–100)
NITRATE UR QL: NEGATIVE
PH UR STRIP: 6.5 [PH] (ref 5–9)
PLATELET # BLD AUTO: 207 10E3/UL (ref 150–450)
PROT SERPL-MCNC: 6.4 G/DL (ref 6.4–8.9)
PROT/CREAT 24H UR: 0.22 MG/MG CR
RBC # BLD AUTO: 3.85 10E6/UL (ref 3.8–5.2)
SP GR UR STRIP: 1.01 (ref 1–1.03)
UROBILINOGEN UR STRIP-MCNC: NORMAL MG/DL
WBC # BLD AUTO: 14.6 10E3/UL (ref 4–11)

## 2021-12-06 PROCEDURE — 81003 URINALYSIS AUTO W/O SCOPE: CPT | Mod: ZL | Performed by: STUDENT IN AN ORGANIZED HEALTH CARE EDUCATION/TRAINING PROGRAM

## 2021-12-06 PROCEDURE — 84156 ASSAY OF PROTEIN URINE: CPT | Mod: ZL | Performed by: OBSTETRICS & GYNECOLOGY

## 2021-12-06 PROCEDURE — 99207 PR OB VISIT-NO CHARGE - GICH ONLY: CPT | Performed by: OBSTETRICS & GYNECOLOGY

## 2021-12-06 PROCEDURE — 85027 COMPLETE CBC AUTOMATED: CPT | Mod: ZL | Performed by: OBSTETRICS & GYNECOLOGY

## 2021-12-06 PROCEDURE — 80076 HEPATIC FUNCTION PANEL: CPT | Mod: ZL | Performed by: OBSTETRICS & GYNECOLOGY

## 2021-12-06 PROCEDURE — 36415 COLL VENOUS BLD VENIPUNCTURE: CPT | Mod: ZL | Performed by: OBSTETRICS & GYNECOLOGY

## 2021-12-06 ASSESSMENT — PAIN SCALES - GENERAL: PAINLEVEL: MODERATE PAIN (4)

## 2021-12-06 NOTE — TELEPHONE ENCOUNTER
Please call soon.  The patient has pain under her ribs and her back.      Aditi Alfaro on 12/6/2021 at 8:53 AM

## 2021-12-06 NOTE — TELEPHONE ENCOUNTER
Patient calls today stating she has been having increasing RUQ and back pain for a few weeks. She states it is constant under the right side of her ribs and rates 4/10. At times it shoots to her back and is up to a 9/10. She shares that the pain is worse at night and that she has a small discolored area on her abdomen where she most frequently feels the pain. She also notes she has been more fatigued, at times weak and needs to rest for a few minutes. There does not seem to be a pattern orreason for the pain. She had a soft BM this morning and notes she is voiding small amounts frequently. UA on 11/24/21 was negative for infection. Patient denies LOF, VB or decreased FM. She does not note any other concerning symptoms.    Per Dr. Chicas, patient should be seen today for evaluation. Patient agrees and will be seen this afternoon.    Coni Tran RN...................12/6/2021 9:20 AM

## 2021-12-06 NOTE — NURSING NOTE
"Chief Complaint   Patient presents with     Prenatal Care     34 weeks       Initial /68   Pulse 75   Wt 63.5 kg (140 lb)   LMP 03/28/2021 (Exact Date)   BMI 24.22 kg/m   Estimated body mass index is 24.22 kg/m  as calculated from the following:    Height as of 7/1/21: 1.619 m (5' 3.75\").    Weight as of this encounter: 63.5 kg (140 lb).  Medication Reconciliation: complete    FOOD SECURITY SCREENING QUESTIONS  Hunger Vital Signs:  Within the past 12 months we worried whether our food would run out before we got money to buy more. Never  Within the past 12 months the food we bought just didn't last and we didn't have money to get more. Never  Brooklyn Boyle LPN 12/6/2021 12:55 PM              Brooklyn Boyle LPN  "

## 2021-12-06 NOTE — PROGRESS NOTES
CC: Recheck OB visit at 34w0d    HPI: Priscilla Garcia presents for a work in due to concerns of RUQ pain.  She has had a few weeks of the discomfort and was worried about HELLP syndrome after reading about it on-line.  She gets occasional floaters in her vision and some headaches. Pain radiates around the RUQ to her back and is episodic. It feels sometimes sharp and stabbing, sometimes with burning and itching.  Denies blisters or rash.    Patient notices normal fetal movement, denies contractions, vaginal bleeding or leaking of fluid.    OB History    Para Term  AB Living   5 3 3 0 1 3   SAB IAB Ectopic Multiple Live Births   1 0 0 0 3      # Outcome Date GA Lbr Vijay/2nd Weight Sex Delivery Anes PTL Lv   5 Current            4 Term 17 40w5d   F Vag-Spont   MANNIE   3 SAB 16           2 Term 07/28/15 40w4d  3.005 kg (6 lb 10 oz) M Vag-Vacuum None N MANNIE      Complications: Abruptio Placenta      Apgar1: 9  Apgar5: 9   1 Term 13 40w1d  2.92 kg (6 lb 7 oz) F Vag-Spont EPI  MANNIE     Current Outpatient Medications   Medication     docusate sodium (COLACE) 50 MG capsule     famotidine (PEPCID) 20 MG tablet     Prenatal Vit-Fe Sulfate-FA (PRENATAL MULTIVIT-IRON PO)     No current facility-administered medications for this visit.     O: /68   Pulse 75   Wt 63.5 kg (140 lb)   LMP 2021 (Exact Date)   BMI 24.22 kg/m    Body mass index is 24.22 kg/m .  See OB flow sheet  EXAM:  NAD  FH:35 cm  FHT: 140 bpm    Mildly pos RUQ Perla's sign  No peripheral edema  Neurologically intact grossly  Anxious    No results found for any visits on 21.    A/P: (R10.11) RUQ abdominal pain  (primary encounter diagnosis)  Comment:   Plan: CBC W PLT No Diff, Hepatic Function Panel,         Protein  random urine with Creat Ratio, US         Abdomen Limited          Will get labs to r/o preeclampsia, and US to look at her gallbladder.  F/u later this week with Dr. Linda as already  scheduled.    Kyelr Chicas MD FACOG  1:26 PM 12/6/2021

## 2021-12-08 ENCOUNTER — HOSPITAL ENCOUNTER (OUTPATIENT)
Dept: ULTRASOUND IMAGING | Facility: OTHER | Age: 31
Discharge: HOME | End: 2021-12-08
Attending: OBSTETRICS & GYNECOLOGY | Admitting: OBSTETRICS & GYNECOLOGY
Payer: COMMERCIAL

## 2021-12-08 DIAGNOSIS — R10.11 RUQ ABDOMINAL PAIN: ICD-10-CM

## 2021-12-08 PROCEDURE — 76705 ECHO EXAM OF ABDOMEN: CPT

## 2021-12-09 ENCOUNTER — PRENATAL OFFICE VISIT (OUTPATIENT)
Dept: OBGYN | Facility: OTHER | Age: 31
End: 2021-12-09
Attending: STUDENT IN AN ORGANIZED HEALTH CARE EDUCATION/TRAINING PROGRAM
Payer: COMMERCIAL

## 2021-12-09 VITALS
HEART RATE: 96 BPM | WEIGHT: 141.4 LBS | BODY MASS INDEX: 24.46 KG/M2 | SYSTOLIC BLOOD PRESSURE: 118 MMHG | DIASTOLIC BLOOD PRESSURE: 76 MMHG

## 2021-12-09 DIAGNOSIS — Z67.91 RH NEGATIVE STATUS DURING PREGNANCY IN THIRD TRIMESTER: Primary | ICD-10-CM

## 2021-12-09 DIAGNOSIS — O26.843 UTERINE SIZE-DATE DISCREPANCY IN THIRD TRIMESTER: ICD-10-CM

## 2021-12-09 DIAGNOSIS — O26.893 RH NEGATIVE STATUS DURING PREGNANCY IN THIRD TRIMESTER: Primary | ICD-10-CM

## 2021-12-09 PROCEDURE — 99207 PR OB VISIT-NO CHARGE - GICH ONLY: CPT | Performed by: STUDENT IN AN ORGANIZED HEALTH CARE EDUCATION/TRAINING PROGRAM

## 2021-12-09 NOTE — NURSING NOTE
Pt presents to clinic today for prenatal care 34w3d. Pt denies any  bleeding, or leakage of fluid at this time. States baby is moving good and has noticed some contractions. Continues to have the abd pain.     Medication Reconciliation: complete  Paula James LPN

## 2021-12-09 NOTE — PROGRESS NOTES
Return OB Visit    S: Ms. Garcia is feeling well today. She has no acute concerns. Denies leaking of fluid, vaginal bleeding, painful contractions. Notes fetal movements.    O:   /76   Pulse 96   Wt 64.1 kg (141 lb 6.4 oz)   LMP 2021 (Exact Date)   BMI 24.46 kg/m      Gen: Well-appearing, NAD  FH 31 cm   bpm    Assessment:  Ms. Priscilla Garcia is a 30 year old yo  here for OB follow up. She is currently 34w3d. Her pregnancy is complicated by maternal smoking and marijuana use, chlamydia in first trimester s/p treatment.        Plan:  # Routine Prenatal Care  -- Datinw3d US CARMITA: 2022  -- PNLs:               O Neg, Ab screen neg              RPR nr              Hep B S Ag neg              Hep C neg              Rubella immune              HIV neg             +Chlamydia s/p treatment at first OB visit              - Gonorrhea              Pap: NIL, HPV negative     -- Genetic Screening: Discussed with patient, politely declined  -- Anatomy US:  58%ile, CL 4.2 cm anterior placenta  -- Immunizations: Tdap 10/13, Rhogam 10/27  -- 3rd TM labs including CBC, RPR: Hgb 12.6, RPR nr  -- 1 hr GTT: 140               3 hr GTT: 90, 109, 68, 54  -- GBS: Planned for 36 weeks  -- Postpartum Planning: Plans to both breast and bottle feed with pumping, Plans on DepoProvera until vasectomy   -- Delivery Planning: No indication for early IOL at this time. To be discussed continually  -- Return to clinic in 2 weeks for OB follow up visit  -- Next visit: GBS swab     # S<D  -- Growth US ordered today  31 cm at 34 weeks    # Chlamydia in first trimester  -- s/p treatment, normal test of cure , negative test of reinfection      # O negative blood type  -- Rhogam 10/27     # Maternal smoking  -- Currently smokes 3-4 cigarettes daily  -- Smoking cessation discussed and recommended : noted increased risk IUGR, oligohydramnios, preE     # Maternal marijuana use  -- Notes she uses for  anxiety  -- Discussed alternatives for anxiety and increased risk of placental complications similiarly to smoking in pregnancy  -- Offered to potentially start selective serotonin reuptake inhibitor if she feels her anxiety is unable to be controlled with lifestyle measures, she politely declines    Mindi Linda MD  OB/GYN  12/8/2021 7:48 PM

## 2021-12-13 ENCOUNTER — HOSPITAL ENCOUNTER (OUTPATIENT)
Dept: ULTRASOUND IMAGING | Facility: OTHER | Age: 31
Discharge: HOME | End: 2021-12-13
Attending: STUDENT IN AN ORGANIZED HEALTH CARE EDUCATION/TRAINING PROGRAM | Admitting: STUDENT IN AN ORGANIZED HEALTH CARE EDUCATION/TRAINING PROGRAM
Payer: COMMERCIAL

## 2021-12-13 DIAGNOSIS — Z67.91 RH NEGATIVE STATUS DURING PREGNANCY IN THIRD TRIMESTER: ICD-10-CM

## 2021-12-13 DIAGNOSIS — O26.893 RH NEGATIVE STATUS DURING PREGNANCY IN THIRD TRIMESTER: ICD-10-CM

## 2021-12-13 DIAGNOSIS — O26.843 UTERINE SIZE-DATE DISCREPANCY IN THIRD TRIMESTER: ICD-10-CM

## 2021-12-13 PROCEDURE — 76816 OB US FOLLOW-UP PER FETUS: CPT

## 2021-12-15 ENCOUNTER — NURSE TRIAGE (OUTPATIENT)
Dept: OBGYN | Facility: OTHER | Age: 31
End: 2021-12-15
Payer: COMMERCIAL

## 2021-12-15 NOTE — TELEPHONE ENCOUNTER
Patient calls today to report some increased pain in her hips that radiates to legs, occasional/irregular mild contractions. She also noted a small amount of mucus discharge, without blood this morning. No LOF or vaginal bleeding. Baby is moving normally. She shares that she does have some bladder pain with urination, but only if she is having a contraction at the time. Patient advised to rest, hydrate and monitor at home. She is to call or present to hospital if symptoms worsen or persist.    Coni Tran RN...................12/15/2021 11:05 AM      Reason for Disposition    Mild contractions > 10 minutes apart    Protocols used: PREGNANCY - LABOR - [id number]

## 2021-12-23 ENCOUNTER — PRENATAL OFFICE VISIT (OUTPATIENT)
Dept: OBGYN | Facility: OTHER | Age: 31
End: 2021-12-23
Attending: STUDENT IN AN ORGANIZED HEALTH CARE EDUCATION/TRAINING PROGRAM
Payer: COMMERCIAL

## 2021-12-23 VITALS
HEART RATE: 102 BPM | SYSTOLIC BLOOD PRESSURE: 120 MMHG | WEIGHT: 145.6 LBS | BODY MASS INDEX: 25.19 KG/M2 | DIASTOLIC BLOOD PRESSURE: 68 MMHG

## 2021-12-23 DIAGNOSIS — O26.893 RH NEGATIVE STATUS DURING PREGNANCY IN THIRD TRIMESTER: ICD-10-CM

## 2021-12-23 DIAGNOSIS — Z34.93 ENCOUNTER FOR PREGNANCY RELATED EXAMINATION IN THIRD TRIMESTER: Primary | ICD-10-CM

## 2021-12-23 DIAGNOSIS — Z67.91 RH NEGATIVE STATUS DURING PREGNANCY IN THIRD TRIMESTER: ICD-10-CM

## 2021-12-23 PROCEDURE — 87081 CULTURE SCREEN ONLY: CPT | Mod: ZL | Performed by: STUDENT IN AN ORGANIZED HEALTH CARE EDUCATION/TRAINING PROGRAM

## 2021-12-23 PROCEDURE — 99207 PR OB VISIT-NO CHARGE - GICH ONLY: CPT | Performed by: STUDENT IN AN ORGANIZED HEALTH CARE EDUCATION/TRAINING PROGRAM

## 2021-12-23 NOTE — PROGRESS NOTES
Return OB Visit    S: Ms. Garcia is feeling well today. She has no acute concerns. Denies leaking of fluid, vaginal bleeding, painful contractions. Notes fetal movements.    O: LMP 2021 (Exact Date)   Gen: Well-appearing, NAD    FH 35 cm   bpm    Assessment:  Ms. Priscilla Garcia is a 30 year old yo  here for OB follow up. She is currently 36w3d. Her pregnancy is complicated by maternal smoking and marijuana use, chlamydia in first trimester s/p treatment.        Plan:  # Routine Prenatal Care  -- Datinw3d US CARMITA: 2022  -- PNLs:               O Neg, Ab screen neg              RPR nr              Hep B S Ag neg              Hep C neg              Rubella immune              HIV neg             +Chlamydia s/p treatment at first OB visit              - Gonorrhea              Pap: NIL, HPV negative     -- Genetic Screening: Discussed with patient, politely declined  -- Anatomy US:  58%ile, CL 4.2 cm anterior placenta   BPD 3%ile, HC 5%ile-- MFM Follow-up US and recommendations for no changes to medical management.  -- Immunizations: Tdap 10/13, Rhogam 10/27  -- 3rd TM labs including CBC, RPR: Hgb 12.6, RPR nr  -- 1 hr GTT: 140               3 hr GTT: 90, 109, 68, 54  -- GBS: Today  -- Postpartum Planning: Plans to both breast and bottle feed with pumping, Plans on DepoProvera until vasectomy   -- Delivery Planning: No indication for early IOL at this time. To be discussed continually  -- Return to clinic in 1 week for OB follow up visit  -- Next visit: Follow up GBS results     # S<D  -- Growth US ordered today  31 cm at 34 weeks  -- : 2414 grams, 29%ile, AC 61%ile    # Chlamydia in first trimester  -- s/p treatment, normal test of cure , negative test of reinfection      # O negative blood type  -- Rhogam 10/27     # Maternal smoking  -- Currently smokes 3-4 cigarettes daily  -- Smoking cessation discussed and recommended : noted increased risk IUGR, oligohydramnios,  preE     # Maternal marijuana use  -- Notes she uses for anxiety  -- Discussed alternatives for anxiety and increased risk of placental complications similiarly to smoking in pregnancy  -- Offered to potentially start selective serotonin reuptake inhibitor if she feels her anxiety is unable to be controlled with lifestyle measures, she politely declines       Mindi Linda MD  OB/GYN  12/23/2021 12:10 PM

## 2021-12-23 NOTE — NURSING NOTE
Pt presents to clinic today for prenatal care 36w3d. States baby is moving good and has increased lower back pain. Has been using a heating pad with no relief. Pt denies any bleeding, or leakage of fluid at this time.    Medication Reconciliation: complete  Paula James LPN

## 2021-12-25 LAB — BACTERIA SPEC CULT: NORMAL

## 2021-12-29 ENCOUNTER — PRENATAL OFFICE VISIT (OUTPATIENT)
Dept: OBGYN | Facility: OTHER | Age: 31
End: 2021-12-29
Attending: OBSTETRICS & GYNECOLOGY
Payer: COMMERCIAL

## 2021-12-29 VITALS
SYSTOLIC BLOOD PRESSURE: 120 MMHG | DIASTOLIC BLOOD PRESSURE: 80 MMHG | BODY MASS INDEX: 25.29 KG/M2 | HEART RATE: 106 BPM | WEIGHT: 146.2 LBS

## 2021-12-29 DIAGNOSIS — Z34.83 ENCOUNTER FOR SUPERVISION OF OTHER NORMAL PREGNANCY, THIRD TRIMESTER: Primary | ICD-10-CM

## 2021-12-29 PROCEDURE — 99207 PR OB VISIT-NO CHARGE - GICH ONLY: CPT | Performed by: OBSTETRICS & GYNECOLOGY

## 2021-12-29 NOTE — PROGRESS NOTES
Return OB Visit    S: Patient is feeling well. Uncomfortable. BH ctx, no VB or LOF. +FM    O: /80   Pulse 106   Wt 66.3 kg (146 lb 3.2 oz)   LMP 2021 (Exact Date)   BMI 25.29 kg/m    Gen: Well-appearing, NAD  See OB Flowsheet    A/P:  Priscilla Garcia is a 31 year old  at 37w2d by 11w3d US, here for return OB visit.  Tobacco, THC use  Chlamydia in first trimester, s/p treatment, negative DALE  Plans breast and bottle feeding, Depo until vasectomy    PNC: Rh negative, Rubella immune, , passed GTT, GBS negative  Genetics: declined  Imaging: dating US at 11w3d, normal anatomy except microcephaly s/p MFM US, no changes to management  Immunizations: s/p Tdap  RTC weekly    Giovanna Cook MD FACOG  OB/GYN  2021 1:22 PM

## 2021-12-29 NOTE — NURSING NOTE
Pt presents to clinic today for prenatal care 37w2d. Pt denies any contractions, bleeding, or leakage of fluid at this time. States baby is moving good.      Medication Reconciliation: complete  Paula James LPN

## 2022-01-06 ENCOUNTER — PRENATAL OFFICE VISIT (OUTPATIENT)
Dept: OBGYN | Facility: OTHER | Age: 32
End: 2022-01-06
Attending: STUDENT IN AN ORGANIZED HEALTH CARE EDUCATION/TRAINING PROGRAM
Payer: COMMERCIAL

## 2022-01-06 VITALS
HEIGHT: 64 IN | HEART RATE: 94 BPM | DIASTOLIC BLOOD PRESSURE: 64 MMHG | BODY MASS INDEX: 25.32 KG/M2 | RESPIRATION RATE: 16 BRPM | WEIGHT: 148.3 LBS | OXYGEN SATURATION: 97 % | SYSTOLIC BLOOD PRESSURE: 110 MMHG

## 2022-01-06 DIAGNOSIS — Z34.83 ENCOUNTER FOR SUPERVISION OF OTHER NORMAL PREGNANCY, THIRD TRIMESTER: Primary | ICD-10-CM

## 2022-01-06 DIAGNOSIS — K21.00 GASTROESOPHAGEAL REFLUX DISEASE WITH ESOPHAGITIS WITHOUT HEMORRHAGE: ICD-10-CM

## 2022-01-06 PROCEDURE — 99207 PR OB VISIT-NO CHARGE - GICH ONLY: CPT | Performed by: OBSTETRICS & GYNECOLOGY

## 2022-01-06 RX ORDER — FAMOTIDINE 20 MG/1
20 TABLET, FILM COATED ORAL 2 TIMES DAILY PRN
Qty: 60 TABLET | Refills: 0 | Status: SHIPPED | OUTPATIENT
Start: 2022-01-06 | End: 2023-11-27

## 2022-01-06 ASSESSMENT — MIFFLIN-ST. JEOR: SCORE: 1372.68

## 2022-01-06 NOTE — PROGRESS NOTES
"Patient here for routine prenatal check. 38w 3d. Has some increase in heartburn and needs refill of her medication. Contractions not consistent but states they are more painful and continues to have discharge. No leaking of fluids or bleeding.     Chief Complaint   Patient presents with     Prenatal Care     38w 3d       Initial /64 (BP Location: Right arm, Patient Position: Sitting, Cuff Size: Adult Regular)   Pulse 94   Resp 16   Ht 1.626 m (5' 4\")   Wt 67.3 kg (148 lb 4.8 oz)   LMP 03/28/2021 (Exact Date)   SpO2 97%   Breastfeeding No   BMI 25.46 kg/m   Estimated body mass index is 25.46 kg/m  as calculated from the following:    Height as of this encounter: 1.626 m (5' 4\").    Weight as of this encounter: 67.3 kg (148 lb 4.8 oz).  Medication Reconciliation: complete    Maryann Cook RN  "

## 2022-01-06 NOTE — PROGRESS NOTES
"Return OB Visit    S: Patient is ready to be done. Ctx are getting more painful but still irregular. No VB or LOF. +FM. Needs pepcid refilled.    O: /64 (BP Location: Right arm, Patient Position: Sitting, Cuff Size: Adult Regular)   Pulse 94   Resp 16   Ht 1.626 m (5' 4\")   Wt 67.3 kg (148 lb 4.8 oz)   LMP 2021 (Exact Date)   SpO2 97%   Breastfeeding No   BMI 25.46 kg/m    Gen: Well-appearing, NAD  See OB Flowsheet    A/P:  Priscilla Garcia is a 31 year old  at 38w3d by 11w3d US, here for return OB visit.  Tobacco, THC use  Chlamydia in first trimester, s/p treatment, negative DALE  Plans breast and bottle feeding, Depo until vasectomy     PNC: Rh negative, Rubella immune, , passed GTT, GBS negative  Genetics: declined  Imaging: dating US at 11w3d, normal anatomy except microcephaly s/p MFM US, no changes to management  Immunizations: s/p Tdap  RTC weekly    Giovanna Cook MD FACOG  OB/GYN  2022 11:40 AM     "

## 2022-01-13 ENCOUNTER — PRENATAL OFFICE VISIT (OUTPATIENT)
Dept: OBGYN | Facility: OTHER | Age: 32
End: 2022-01-13
Attending: STUDENT IN AN ORGANIZED HEALTH CARE EDUCATION/TRAINING PROGRAM
Payer: COMMERCIAL

## 2022-01-13 VITALS
RESPIRATION RATE: 18 BRPM | BODY MASS INDEX: 25.28 KG/M2 | SYSTOLIC BLOOD PRESSURE: 110 MMHG | DIASTOLIC BLOOD PRESSURE: 70 MMHG | HEART RATE: 93 BPM | WEIGHT: 147.3 LBS | OXYGEN SATURATION: 98 %

## 2022-01-13 DIAGNOSIS — Z67.91 RH NEGATIVE STATUS DURING PREGNANCY IN THIRD TRIMESTER: Primary | ICD-10-CM

## 2022-01-13 DIAGNOSIS — O26.893 RH NEGATIVE STATUS DURING PREGNANCY IN THIRD TRIMESTER: Primary | ICD-10-CM

## 2022-01-13 PROCEDURE — 99207 PR OB VISIT-NO CHARGE - GICH ONLY: CPT | Performed by: STUDENT IN AN ORGANIZED HEALTH CARE EDUCATION/TRAINING PROGRAM

## 2022-01-13 RX ORDER — NALOXONE HYDROCHLORIDE 0.4 MG/ML
0.2 INJECTION, SOLUTION INTRAMUSCULAR; INTRAVENOUS; SUBCUTANEOUS
Status: CANCELLED | OUTPATIENT
Start: 2022-01-13

## 2022-01-13 RX ORDER — OXYTOCIN/0.9 % SODIUM CHLORIDE 30/500 ML
100-340 PLASTIC BAG, INJECTION (ML) INTRAVENOUS CONTINUOUS PRN
Status: CANCELLED | OUTPATIENT
Start: 2022-01-13

## 2022-01-13 RX ORDER — ONDANSETRON 2 MG/ML
4 INJECTION INTRAMUSCULAR; INTRAVENOUS EVERY 6 HOURS PRN
Status: CANCELLED | OUTPATIENT
Start: 2022-01-13

## 2022-01-13 RX ORDER — OXYTOCIN/0.9 % SODIUM CHLORIDE 30/500 ML
340 PLASTIC BAG, INJECTION (ML) INTRAVENOUS CONTINUOUS PRN
Status: CANCELLED | OUTPATIENT
Start: 2022-01-13

## 2022-01-13 RX ORDER — TRANEXAMIC ACID 10 MG/ML
1 INJECTION, SOLUTION INTRAVENOUS EVERY 30 MIN PRN
Status: CANCELLED | OUTPATIENT
Start: 2022-01-13

## 2022-01-13 RX ORDER — FENTANYL CITRATE 50 UG/ML
50-100 INJECTION, SOLUTION INTRAMUSCULAR; INTRAVENOUS
Status: CANCELLED | OUTPATIENT
Start: 2022-01-13

## 2022-01-13 RX ORDER — ONDANSETRON 4 MG/1
4 TABLET, ORALLY DISINTEGRATING ORAL EVERY 6 HOURS PRN
Status: CANCELLED | OUTPATIENT
Start: 2022-01-13

## 2022-01-13 RX ORDER — CARBOPROST TROMETHAMINE 250 UG/ML
250 INJECTION, SOLUTION INTRAMUSCULAR
Status: CANCELLED | OUTPATIENT
Start: 2022-01-13

## 2022-01-13 RX ORDER — PROCHLORPERAZINE MALEATE 10 MG
10 TABLET ORAL EVERY 6 HOURS PRN
Status: CANCELLED | OUTPATIENT
Start: 2022-01-13

## 2022-01-13 RX ORDER — NALOXONE HYDROCHLORIDE 0.4 MG/ML
0.4 INJECTION, SOLUTION INTRAMUSCULAR; INTRAVENOUS; SUBCUTANEOUS
Status: CANCELLED | OUTPATIENT
Start: 2022-01-13

## 2022-01-13 RX ORDER — LIDOCAINE 40 MG/G
CREAM TOPICAL
Status: CANCELLED | OUTPATIENT
Start: 2022-01-13

## 2022-01-13 RX ORDER — PROCHLORPERAZINE 25 MG
25 SUPPOSITORY, RECTAL RECTAL EVERY 12 HOURS PRN
Status: CANCELLED | OUTPATIENT
Start: 2022-01-13

## 2022-01-13 RX ORDER — SODIUM CHLORIDE, SODIUM LACTATE, POTASSIUM CHLORIDE, CALCIUM CHLORIDE 600; 310; 30; 20 MG/100ML; MG/100ML; MG/100ML; MG/100ML
INJECTION, SOLUTION INTRAVENOUS CONTINUOUS PRN
Status: CANCELLED | OUTPATIENT
Start: 2022-01-13

## 2022-01-13 RX ORDER — METOCLOPRAMIDE 10 MG/1
10 TABLET ORAL EVERY 6 HOURS PRN
Status: CANCELLED | OUTPATIENT
Start: 2022-01-13

## 2022-01-13 RX ORDER — METHYLERGONOVINE MALEATE 0.2 MG/ML
200 INJECTION INTRAVENOUS
Status: CANCELLED | OUTPATIENT
Start: 2022-01-13

## 2022-01-13 RX ORDER — OXYTOCIN/0.9 % SODIUM CHLORIDE 30/500 ML
1-24 PLASTIC BAG, INJECTION (ML) INTRAVENOUS CONTINUOUS
Status: CANCELLED | OUTPATIENT
Start: 2022-01-13

## 2022-01-13 RX ORDER — MISOPROSTOL 100 UG/1
400 TABLET ORAL
Status: CANCELLED | OUTPATIENT
Start: 2022-01-13

## 2022-01-13 RX ORDER — METOCLOPRAMIDE HYDROCHLORIDE 5 MG/ML
10 INJECTION INTRAMUSCULAR; INTRAVENOUS EVERY 6 HOURS PRN
Status: CANCELLED | OUTPATIENT
Start: 2022-01-13

## 2022-01-13 RX ORDER — OXYTOCIN 10 [USP'U]/ML
10 INJECTION, SOLUTION INTRAMUSCULAR; INTRAVENOUS
Status: CANCELLED | OUTPATIENT
Start: 2022-01-13

## 2022-01-13 ASSESSMENT — PAIN SCALES - GENERAL: PAINLEVEL: MODERATE PAIN (4)

## 2022-01-13 NOTE — PROGRESS NOTES
Return OB Visit    S: Ms. Garcia is feeling well today. She has no acute concerns. Denies leaking of fluid, vaginal bleeding. She had had intermittent episodes of painful contractions but they have not lasted. Notes fetal movements.    O: /70 (BP Location: Right arm, Patient Position: Sitting, Cuff Size: Adult Regular)   Pulse 93   Resp 18   Wt 66.8 kg (147 lb 4.8 oz)   LMP 2021 (Exact Date)   SpO2 98%   Breastfeeding No   BMI 25.28 kg/m    Gen: Well-appearing, NAD    FH 38 cm   bpm  SVE: 3/50/-2, soft, posterior  Confirmed cephalic    Assessment:  Ms. Priscilla Garcia is a 30 year old yo  here for OB follow up. She is currently 39w3d. Her pregnancy is complicated by maternal smoking and marijuana use, chlamydia in first trimester s/p treatment.        Plan:  # Routine Prenatal Care  -- Datinw3d US CARMITA: 2022  -- PNLs:               O Neg, Ab screen neg              RPR nr              Hep B S Ag neg              Hep C neg              Rubella immune              HIV neg             +Chlamydia s/p treatment at first OB visit              - Gonorrhea              Pap: NIL, HPV negative     -- Genetic Screening: Discussed with patient, politely declined  -- Anatomy US:  58%ile, CL 4.2 cm anterior placenta               BPD 3%ile, HC 5%ile-- MFM Follow-up US and recommendations for no changes to medical management.  -- Immunizations: Tdap 10/13, Rhogam 10/27  -- 3rd TM labs including CBC, RPR: Hgb 12.6, RPR nr  -- 1 hr GTT: 140               3 hr GTT: 90, 109, 68, 54  -- GBS: negative  -- Postpartum Planning: Plans to both breast and bottle feed with pumping, Plans on DepoProvera until vasectomy   -- Return to L&D for hopeful IOL on  5 am- paperwork pending       # S<D  -- Growth US: : 2414 grams, 29%ile, AC 61%ile     # Chlamydia in first trimester  -- s/p treatment, normal test of cure , negative test of reinfection      # O negative blood type  -- Rhogam  10/27  -- Rhogam will  - will need next dose at  appt if she makes it that far in the pregnancy     # Maternal smoking  -- Currently smokes 3-4 cigarettes daily  -- Smoking cessation discussed and recommended : noted increased risk IUGR, oligohydramnios, preE     # Maternal marijuana use  -- Notes she uses for anxiety  -- Discussed alternatives for anxiety and increased risk of placental complications similiarly to smoking in pregnancy  -- Offered to potentially start selective serotonin reuptake inhibitor if she feels her anxiety is unable to be controlled with lifestyle measures, she politely declines    Mindi Linda MD  OB/GYN  2022 12:04 PM

## 2022-01-17 ENCOUNTER — ANESTHESIA EVENT (OUTPATIENT)
Dept: OBGYN | Facility: OTHER | Age: 32
End: 2022-01-17
Payer: COMMERCIAL

## 2022-01-17 ENCOUNTER — HOSPITAL ENCOUNTER (INPATIENT)
Facility: OTHER | Age: 32
LOS: 1 days | Discharge: HOME | End: 2022-01-18
Attending: STUDENT IN AN ORGANIZED HEALTH CARE EDUCATION/TRAINING PROGRAM | Admitting: STUDENT IN AN ORGANIZED HEALTH CARE EDUCATION/TRAINING PROGRAM
Payer: COMMERCIAL

## 2022-01-17 ENCOUNTER — ANESTHESIA (OUTPATIENT)
Dept: OBGYN | Facility: OTHER | Age: 32
End: 2022-01-17
Payer: COMMERCIAL

## 2022-01-17 DIAGNOSIS — O26.893 RH NEGATIVE STATUS DURING PREGNANCY IN THIRD TRIMESTER: ICD-10-CM

## 2022-01-17 DIAGNOSIS — O26.899 RH NEGATIVE STATE IN ANTEPARTUM PERIOD: Primary | ICD-10-CM

## 2022-01-17 DIAGNOSIS — Z67.91 RH NEGATIVE STATUS DURING PREGNANCY IN THIRD TRIMESTER: ICD-10-CM

## 2022-01-17 DIAGNOSIS — Z37.9 VACUUM-ASSISTED VAGINAL DELIVERY: ICD-10-CM

## 2022-01-17 DIAGNOSIS — Z67.91 RH NEGATIVE STATE IN ANTEPARTUM PERIOD: Primary | ICD-10-CM

## 2022-01-17 LAB
ABO/RH(D): NORMAL
AMPHETAMINES UR QL: NOT DETECTED
ANTIBODY SCREEN: NEGATIVE
BARBITURATES UR QL SCN: NOT DETECTED
BASOPHILS # BLD AUTO: 0.1 10E3/UL (ref 0–0.2)
BASOPHILS NFR BLD AUTO: 1 %
BENZODIAZ UR QL SCN: NOT DETECTED
BUPRENORPHINE UR QL: NOT DETECTED
CANNABINOIDS UR QL: NOT DETECTED
COCAINE UR QL SCN: NOT DETECTED
D-METHAMPHET UR QL: NOT DETECTED
EOSINOPHIL # BLD AUTO: 0.2 10E3/UL (ref 0–0.7)
EOSINOPHIL NFR BLD AUTO: 1 %
ERYTHROCYTE [DISTWIDTH] IN BLOOD BY AUTOMATED COUNT: 13.4 % (ref 10–15)
HCT VFR BLD AUTO: 34.6 % (ref 35–47)
HGB BLD-MCNC: 11.7 G/DL (ref 11.7–15.7)
IMM GRANULOCYTES # BLD: 0.2 10E3/UL
IMM GRANULOCYTES NFR BLD: 2 %
LYMPHOCYTES # BLD AUTO: 1.6 10E3/UL (ref 0.8–5.3)
LYMPHOCYTES NFR BLD AUTO: 13 %
MCH RBC QN AUTO: 29 PG (ref 26.5–33)
MCHC RBC AUTO-ENTMCNC: 33.8 G/DL (ref 31.5–36.5)
MCV RBC AUTO: 86 FL (ref 78–100)
METHADONE UR QL SCN: NOT DETECTED
MONOCYTES # BLD AUTO: 1.2 10E3/UL (ref 0–1.3)
MONOCYTES NFR BLD AUTO: 10 %
NEUTROPHILS # BLD AUTO: 9 10E3/UL (ref 1.6–8.3)
NEUTROPHILS NFR BLD AUTO: 73 %
NRBC # BLD AUTO: 0 10E3/UL
NRBC BLD AUTO-RTO: 0 /100
OPIATES UR QL SCN: NOT DETECTED
OXYCODONE UR QL SCN: NOT DETECTED
PCP UR QL SCN: NOT DETECTED
PLATELET # BLD AUTO: 235 10E3/UL (ref 150–450)
PROPOXYPH UR QL: NOT DETECTED
RBC # BLD AUTO: 4.04 10E6/UL (ref 3.8–5.2)
SARS-COV-2 RNA RESP QL NAA+PROBE: NEGATIVE
SPECIMEN EXPIRATION DATE: NORMAL
TRICYCLICS UR QL SCN: NOT DETECTED
WBC # BLD AUTO: 12.2 10E3/UL (ref 4–11)

## 2022-01-17 PROCEDURE — 250N000009 HC RX 250: Performed by: STUDENT IN AN ORGANIZED HEALTH CARE EDUCATION/TRAINING PROGRAM

## 2022-01-17 PROCEDURE — 86901 BLOOD TYPING SEROLOGIC RH(D): CPT | Performed by: STUDENT IN AN ORGANIZED HEALTH CARE EDUCATION/TRAINING PROGRAM

## 2022-01-17 PROCEDURE — 722N000001 HC LABOR CARE VAGINAL DELIVERY SINGLE

## 2022-01-17 PROCEDURE — G0463 HOSPITAL OUTPT CLINIC VISIT: HCPCS | Mod: 25

## 2022-01-17 PROCEDURE — 85025 COMPLETE CBC W/AUTO DIFF WBC: CPT | Performed by: STUDENT IN AN ORGANIZED HEALTH CARE EDUCATION/TRAINING PROGRAM

## 2022-01-17 PROCEDURE — 80306 DRUG TEST PRSMV INSTRMNT: CPT | Performed by: STUDENT IN AN ORGANIZED HEALTH CARE EDUCATION/TRAINING PROGRAM

## 2022-01-17 PROCEDURE — 36415 COLL VENOUS BLD VENIPUNCTURE: CPT | Performed by: STUDENT IN AN ORGANIZED HEALTH CARE EDUCATION/TRAINING PROGRAM

## 2022-01-17 PROCEDURE — 86780 TREPONEMA PALLIDUM: CPT | Performed by: STUDENT IN AN ORGANIZED HEALTH CARE EDUCATION/TRAINING PROGRAM

## 2022-01-17 PROCEDURE — U0005 INFEC AGEN DETEC AMPLI PROBE: HCPCS | Performed by: STUDENT IN AN ORGANIZED HEALTH CARE EDUCATION/TRAINING PROGRAM

## 2022-01-17 PROCEDURE — 250N000011 HC RX IP 250 OP 636: Performed by: NURSE ANESTHETIST, CERTIFIED REGISTERED

## 2022-01-17 PROCEDURE — 59400 OBSTETRICAL CARE: CPT | Performed by: STUDENT IN AN ORGANIZED HEALTH CARE EDUCATION/TRAINING PROGRAM

## 2022-01-17 PROCEDURE — 258N000003 HC RX IP 258 OP 636: Performed by: STUDENT IN AN ORGANIZED HEALTH CARE EDUCATION/TRAINING PROGRAM

## 2022-01-17 PROCEDURE — 59400 OBSTETRICAL CARE: CPT | Performed by: NURSE ANESTHETIST, CERTIFIED REGISTERED

## 2022-01-17 PROCEDURE — 10907ZC DRAINAGE OF AMNIOTIC FLUID, THERAPEUTIC FROM PRODUCTS OF CONCEPTION, VIA NATURAL OR ARTIFICIAL OPENING: ICD-10-PCS | Performed by: STUDENT IN AN ORGANIZED HEALTH CARE EDUCATION/TRAINING PROGRAM

## 2022-01-17 PROCEDURE — 370N000003 HC ANESTHESIA WARD SERVICE

## 2022-01-17 PROCEDURE — 250N000011 HC RX IP 250 OP 636: Performed by: STUDENT IN AN ORGANIZED HEALTH CARE EDUCATION/TRAINING PROGRAM

## 2022-01-17 PROCEDURE — 250N000009 HC RX 250: Performed by: NURSE ANESTHETIST, CERTIFIED REGISTERED

## 2022-01-17 PROCEDURE — 120N000001 HC R&B MED SURG/OB

## 2022-01-17 RX ORDER — HYDROCORTISONE 2.5 %
CREAM (GRAM) TOPICAL 3 TIMES DAILY PRN
Status: DISCONTINUED | OUTPATIENT
Start: 2022-01-17 | End: 2022-01-19 | Stop reason: HOSPADM

## 2022-01-17 RX ORDER — OXYTOCIN 10 [USP'U]/ML
10 INJECTION, SOLUTION INTRAMUSCULAR; INTRAVENOUS
Status: DISCONTINUED | OUTPATIENT
Start: 2022-01-17 | End: 2022-01-19 | Stop reason: HOSPADM

## 2022-01-17 RX ORDER — MODIFIED LANOLIN
OINTMENT (GRAM) TOPICAL
Status: DISCONTINUED | OUTPATIENT
Start: 2022-01-17 | End: 2022-01-19 | Stop reason: HOSPADM

## 2022-01-17 RX ORDER — NALOXONE HYDROCHLORIDE 0.4 MG/ML
0.2 INJECTION, SOLUTION INTRAMUSCULAR; INTRAVENOUS; SUBCUTANEOUS
Status: DISCONTINUED | OUTPATIENT
Start: 2022-01-17 | End: 2022-01-19 | Stop reason: HOSPADM

## 2022-01-17 RX ORDER — NALOXONE HYDROCHLORIDE 0.4 MG/ML
0.2 INJECTION, SOLUTION INTRAMUSCULAR; INTRAVENOUS; SUBCUTANEOUS
Status: DISCONTINUED | OUTPATIENT
Start: 2022-01-17 | End: 2022-01-17 | Stop reason: HOSPADM

## 2022-01-17 RX ORDER — OXYTOCIN/0.9 % SODIUM CHLORIDE 30/500 ML
1-24 PLASTIC BAG, INJECTION (ML) INTRAVENOUS CONTINUOUS
Status: DISCONTINUED | OUTPATIENT
Start: 2022-01-17 | End: 2022-01-17 | Stop reason: HOSPADM

## 2022-01-17 RX ORDER — NALBUPHINE HYDROCHLORIDE 10 MG/ML
2.5-5 INJECTION, SOLUTION INTRAMUSCULAR; INTRAVENOUS; SUBCUTANEOUS EVERY 6 HOURS PRN
Status: DISCONTINUED | OUTPATIENT
Start: 2022-01-17 | End: 2022-01-17

## 2022-01-17 RX ORDER — OXYTOCIN 10 [USP'U]/ML
10 INJECTION, SOLUTION INTRAMUSCULAR; INTRAVENOUS
Status: DISCONTINUED | OUTPATIENT
Start: 2022-01-17 | End: 2022-01-17 | Stop reason: HOSPADM

## 2022-01-17 RX ORDER — NALOXONE HYDROCHLORIDE 0.4 MG/ML
0.4 INJECTION, SOLUTION INTRAMUSCULAR; INTRAVENOUS; SUBCUTANEOUS
Status: DISCONTINUED | OUTPATIENT
Start: 2022-01-17 | End: 2022-01-19 | Stop reason: HOSPADM

## 2022-01-17 RX ORDER — LIDOCAINE 40 MG/G
CREAM TOPICAL
Status: DISCONTINUED | OUTPATIENT
Start: 2022-01-17 | End: 2022-01-19 | Stop reason: HOSPADM

## 2022-01-17 RX ORDER — METHYLERGONOVINE MALEATE 0.2 MG/ML
200 INJECTION INTRAVENOUS
Status: DISCONTINUED | OUTPATIENT
Start: 2022-01-17 | End: 2022-01-19 | Stop reason: HOSPADM

## 2022-01-17 RX ORDER — IBUPROFEN 600 MG/1
600 TABLET, FILM COATED ORAL
Status: DISCONTINUED | OUTPATIENT
Start: 2022-01-17 | End: 2022-01-17

## 2022-01-17 RX ORDER — TRANEXAMIC ACID 10 MG/ML
1 INJECTION, SOLUTION INTRAVENOUS EVERY 30 MIN PRN
Status: DISCONTINUED | OUTPATIENT
Start: 2022-01-17 | End: 2022-01-19 | Stop reason: HOSPADM

## 2022-01-17 RX ORDER — CARBOPROST TROMETHAMINE 250 UG/ML
250 INJECTION, SOLUTION INTRAMUSCULAR
Status: DISCONTINUED | OUTPATIENT
Start: 2022-01-17 | End: 2022-01-19 | Stop reason: HOSPADM

## 2022-01-17 RX ORDER — PROCHLORPERAZINE MALEATE 10 MG
10 TABLET ORAL EVERY 6 HOURS PRN
Status: DISCONTINUED | OUTPATIENT
Start: 2022-01-17 | End: 2022-01-17 | Stop reason: HOSPADM

## 2022-01-17 RX ORDER — DOCUSATE SODIUM 100 MG/1
100 CAPSULE, LIQUID FILLED ORAL DAILY
Status: DISCONTINUED | OUTPATIENT
Start: 2022-01-18 | End: 2022-01-19 | Stop reason: HOSPADM

## 2022-01-17 RX ORDER — MISOPROSTOL 100 UG/1
400 TABLET ORAL
Status: DISCONTINUED | OUTPATIENT
Start: 2022-01-17 | End: 2022-01-17 | Stop reason: HOSPADM

## 2022-01-17 RX ORDER — OXYTOCIN/0.9 % SODIUM CHLORIDE 30/500 ML
340 PLASTIC BAG, INJECTION (ML) INTRAVENOUS CONTINUOUS PRN
Status: DISCONTINUED | OUTPATIENT
Start: 2022-01-17 | End: 2022-01-19 | Stop reason: HOSPADM

## 2022-01-17 RX ORDER — NALOXONE HYDROCHLORIDE 0.4 MG/ML
0.4 INJECTION, SOLUTION INTRAMUSCULAR; INTRAVENOUS; SUBCUTANEOUS
Status: DISCONTINUED | OUTPATIENT
Start: 2022-01-17 | End: 2022-01-17 | Stop reason: HOSPADM

## 2022-01-17 RX ORDER — FENTANYL CITRATE 50 UG/ML
50-100 INJECTION, SOLUTION INTRAMUSCULAR; INTRAVENOUS
Status: DISCONTINUED | OUTPATIENT
Start: 2022-01-17 | End: 2022-01-19 | Stop reason: HOSPADM

## 2022-01-17 RX ORDER — TRANEXAMIC ACID 10 MG/ML
1 INJECTION, SOLUTION INTRAVENOUS EVERY 30 MIN PRN
Status: DISCONTINUED | OUTPATIENT
Start: 2022-01-17 | End: 2022-01-17 | Stop reason: HOSPADM

## 2022-01-17 RX ORDER — ONDANSETRON 2 MG/ML
4 INJECTION INTRAMUSCULAR; INTRAVENOUS EVERY 6 HOURS PRN
Status: DISCONTINUED | OUTPATIENT
Start: 2022-01-17 | End: 2022-01-19 | Stop reason: HOSPADM

## 2022-01-17 RX ORDER — METOCLOPRAMIDE HYDROCHLORIDE 5 MG/ML
10 INJECTION INTRAMUSCULAR; INTRAVENOUS EVERY 6 HOURS PRN
Status: DISCONTINUED | OUTPATIENT
Start: 2022-01-17 | End: 2022-01-17 | Stop reason: HOSPADM

## 2022-01-17 RX ORDER — BISACODYL 10 MG
10 SUPPOSITORY, RECTAL RECTAL DAILY PRN
Status: DISCONTINUED | OUTPATIENT
Start: 2022-01-17 | End: 2022-01-19 | Stop reason: HOSPADM

## 2022-01-17 RX ORDER — IBUPROFEN 400 MG/1
800 TABLET, FILM COATED ORAL EVERY 6 HOURS PRN
Status: DISCONTINUED | OUTPATIENT
Start: 2022-01-17 | End: 2022-01-19 | Stop reason: HOSPADM

## 2022-01-17 RX ORDER — FENTANYL CITRATE-0.9 % NACL/PF 10 MCG/ML
100 PLASTIC BAG, INJECTION (ML) INTRAVENOUS EVERY 5 MIN PRN
Status: DISCONTINUED | OUTPATIENT
Start: 2022-01-17 | End: 2022-01-17 | Stop reason: HOSPADM

## 2022-01-17 RX ORDER — SODIUM CHLORIDE, SODIUM LACTATE, POTASSIUM CHLORIDE, CALCIUM CHLORIDE 600; 310; 30; 20 MG/100ML; MG/100ML; MG/100ML; MG/100ML
INJECTION, SOLUTION INTRAVENOUS CONTINUOUS
Status: DISCONTINUED | OUTPATIENT
Start: 2022-01-17 | End: 2022-01-17 | Stop reason: HOSPADM

## 2022-01-17 RX ORDER — PROCHLORPERAZINE MALEATE 10 MG
10 TABLET ORAL EVERY 6 HOURS PRN
Status: DISCONTINUED | OUTPATIENT
Start: 2022-01-17 | End: 2022-01-19 | Stop reason: HOSPADM

## 2022-01-17 RX ORDER — OXYTOCIN/0.9 % SODIUM CHLORIDE 30/500 ML
100-340 PLASTIC BAG, INJECTION (ML) INTRAVENOUS CONTINUOUS PRN
Status: DISCONTINUED | OUTPATIENT
Start: 2022-01-17 | End: 2022-01-19 | Stop reason: HOSPADM

## 2022-01-17 RX ORDER — PROCHLORPERAZINE 25 MG
25 SUPPOSITORY, RECTAL RECTAL EVERY 12 HOURS PRN
Status: DISCONTINUED | OUTPATIENT
Start: 2022-01-17 | End: 2022-01-19 | Stop reason: HOSPADM

## 2022-01-17 RX ORDER — ONDANSETRON 2 MG/ML
4 INJECTION INTRAMUSCULAR; INTRAVENOUS EVERY 6 HOURS PRN
Status: DISCONTINUED | OUTPATIENT
Start: 2022-01-17 | End: 2022-01-17 | Stop reason: HOSPADM

## 2022-01-17 RX ORDER — ACETAMINOPHEN 325 MG/1
650 TABLET ORAL EVERY 4 HOURS PRN
Status: DISCONTINUED | OUTPATIENT
Start: 2022-01-17 | End: 2022-01-19 | Stop reason: HOSPADM

## 2022-01-17 RX ORDER — FENTANYL/BUPIVACAINE/NS/PF 2-1250MCG
10 PLASTIC BAG, INJECTION (ML) INJECTION CONTINUOUS
Status: DISCONTINUED | OUTPATIENT
Start: 2022-01-17 | End: 2022-01-17 | Stop reason: HOSPADM

## 2022-01-17 RX ORDER — KETOROLAC TROMETHAMINE 30 MG/ML
30 INJECTION, SOLUTION INTRAMUSCULAR; INTRAVENOUS
Status: DISCONTINUED | OUTPATIENT
Start: 2022-01-17 | End: 2022-01-17

## 2022-01-17 RX ORDER — OXYTOCIN/0.9 % SODIUM CHLORIDE 30/500 ML
100-340 PLASTIC BAG, INJECTION (ML) INTRAVENOUS CONTINUOUS PRN
Status: DISCONTINUED | OUTPATIENT
Start: 2022-01-17 | End: 2022-01-17

## 2022-01-17 RX ORDER — OXYTOCIN 10 [USP'U]/ML
10 INJECTION, SOLUTION INTRAMUSCULAR; INTRAVENOUS
Status: DISCONTINUED | OUTPATIENT
Start: 2022-01-17 | End: 2022-01-17

## 2022-01-17 RX ORDER — PROCHLORPERAZINE 25 MG
25 SUPPOSITORY, RECTAL RECTAL EVERY 12 HOURS PRN
Status: DISCONTINUED | OUTPATIENT
Start: 2022-01-17 | End: 2022-01-17 | Stop reason: HOSPADM

## 2022-01-17 RX ORDER — ONDANSETRON 4 MG/1
4 TABLET, ORALLY DISINTEGRATING ORAL EVERY 6 HOURS PRN
Status: DISCONTINUED | OUTPATIENT
Start: 2022-01-17 | End: 2022-01-19 | Stop reason: HOSPADM

## 2022-01-17 RX ORDER — ONDANSETRON 4 MG/1
4 TABLET, ORALLY DISINTEGRATING ORAL EVERY 6 HOURS PRN
Status: DISCONTINUED | OUTPATIENT
Start: 2022-01-17 | End: 2022-01-17 | Stop reason: HOSPADM

## 2022-01-17 RX ORDER — METOCLOPRAMIDE 10 MG/1
10 TABLET ORAL EVERY 6 HOURS PRN
Status: DISCONTINUED | OUTPATIENT
Start: 2022-01-17 | End: 2022-01-17 | Stop reason: HOSPADM

## 2022-01-17 RX ORDER — SODIUM CHLORIDE, SODIUM LACTATE, POTASSIUM CHLORIDE, CALCIUM CHLORIDE 600; 310; 30; 20 MG/100ML; MG/100ML; MG/100ML; MG/100ML
INJECTION, SOLUTION INTRAVENOUS CONTINUOUS PRN
Status: DISCONTINUED | OUTPATIENT
Start: 2022-01-17 | End: 2022-01-17 | Stop reason: HOSPADM

## 2022-01-17 RX ORDER — OXYTOCIN/0.9 % SODIUM CHLORIDE 30/500 ML
340 PLASTIC BAG, INJECTION (ML) INTRAVENOUS CONTINUOUS PRN
Status: DISCONTINUED | OUTPATIENT
Start: 2022-01-17 | End: 2022-01-17 | Stop reason: HOSPADM

## 2022-01-17 RX ORDER — METOCLOPRAMIDE HYDROCHLORIDE 5 MG/ML
10 INJECTION INTRAMUSCULAR; INTRAVENOUS EVERY 6 HOURS PRN
Status: DISCONTINUED | OUTPATIENT
Start: 2022-01-17 | End: 2022-01-19 | Stop reason: HOSPADM

## 2022-01-17 RX ORDER — LIDOCAINE 40 MG/G
CREAM TOPICAL
Status: DISCONTINUED | OUTPATIENT
Start: 2022-01-17 | End: 2022-01-17 | Stop reason: HOSPADM

## 2022-01-17 RX ORDER — SODIUM CHLORIDE, SODIUM LACTATE, POTASSIUM CHLORIDE, CALCIUM CHLORIDE 600; 310; 30; 20 MG/100ML; MG/100ML; MG/100ML; MG/100ML
INJECTION, SOLUTION INTRAVENOUS CONTINUOUS PRN
Status: DISCONTINUED | OUTPATIENT
Start: 2022-01-17 | End: 2022-01-19 | Stop reason: HOSPADM

## 2022-01-17 RX ORDER — LIDOCAINE HYDROCHLORIDE AND EPINEPHRINE 15; 5 MG/ML; UG/ML
INJECTION, SOLUTION EPIDURAL PRN
Status: DISCONTINUED | OUTPATIENT
Start: 2022-01-17 | End: 2022-01-17

## 2022-01-17 RX ORDER — MISOPROSTOL 100 UG/1
400 TABLET ORAL
Status: DISCONTINUED | OUTPATIENT
Start: 2022-01-17 | End: 2022-01-19 | Stop reason: HOSPADM

## 2022-01-17 RX ORDER — FENTANYL CITRATE-0.9 % NACL/PF 10 MCG/ML
100 PLASTIC BAG, INJECTION (ML) INTRAVENOUS EVERY 5 MIN PRN
Status: DISCONTINUED | OUTPATIENT
Start: 2022-01-17 | End: 2022-01-17

## 2022-01-17 RX ORDER — OXYTOCIN/0.9 % SODIUM CHLORIDE 30/500 ML
1-24 PLASTIC BAG, INJECTION (ML) INTRAVENOUS CONTINUOUS
Status: DISCONTINUED | OUTPATIENT
Start: 2022-01-17 | End: 2022-01-19 | Stop reason: HOSPADM

## 2022-01-17 RX ORDER — METHYLERGONOVINE MALEATE 0.2 MG/ML
200 INJECTION INTRAVENOUS
Status: DISCONTINUED | OUTPATIENT
Start: 2022-01-17 | End: 2022-01-17 | Stop reason: HOSPADM

## 2022-01-17 RX ORDER — FENTANYL/BUPIVACAINE/NS/PF 2-1250MCG
PLASTIC BAG, INJECTION (ML) INJECTION CONTINUOUS PRN
Status: DISCONTINUED | OUTPATIENT
Start: 2022-01-17 | End: 2022-01-17

## 2022-01-17 RX ORDER — METOCLOPRAMIDE 10 MG/1
10 TABLET ORAL EVERY 6 HOURS PRN
Status: DISCONTINUED | OUTPATIENT
Start: 2022-01-17 | End: 2022-01-19 | Stop reason: HOSPADM

## 2022-01-17 RX ORDER — CARBOPROST TROMETHAMINE 250 UG/ML
250 INJECTION, SOLUTION INTRAMUSCULAR
Status: DISCONTINUED | OUTPATIENT
Start: 2022-01-17 | End: 2022-01-17 | Stop reason: HOSPADM

## 2022-01-17 RX ORDER — FENTANYL/BUPIVACAINE/NS/PF 2-1250MCG
10 PLASTIC BAG, INJECTION (ML) INJECTION CONTINUOUS
Status: DISCONTINUED | OUTPATIENT
Start: 2022-01-17 | End: 2022-01-17

## 2022-01-17 RX ADMIN — Medication 10 ML/HR: at 21:03

## 2022-01-17 RX ADMIN — Medication 100 MCG: at 21:00

## 2022-01-17 RX ADMIN — ONDANSETRON 4 MG: 2 INJECTION INTRAMUSCULAR; INTRAVENOUS at 19:35

## 2022-01-17 RX ADMIN — LIDOCAINE HYDROCHLORIDE AND EPINEPHRINE 3 ML: 15; 5 INJECTION, SOLUTION EPIDURAL at 20:43

## 2022-01-17 RX ADMIN — Medication 2 MILLI-UNITS/MIN: at 19:55

## 2022-01-17 RX ADMIN — SODIUM CHLORIDE, POTASSIUM CHLORIDE, SODIUM LACTATE AND CALCIUM CHLORIDE: 600; 310; 30; 20 INJECTION, SOLUTION INTRAVENOUS at 18:01

## 2022-01-17 ASSESSMENT — ACTIVITIES OF DAILY LIVING (ADL)
TOILETING_ISSUES: NO
FALL_HISTORY_WITHIN_LAST_SIX_MONTHS: NO

## 2022-01-18 VITALS
HEART RATE: 79 BPM | SYSTOLIC BLOOD PRESSURE: 107 MMHG | OXYGEN SATURATION: 98 % | RESPIRATION RATE: 18 BRPM | TEMPERATURE: 97.2 F | DIASTOLIC BLOOD PRESSURE: 71 MMHG

## 2022-01-18 LAB — HGB BLD-MCNC: 11.3 G/DL (ref 11.7–15.7)

## 2022-01-18 PROCEDURE — 36415 COLL VENOUS BLD VENIPUNCTURE: CPT | Performed by: STUDENT IN AN ORGANIZED HEALTH CARE EDUCATION/TRAINING PROGRAM

## 2022-01-18 PROCEDURE — 85018 HEMOGLOBIN: CPT | Performed by: STUDENT IN AN ORGANIZED HEALTH CARE EDUCATION/TRAINING PROGRAM

## 2022-01-18 PROCEDURE — 250N000011 HC RX IP 250 OP 636: Performed by: STUDENT IN AN ORGANIZED HEALTH CARE EDUCATION/TRAINING PROGRAM

## 2022-01-18 PROCEDURE — 250N000013 HC RX MED GY IP 250 OP 250 PS 637: Performed by: STUDENT IN AN ORGANIZED HEALTH CARE EDUCATION/TRAINING PROGRAM

## 2022-01-18 PROCEDURE — 120N000001 HC R&B MED SURG/OB

## 2022-01-18 RX ORDER — MEDROXYPROGESTERONE ACETATE 150 MG/ML
150 INJECTION, SUSPENSION INTRAMUSCULAR ONCE
Status: COMPLETED | OUTPATIENT
Start: 2022-01-18 | End: 2022-01-18

## 2022-01-18 RX ADMIN — DOCUSATE SODIUM 100 MG: 100 CAPSULE, LIQUID FILLED ORAL at 09:18

## 2022-01-18 RX ADMIN — IBUPROFEN 800 MG: 400 TABLET, FILM COATED ORAL at 05:59

## 2022-01-18 RX ADMIN — MEDROXYPROGESTERONE ACETATE 150 MG: 150 INJECTION, SUSPENSION, EXTENDED RELEASE INTRAMUSCULAR at 17:30

## 2022-01-18 NOTE — H&P
[facility] Delivery Admission H&P    Priscilla Garcia MRN# [id number]   Age: 31 year old YOB: 1990     Date of Admission:  2022    Primary care provider: No Ref-Primary, Physician           Chief Complaint:   Priscilla Garcia is a 31 year old  at 40w0d by 11 week US admitted for Labor. She notes her contractions started around 4 pm this afternoon and have continued to progress in intensity and frequency. She denies any vaginal leaking. She notes good fetal movements.          Pregnancy history:   This pregnancy has been complicated by maternal smoking and marijuana use as well as chlamydia in the first trimester.      OBSTETRIC HISTORY:    OB History    Para Term  AB Living   5 3 3 0 1 3   SAB IAB Ectopic Multiple Live Births   1 0 0 0 3      # Outcome Date GA Lbr Vijay/2nd Weight Sex Delivery Anes PTL Lv   5 Current            4 Term 17 40w5d   F Vag-Spont   MANNIE   3 SAB 16           2 Term 07/28/15 40w4d  3.005 kg (6 lb 10 oz) M Vag-Vacuum None N MANNIE      Complications: Abruptio Placenta      Apgar1: 9  Apgar5: 9   1 Term 13 40w1d  2.92 kg (6 lb 7 oz) F Vag-Spont EPI  MANNIE       PRENATAL LABS:   Lab Results   Component Value Date    ABO O 2021    RH Neg 2021    AS Neg 2021    HEPBANG Nonreactive 2021    CHPCRT  2012     Negative for C. trachomatis rRNA by transcription mediated amplification.   A negative result by transcription mediated amplification does not preclude the   presence of C. trachomatis infection because results are dependent on proper   and adequate collection, absence of inhibitors, and sufficient rRNA to be   detected.    GCPCRT  2012     Negative for N. gonorrhoeae rRNA by transcription mediated amplification.   A negative result by transcription mediated amplification does not preclude the   presence of N. gonorrhoeae infection because results are dependent on proper   and adequate  collection, absence of inhibitors, and sufficient rRNA to be   detected.    TREPAB Negative 09/12/2012    RUBELLAABIGG 162 09/12/2012    HGB 11.7 01/17/2022    HIV Negative 09/12/2012         MEDICATIONS:  Medications Prior to Admission   Medication Sig Dispense Refill Last Dose     docusate sodium (COLACE) 50 MG capsule Take 2 capsules (100 mg) by mouth 2 times daily as needed for constipation 60 capsule 0 More than a month at Unknown time     famotidine (PEPCID) 20 MG tablet Take 1 tablet (20 mg) by mouth 2 times daily as needed (GERD) 60 tablet 0 1/16/2022 at Unknown time     Prenatal Vit-Fe Sulfate-FA (PRENATAL MULTIVIT-IRON PO) Take  by mouth.   Past Week at Unknown time   .        Maternal Past Medical History:     Past Medical History:   Diagnosis Date     Anxiety disorder     No Comments Provided     Congenital cerebral cysts (H)     patient reported     Endometriosis     No Comments Provided     She specifically denies asthma, HTN, DM or history of VTE    SURGICAL HISTORY:  Past Surgical History:   Procedure Laterality Date     LAPAROSCOPY DIAGNOSTIC (GYN)      2009,pelvic endometriosis                   GYN HISTORY:  -Chlamydia in first trimester, s/p treatment  -Most recent pap smear per patient was July 1, 2021 NIL, HPV negative    ALLERGIES:     Allergies   Allergen Reactions     Latex Other (See Comments) and Rash     Local reaction of irritation/numbness     Coconut [Coconut Oil] Hives and Difficulty breathing     Aspirin Hives and Other (See Comments)     Other reaction(s): Bleeding  Family disorder of bleeding so she does not use     Coconut Oil Rash     Other reaction(s): Angioedema             Social History:     Social History     Tobacco Use     Smoking status: Light Tobacco Smoker     Packs/day: 0.25     Years: 1.50     Pack years: 0.37     Types: Cigarettes     Smokeless tobacco: Never Used     Tobacco comment: Quit smoking: cutting  back   Vaping Use     Vaping Use: Never used   Substance Use  Topics     Alcohol use: No     Alcohol/week: 0.0 standard drinks     Comment: Alcoholic Drinks/day: occasionally     Drug use: Yes     Types: Marijuana     Comment: several times per week            Review of Systems:   ROS:   Skin: negative for rash, bruising  Eyes: negative for visual blurring, double vision  Ears/Nose/Throat: negative for nasal congestion, vertigo  Respiratory: No shortness of breath, dyspnea on exertion, cough, or hemoptysis  Cardiovascular: negative for palpitations, chest pain, lower extremity edema and syncope or near-syncope  Gastrointestinal: negative for, nausea, vomiting and hematemesis  Genitourinary: negative for, dysuria, frequency and urgency, +painful contractions  Musculoskeletal: negative for, back pain and muscular weakness  Neurologic: negative for, headaches, syncope, seizures and local weakness  Psychiatric: negative for, anxiety, depression and hallucinations  Hematologic/Lymphatic/Immunologic: negative for, anemia, chills and fever           Physical Exam:   Gen: Alert and oriented, No acute distress, well-appearing  CV: Normal heart rate to mild tachycardia with labor, regular rhythm, no audible murmur or gallop  Resp: Lungs clear to auscultation, non-labored respirations  Abd: Soft, gravid, intermittent contractions palpated, no point tenderness  Ext: No sign of asymmetric edema, erythema, or asymmetric size. No pain with movement, Negative Morteza's sign    Cervix: /-1  Membranes: AROM at 6:20 pm  EFW by Leopolds: 3100  Presentation:Cephalic    FHT: 120 bpm baseline, moderate  variability, + accelerations, no decelerations (Cat 1)  [place]: 3-4 per 10 minutes        Assessment:   Ms. Priscilla Garcia is a 30 year old yo  here for labor. She is currently 40w0d. Her pregnancy is complicated by maternal smoking and marijuana use, chlamydia in first trimester s/p treatment.           Plan:     # Term IUP: Labor progress  -- SVE: /-1  -- [place]: 3-4 q10 min  -- Membranes:  AROM 6:20 clear fluid  -- Confirmed cephalic by bsus    # FWB  -- CEFM: 120 bpm baseline, moderate  variability, + accelerations, no decelerations (Cat 1)  -- EFW: 3000 gms by leopolds    # Routine Prenatal Care  -- Datinw3d US CARMITA: 2022  -- PNLs:               O Neg, Ab screen neg              RPR nr              Hep B S Ag neg              Hep C neg              Rubella immune              HIV neg             +Chlamydia s/p treatment at first OB visit              - Gonorrhea              Pap: NIL, HPV negative     -- Genetic Screening: Discussed with patient, politely declined  -- Anatomy US:  58%ile, CL 4.2 cm anterior placenta               BPD 3%ile, HC 5%ile-- MFM Follow-up US and recommendations for no changes to medical management.  -- Immunizations: Tdap 10/13, Rhogam 10/27  -- 3rd TM labs including CBC, RPR: Hgb 12.6, RPR nr  -- 1 hr GTT: 140               3 hr GTT: 90, 109, 68, 54  -- GBS: negative     # S<D  -- Growth US: : 2414 grams, 29%ile, AC 61%ile     # Chlamydia in first trimester  -- s/p treatment, normal test of cure , negative test of reinfection      # O negative blood type  -- Rhogam 10/27     # Maternal smoking  -- Currently smokes 3-4 cigarettes daily  -- Smoking cessation discussed and recommended : noted increased risk IUGR, oligohydramnios, preE     # Maternal marijuana use  -- Notes she uses for anxiety  -- UDS on admission    # Obstetric history  --    Vaginal delivery x3        x2, VAVD x1       Pelvis proven to 3000 grams    # PPH risk  -- moderate due to multiparity    # PP Planning  -- Plans to breastfeed  -- Desires DepoProvera for contraception until vasectomy    # Pain  -- Declines epidural    # Plan  -- Admit to labor and delivery  -- AROM and expectant management  -- SVE as clinically indicated  -- Anticipate     VITO BANKS MD on 2022 at 6:31 PM

## 2022-01-18 NOTE — ANESTHESIA PREPROCEDURE EVALUATION
Anesthesia Pre-Procedure Evaluation    Patient: Priscilla Garcia   MRN: [id number] : 1990        Preoperative Diagnosis: * No pre-op diagnosis entered *    Procedure : * No procedures listed *          Past Medical History:   Diagnosis Date     Anxiety disorder     No Comments Provided     Congenital cerebral cysts (H)     patient reported     Endometriosis     No Comments Provided      Past Surgical History:   Procedure Laterality Date     LAPAROSCOPY DIAGNOSTIC (GYN)      ,pelvic endometriosis      Allergies   Allergen Reactions     Latex Other (See Comments) and Rash     Local reaction of irritation/numbness     Coconut [Coconut Oil] Hives and Difficulty breathing     Aspirin Hives and Other (See Comments)     Other reaction(s): Bleeding  Family disorder of bleeding so she does not use     Coconut Oil Rash     Other reaction(s): Angioedema      Social History     Tobacco Use     Smoking status: Light Tobacco Smoker     Packs/day: 0.25     Years: 1.50     Pack years: 0.37     Types: Cigarettes     Smokeless tobacco: Never Used     Tobacco comment: Quit smoking: cutting  back   Substance Use Topics     Alcohol use: No     Alcohol/week: 0.0 standard drinks     Comment: Alcoholic Drinks/day: occasionally      Wt Readings from Last 1 Encounters:   22 66.8 kg (147 lb 4.8 oz)        Anesthesia Evaluation            ROS/MED HX  ENT/Pulmonary:  - neg pulmonary ROS     Neurologic:  - neg neurologic ROS     Cardiovascular:       METS/Exercise Tolerance: >4 METS    Hematologic:  - neg hematologic  ROS     Musculoskeletal:       GI/Hepatic:  - neg GI/hepatic ROS     Renal/Genitourinary:  - neg Renal ROS     Endo:  - neg endo ROS     Psychiatric/Substance Use:     (+) psychiatric history anxiety Recreational drug usage: Cannabis.    Infectious Disease:  - neg infectious disease ROS     Malignancy:  - neg malignancy ROS     Other:  - neg other ROS          Physical Exam    Airway        Mallampati: I   TM  distance: > 3 FB   Neck ROM: full   Mouth opening: > 3 cm    Respiratory Devices and Support         Dental     Comment: Teeth condition very poor    (+) chipped      Cardiovascular   cardiovascular exam normal       Rhythm and rate: regular and normal     Pulmonary           breath sounds clear to auscultation           OUTSIDE LABS:  CBC:   Lab Results   Component Value Date    WBC 12.2 (H) 01/17/2022    WBC 14.6 (H) 12/06/2021    HGB 11.7 01/17/2022    HGB 12.0 12/06/2021    HCT 34.6 (L) 01/17/2022    HCT 34.0 (L) 12/06/2021     01/17/2022     12/06/2021     BMP:   Lab Results   Component Value Date     08/11/2011    POTASSIUM 4.2 08/11/2011    CHLORIDE 105 08/11/2011    CO2 30 08/11/2011    BUN 6 08/11/2011    CR 0.76 08/11/2011    CR 0.69 01/22/2009     (H) 08/11/2011    GLC 83 01/22/2009     COAGS: No results found for: PTT, INR, FIBR  POC:   Lab Results   Component Value Date    HCG Positive (Pos) 06/07/2016     HEPATIC:   Lab Results   Component Value Date    ALBUMIN 3.7 12/06/2021    PROTTOTAL 6.4 12/06/2021    ALT 8 12/06/2021    AST 14 12/06/2021    ALKPHOS 115 (H) 12/06/2021    BILITOTAL 0.3 12/06/2021     OTHER:   Lab Results   Component Value Date    INDIRA 9.4 08/11/2011    TSH 1.32 04/27/2011    CRP 3.0 08/17/2021    SED 6 01/06/2009       Anesthesia Plan    ASA Status:  2   NPO Status:  NPO Appropriate    Anesthesia Type: Epidural.              Consents    Anesthesia Plan(s) and associated risks, benefits, and realistic alternatives discussed. Questions answered and patient/representative(s) expressed understanding.    - Discussed:     - Discussed with:  Patient         Postoperative Care            Comments:                David Kellerman, APRN CRNA

## 2022-01-18 NOTE — PROGRESS NOTES
Pt presents to [facility] stating she is having regular contractions that are very painful. Cervical exam 4.5-5/80/-1. Category I tracing noted, irregular contractions. Call placed to Dr. More for admission orders. Will continue to monitor and provide interventions as needed.

## 2022-01-18 NOTE — ANESTHESIA PROCEDURE NOTES
Epidural catheter Procedure Note    Pre-Procedure   Staff -        CRNA: Kellerman, David, APRN CRNA       Performed By: CRNA       Location: OB       Procedure Start/Stop Times: 1/17/2022 8:35 PM and 1/17/2022 8:44 PM       Pre-Anesthestic Checklist: patient identified, IV checked, risks and benefits discussed, informed consent, monitors and equipment checked, pre-op evaluation and at physician/surgeon's request  Timeout:       Correct Patient: Yes        Correct Procedure: Yes        Correct Position: Yes   Procedure Documentation  Procedure: epidural catheter       Diagnosis: Labor pain/ pregnant       Patient Position: sitting       Skin prep: Betadine       Insertion Site: L3-4. (midline approach).       Technique: LORT air        ALICJA at 4 cm.       Needle Type: Touhy needle       Needle Gauge: 17.        Needle Length (Inches): 3.5        Catheter: 20 G.         Catheter threaded easily.         8 cm epidural space.         Threaded 12 cm at skin.        # of attempts: 1 and  # of redirects:     Assessment/Narrative         Paresthesias: No.       Test dose of 3 mL lidocaine 1.5% w/ 1:200,000 epinephrine at 20:44 CST.         Test dose negative, 3 minutes after injection, for signs of intravascular, subdural, or intrathecal injection.       Insertion/Infusion Method: LORT air       Aspiration negative for Heme or CSF via Epidural Catheter.

## 2022-01-18 NOTE — ANESTHESIA POSTPROCEDURE EVALUATION
Patient: Priscilla Garcia    Procedure: * No procedures listed *       Diagnosis:* No pre-op diagnosis entered *  Diagnosis Additional Information: No value filed.    Anesthesia Type:  Epidural    Note:  Disposition: Inpatient   Postop Pain Control: Uneventful            Sign Out: Well controlled pain   PONV: No   Neuro/Psych: Uneventful            Sign Out: Acceptable/Baseline neuro status   Airway/Respiratory: Uneventful            Sign Out: Acceptable/Baseline resp. status   CV/Hemodynamics: Uneventful            Sign Out: Acceptable CV status   Other NRE: NONE   DID A NON-ROUTINE EVENT OCCUR? No    Event details/Postop Comments:  Patient happy with epidural. Up walking, no residual numbness or tingling, voiding without difficulty, denies fevers or chills.             Last vitals:  Vitals Value Taken Time   BP     Temp     Pulse     Resp     SpO2         Electronically Signed By: MONSERRAT Kiran CRNA  January 18, 2022  2:11 PM

## 2022-01-18 NOTE — PROGRESS NOTES
Labor Progress Note    Ms. Garcia is feeling every contraction and is in significant pain. We have been trying multiple repositionings. She is unable to tolerate prolonged exams to help with rotation. I have been at bedside for the last hour with serial exams after a couple contractions to try to reduce the amount of time she has to go without pushing.       Exam:  /87   LMP 2021 (Exact Date)   SpO2 99%    Gen: very uncomfortable with contractions    FHT: 120 bpm, moderate variability, +accels, + intermittent variable decels with contractions (Cat 2)    [place]: 5 contractions in 10 min    Membranes: AROM at 6:20 pm      Assessment & Plan:  Ms. Garcia is a 31 year old  at 40w0d by 11 week US admitted for labor. She has progressed to 8 cm spontaneously and has been 8 cm without change for the last hour of labor. We have started pitocin to try to augment as she has been very uncomfortable with these contractions and no change is noted. She is unable to tolerate prolonged exams at this time to allow for position determination and rotation. We have been trying peanutball on both sides to allow for spontaneous rotation. At this time she is asking for an regional anesthesia: our provider is on his way in for intrathecal or epidural.    FWB: Cat 2 with intermittent variable decelerations with contractions  Labor: augmented with pitocin  Pain: desires IT or epidural  GBS: Negative  Rh: negative s/p Rhogam at 28 weeks  Rubella: Immune  Continue routine labor management: pain control and pitocin augmentation  Anticipate     VITO BANKS MD 8:14 PM

## 2022-01-18 NOTE — PROGRESS NOTES
" Postpartum Rounding Progress Note    Ms. Garcia is PPD #1 today after an uncomplicated VAVD. She reports feeling well with no acute concerns at this time. She is eager to go home. Her bleeding has slowed down and she does not endorse any clots. Pain has been well controlled with alternating tylenol and ibuprofen. She has been up and ambulating well, without feeling light-headed or dizzy. Tolerating normal diet, has been able to urinate normally and is passing flatus but has not yet had a bowel movement. No concerns for leg pain or calf pain.  She reports her mood is good. We discussed what to expect as she recovers at home including continued, scant lochia, mood fluctuations and uterine cramping, especially with breastfeeding.       Exam  Vitals:  BP Readings from Last 1 Encounters:   22 109/66     Pulse Readings from Last 1 Encounters:   22 79     Wt Readings from Last 1 Encounters:   22 66.8 kg (147 lb 4.8 oz)     Ht Readings from Last 1 Encounters:   22 1.626 m (5' 4\")     Estimated body mass index is 25.28 kg/m  as calculated from the following:    Height as of 22: 1.626 m (5' 4\").    Weight as of 22: 66.8 kg (147 lb 4.8 oz).  Temp Readings from Last 1 Encounters:   22 97.5  F (36.4  C) (Temporal)       General: No acute distress, well-appearing  CV: Normal rate, no pallor  Lungs: Non-labored respirations  Abdominal: Uterine fundus is firm, midline and just below umbilicus  Extremities: Normal movement, mild, symmetric bilateral lower extremity swelling, no sign of erythema or asymmetry. Negative Morteza's bilaterally    Assessment & Plan  Ms. Wells is a 31 y.o.  s/p VAVD at 40w0d for labor following pregnancy complicated by maternal smoking and marijuana use.    #PPD 1  --Meeting milestones appropriately  --Lochia bleeding w/o clots  --Pain adequately controlled with PRNs  --Tolerating regular diet and ambulating well.    #IWB  --Infant at bedside, doing well per " mom  --Breast feeding    #PNL  --Rh negative, Rubella immune, GBS neg    #Contraception  --Discussed options with the patient   --Patient has decided on DepoProvera prior to a vasectomy    #Disposition  --Continue routine postpartum care  --Anticipate discharge today  --Follow up in clinic in 6 weeks    VITO BANKS MD on 1/18/2022 at 4:06 PM

## 2022-01-18 NOTE — PROGRESS NOTES
Patient delivered a viable baby boy via  at  over an intact perineum after a mild shoulder dystocia, by Dr. Linda. Baby placed to mom's chest. VSS. Fundus firm, bleeding moderate. Jaymie Suarez RN on 2022 at 11:49 PM

## 2022-01-18 NOTE — L&D DELIVERY NOTE
Vaginal Delivery Note    Ms. Garcia presented to labor and delivery in active labor. She progressed spontaneously to 8 cm, and then her labor stalled. At this time she was unmedicated, and exam revealed she was ROP position. She was unable to tolerate an attempt at rotation due to discomfort. We tried peanutball and other positions, but she remained at 8 cm for over 1 hour. As she was very uncomfortable options were presented including pitocin augmentation to see if this helps with the last 2 cm of dilation vs. pain control with an epidural. She opted for both.     Her labor was augmented with pitocin and pain control offered via an epidural. After she was comfortable with the epidural, an exam was performed and I was able to gently rotate from ROP position to the FRANCISCO position. He immediately improved in station and progressed to 9 cm dilated. After the next contraction she was complete. At this point the fetal heart tracing was noted to be in the 80s. This was persistent between contractions. She was exhausted and our team had a hard time helping her to refocus due to exhaustion and anxiety at this stage. She pushed to bring him down to +3 station and he was noted to be in a direct OA position. The fetal heart rate was persistently in the 80s after a few minutes and consent was obtained to pursue a vaginally assisted delivery in the form of a kiwi Vacuum. We confirmed analgesia, her bladder had been drained approximately 30 minutes prior, an emergency OR would be available. The Kiwi vacuum was applied at the flexion point along the midline without difficulty. The suction was brought to the green zone and with the next contraction she was instructed to push. With her efforts and the Vacuum, the fetal head delivered without difficulty. The suction was immediately released and vacuum removed. The total amount of time for vacuum suction applied was 15 seconds/ 1 push. At this time the shoulders were attempted at  delivery and were not following per usual delivery. A shoulder dystocia was called and extra nursing team was available. She was instructed to stop pushing and transitioned into McRobert's position. My hand was able to follow the curve of the back and shoulders to identify that it was a left anterior shoulder and suprapubic pressure was applied in the appropriate direction. With these two maneuvers, the left shoulder slid out without difficulty. The entire time from delivery of head to delivery of shoulders and remainder of body was approximately 10 seconds. He immediately started crying and after back stimulation was vigorously crying. The umbilical cord was then clamped and cut without difficulty by the father. The baby was handed up on to mom's chest.    Active management of the third stage of labor included gentle downward traction on the umbilical cord and suprapubic pressure to ensure no uterine inversion. A small gush of blood signaled placental separation and shortly after that the placenta delivered intact without difficulty. At this time pitocin was started. Uterine fundal massage as well as bimanual exam revealed a firm uterus. A fundal sweep performed revealed some blood clots in the uterine cavity that were manually extracted. There were no remaining placental parts or membranes. Good hemostasis was noted.    An examination of the vaginal mucosa and perineum revealed an intact perineum. No repair was needed. Both Priscilla and her son, Costa, tolerated the delivery well and were recovering in the delivery room together.     APGARS: 8/9    VITO BANKS MD on 1/17/2022 at 9:38 PM

## 2022-01-19 LAB — T PALLIDUM AB SER QL: NONREACTIVE

## 2022-01-19 NOTE — DISCHARGE SUMMARY
Admit date: 2022  Discharge date: 2022    Admit Dx:   - 31 year old  at 40w0d   - Labor    Discharge Dx:  - Same as above, s/p VAVD    Procedures:  - VAVD    Admit HPI:  Ms. Priscilla Garcia is a   at 40w0d who was admitted for labor on 2022. Please see her admit H&P for full details of her PMH, PSH, Meds, Allergies and exam on admit.    Hospital course:  rPiscilla Garcia was admitted to the hospital on 2022 for the above listed indications.     Discharge Medications:  None: s/p DepoProvera    Discharge/Disposition:  Priscilla Garcia was discharged to home in stable condition with the following instructions/medications:      VITO BANKS MD on 2022 at 8:26 AM

## 2022-01-19 NOTE — PLAN OF CARE
Patient discharged to home with  and significant other. Patient given discharge instructions and she verbalizes understanding of warning s/s to watch for and follow up care needed. Ambulatory to hospital exit with staff escort.

## 2022-01-31 ENCOUNTER — MEDICAL CORRESPONDENCE (OUTPATIENT)
Dept: HEALTH INFORMATION MANAGEMENT | Facility: OTHER | Age: 32
End: 2022-01-31
Payer: COMMERCIAL

## 2022-03-18 ENCOUNTER — MEDICAL CORRESPONDENCE (OUTPATIENT)
Dept: HEALTH INFORMATION MANAGEMENT | Facility: OTHER | Age: 32
End: 2022-03-18
Payer: COMMERCIAL

## 2022-04-12 ENCOUNTER — PRENATAL OFFICE VISIT (OUTPATIENT)
Dept: OBGYN | Facility: OTHER | Age: 32
End: 2022-04-12
Attending: STUDENT IN AN ORGANIZED HEALTH CARE EDUCATION/TRAINING PROGRAM
Payer: COMMERCIAL

## 2022-04-12 VITALS
DIASTOLIC BLOOD PRESSURE: 70 MMHG | SYSTOLIC BLOOD PRESSURE: 102 MMHG | BODY MASS INDEX: 21.66 KG/M2 | WEIGHT: 126.2 LBS | HEART RATE: 80 BPM

## 2022-04-12 DIAGNOSIS — Z30.42 ENCOUNTER FOR MANAGEMENT AND INJECTION OF DEPO-PROVERA: Primary | ICD-10-CM

## 2022-04-12 PROCEDURE — 96372 THER/PROPH/DIAG INJ SC/IM: CPT | Performed by: STUDENT IN AN ORGANIZED HEALTH CARE EDUCATION/TRAINING PROGRAM

## 2022-04-12 PROCEDURE — 250N000011 HC RX IP 250 OP 636: Performed by: STUDENT IN AN ORGANIZED HEALTH CARE EDUCATION/TRAINING PROGRAM

## 2022-04-12 PROCEDURE — 99213 OFFICE O/P EST LOW 20 MIN: CPT | Performed by: STUDENT IN AN ORGANIZED HEALTH CARE EDUCATION/TRAINING PROGRAM

## 2022-04-12 RX ORDER — MEDROXYPROGESTERONE ACETATE 150 MG/ML
150 INJECTION, SUSPENSION INTRAMUSCULAR ONCE
Status: COMPLETED | OUTPATIENT
Start: 2022-04-12 | End: 2022-04-12

## 2022-04-12 RX ADMIN — MEDROXYPROGESTERONE ACETATE 150 MG: 150 INJECTION, SUSPENSION INTRAMUSCULAR at 09:38

## 2022-04-12 ASSESSMENT — EDINBURGH POSTNATAL DEPRESSION SCALE (EPDS)
I HAVE BEEN ABLE TO LAUGH AND SEE THE FUNNY SIDE OF THINGS: AS MUCH AS I ALWAYS COULD
I HAVE FELT SAD OR MISERABLE: NO, NOT AT ALL
I HAVE BLAMED MYSELF UNNECESSARILY WHEN THINGS WENT WRONG: NO, NEVER
THINGS HAVE BEEN GETTING ON TOP OF ME: NO, I HAVE BEEN COPING AS WELL AS EVER
I HAVE FELT SAD OR MISERABLE: NO, NOT AT ALL
I HAVE BEEN SO UNHAPPY THAT I HAVE BEEN CRYING: NO, NEVER
THINGS HAVE BEEN GETTING ON TOP OF ME: NO, I HAVE BEEN COPING AS WELL AS EVER
I HAVE BEEN SO UNHAPPY THAT I HAVE BEEN CRYING: NO, NEVER
I HAVE BEEN SO UNHAPPY THAT I HAVE HAD DIFFICULTY SLEEPING: NOT AT ALL
I HAVE LOOKED FORWARD WITH ENJOYMENT TO THINGS: AS MUCH AS I EVER DID
I HAVE BEEN SO UNHAPPY THAT I HAVE HAD DIFFICULTY SLEEPING: NOT AT ALL
THE THOUGHT OF HARMING MYSELF HAS OCCURRED TO ME: NEVER
I HAVE BLAMED MYSELF UNNECESSARILY WHEN THINGS WENT WRONG: NO, NEVER
THE THOUGHT OF HARMING MYSELF HAS OCCURRED TO ME: NEVER
I HAVE BEEN ANXIOUS OR WORRIED FOR NO GOOD REASON: YES, SOMETIMES
I HAVE FELT SCARED OR PANICKY FOR NO GOOD REASON: YES, SOMETIMES
TOTAL SCORE: 4
I HAVE LOOKED FORWARD WITH ENJOYMENT TO THINGS: AS MUCH AS I EVER DID
I HAVE FELT SCARED OR PANICKY FOR NO GOOD REASON: YES, SOMETIMES
I HAVE BEEN ABLE TO LAUGH AND SEE THE FUNNY SIDE OF THINGS: AS MUCH AS I ALWAYS COULD
I HAVE BEEN ANXIOUS OR WORRIED FOR NO GOOD REASON: YES, SOMETIMES

## 2022-04-12 ASSESSMENT — ANXIETY QUESTIONNAIRES
3. WORRYING TOO MUCH ABOUT DIFFERENT THINGS: SEVERAL DAYS
4. TROUBLE RELAXING: NOT AT ALL
1. FEELING NERVOUS, ANXIOUS, OR ON EDGE: MORE THAN HALF THE DAYS
GAD7 TOTAL SCORE: 5
2. NOT BEING ABLE TO STOP OR CONTROL WORRYING: SEVERAL DAYS
5. BEING SO RESTLESS THAT IT IS HARD TO SIT STILL: NOT AT ALL
GAD7 TOTAL SCORE: 5
7. FEELING AFRAID AS IF SOMETHING AWFUL MIGHT HAPPEN: SEVERAL DAYS
7. FEELING AFRAID AS IF SOMETHING AWFUL MIGHT HAPPEN: SEVERAL DAYS
6. BECOMING EASILY ANNOYED OR IRRITABLE: NOT AT ALL
GAD7 TOTAL SCORE: 5

## 2022-04-12 ASSESSMENT — PATIENT HEALTH QUESTIONNAIRE - PHQ9
SUM OF ALL RESPONSES TO PHQ QUESTIONS 1-9: 3
10. IF YOU CHECKED OFF ANY PROBLEMS, HOW DIFFICULT HAVE THESE PROBLEMS MADE IT FOR YOU TO DO YOUR WORK, TAKE CARE OF THINGS AT HOME, OR GET ALONG WITH OTHER PEOPLE: NOT DIFFICULT AT ALL
SUM OF ALL RESPONSES TO PHQ QUESTIONS 1-9: 3

## 2022-04-12 NOTE — PROGRESS NOTES
Postpartum Office Visit    S: Ms. Garcia is a  who is s/p an  on 2022. Her delivery was a VAVD for fetal heart tones and 10 second shoulder dystocia, resolved with McRobert's maneuver and suprapubic pressure. Her postpartum course in the hospital was also uncomplicated. She is feeling well today. She has no acute concerns. She notes her mood has been good, denies depression or any concern for harm to herself or others. She has been breast feeding the baby. She has been using DepoProvera. She has not yet been sexually active.    O: /70   Pulse 80   Wt 57.2 kg (126 lb 3.2 oz)   LMP 2022   Breastfeeding Yes   BMI 21.66 kg/m    Gen: Well-appearing, NAD  Pelvic: Normal appearing external genitalia, normal hair distribution. Introitus intact. Vagina is moist and pink. There is no vaginal discharge. The cervix is closed and without lesions. Bimanual exam reveals a uterus that has returned to 8 week size, mobile, midline, anteverted and no CMT or fundal tenderness.    Assessment:  Ms. Priscilla Garcia is a 31 year old yo now  who is s/p  on 2022. She is doing well in the postpartum period.    Plan:  # Routine postpartum care  -- Feeling well, no concerns  -- Breast feeding  -- Contraception goals: Continue DepoProvera  -- Mood stable and doing well  -- Last pap: 2021: NIL, due 2024    Return to clinic with any questions or concerns    Mindi Linda MD  OB/GYN  2022 9:23 AM        Answers for HPI/ROS submitted by the patient on 2022  If you checked off any problems, how difficult have these problems made it for you to do your work, take care of things at home, or get along with other people?: Not difficult at all  PHQ9 TOTAL SCORE: 3  JASON 7 TOTAL SCORE: 5

## 2022-04-12 NOTE — NURSING NOTE
Pt presents to clinic today for post partum check.    Medication Reconciliation: complete  Paula James LPN

## 2022-04-13 ASSESSMENT — ANXIETY QUESTIONNAIRES: GAD7 TOTAL SCORE: 5

## 2022-04-13 ASSESSMENT — PATIENT HEALTH QUESTIONNAIRE - PHQ9: SUM OF ALL RESPONSES TO PHQ QUESTIONS 1-9: 3

## 2022-05-20 ENCOUNTER — MEDICAL CORRESPONDENCE (OUTPATIENT)
Dept: HEALTH INFORMATION MANAGEMENT | Facility: OTHER | Age: 32
End: 2022-05-20
Payer: COMMERCIAL

## 2022-05-27 ENCOUNTER — HOSPITAL ENCOUNTER (OUTPATIENT)
Dept: GENERAL RADIOLOGY | Facility: OTHER | Age: 32
Discharge: HOME | End: 2022-05-27
Attending: FAMILY MEDICINE
Payer: COMMERCIAL

## 2022-05-27 ENCOUNTER — OFFICE VISIT (OUTPATIENT)
Dept: FAMILY MEDICINE | Facility: OTHER | Age: 32
End: 2022-05-27
Payer: COMMERCIAL

## 2022-05-27 VITALS
DIASTOLIC BLOOD PRESSURE: 66 MMHG | OXYGEN SATURATION: 98 % | BODY MASS INDEX: 21.35 KG/M2 | RESPIRATION RATE: 16 BRPM | HEART RATE: 129 BPM | WEIGHT: 124.4 LBS | TEMPERATURE: 98 F | SYSTOLIC BLOOD PRESSURE: 100 MMHG

## 2022-05-27 DIAGNOSIS — M54.42 CHRONIC MIDLINE LOW BACK PAIN WITH LEFT-SIDED SCIATICA: Primary | ICD-10-CM

## 2022-05-27 DIAGNOSIS — G89.29 CHRONIC MIDLINE LOW BACK PAIN WITH LEFT-SIDED SCIATICA: Primary | ICD-10-CM

## 2022-05-27 DIAGNOSIS — G89.29 CHRONIC MIDLINE LOW BACK PAIN WITH LEFT-SIDED SCIATICA: ICD-10-CM

## 2022-05-27 DIAGNOSIS — M54.42 CHRONIC MIDLINE LOW BACK PAIN WITH LEFT-SIDED SCIATICA: ICD-10-CM

## 2022-05-27 PROCEDURE — G0463 HOSPITAL OUTPT CLINIC VISIT: HCPCS | Mod: 25

## 2022-05-27 PROCEDURE — G0463 HOSPITAL OUTPT CLINIC VISIT: HCPCS

## 2022-05-27 PROCEDURE — 99213 OFFICE O/P EST LOW 20 MIN: CPT | Performed by: FAMILY MEDICINE

## 2022-05-27 PROCEDURE — 72100 X-RAY EXAM L-S SPINE 2/3 VWS: CPT

## 2022-05-27 ASSESSMENT — ANXIETY QUESTIONNAIRES
GAD7 TOTAL SCORE: 0
GAD7 TOTAL SCORE: 0
6. BECOMING EASILY ANNOYED OR IRRITABLE: NOT AT ALL
1. FEELING NERVOUS, ANXIOUS, OR ON EDGE: NOT AT ALL
7. FEELING AFRAID AS IF SOMETHING AWFUL MIGHT HAPPEN: NOT AT ALL
IF YOU CHECKED OFF ANY PROBLEMS ON THIS QUESTIONNAIRE, HOW DIFFICULT HAVE THESE PROBLEMS MADE IT FOR YOU TO DO YOUR WORK, TAKE CARE OF THINGS AT HOME, OR GET ALONG WITH OTHER PEOPLE: NOT DIFFICULT AT ALL
3. WORRYING TOO MUCH ABOUT DIFFERENT THINGS: NOT AT ALL
5. BEING SO RESTLESS THAT IT IS HARD TO SIT STILL: NOT AT ALL
2. NOT BEING ABLE TO STOP OR CONTROL WORRYING: NOT AT ALL

## 2022-05-27 ASSESSMENT — PATIENT HEALTH QUESTIONNAIRE - PHQ9: 5. POOR APPETITE OR OVEREATING: NOT AT ALL

## 2022-05-27 ASSESSMENT — ENCOUNTER SYMPTOMS: BACK PAIN: 1

## 2022-05-27 ASSESSMENT — PAIN SCALES - GENERAL: PAINLEVEL: SEVERE PAIN (6)

## 2022-05-27 NOTE — PROGRESS NOTES
"  Assessment & Plan     (M54.42,  G89.29) Chronic midline low back pain with left-sided sciatica  (primary encounter diagnosis)  Comment: this is higher than were a typical epidural needle would penetrate. It would be very unusual to have ongoing infection of a clot this far out (since Jan.), but she has experienced some lower extremity weakness with this.  Will arrange imaging as a result.    Plan: XR Lumbar Spine 2/3 Views, MR Lumbar Spine w/o         Contrast                        Tobacco Cessation:   reports that she has been smoking cigarettes. She has a 0.38 pack-year smoking history. She has never used smokeless tobacco.      BMI:   Estimated body mass index is 21.35 kg/m  as calculated from the following:    Height as of 1/6/22: 1.626 m (5' 4\").    Weight as of this encounter: 56.4 kg (124 lb 6.4 oz).           No follow-ups on file.    Baldomero Pineda MD  [facility]    Subjective   Priscilla is a 31 year old who presents for the following health issues     Back Pain     History of Present Illness       Reason for visit:  Back pain after epidural    She eats 2-3 servings of fruits and vegetables daily.She consumes 2 sweetened beverage(s) daily.She exercises with enough effort to increase her heart rate 30 to 60 minutes per day.  She exercises with enough effort to increase her heart rate 5 days per week.   She is taking medications regularly.       Pain History:  When did you first notice your pain? - Chronic Pain   Have you seen this provider for your pain in the past?   No   Where in your body do your have pain? Mid back  Are you seeing anyone else for your pain? No  What makes your pain better? Right postion  What makes your pain worse? Touch, laying lifting  How has pain affected your ability to work? Not applicable  Who lives in your household?     PHQ-9 SCORE 3/28/2017 6/11/2021 4/12/2022   PHQ-9 Total Score MyChart - - 3 (Minimal depression)   PHQ-9 Total Score 1 2 3       JASON-7 SCORE " 4/12/2022 5/27/2022   Total Score 5 (mild anxiety) -   Total Score 5 0     Back pain since Jan 17.  She had pain with her epidural during labor.  It has never gone away since then, and has been getting progressively worse. Recently her BF lightly touched it and triggered more pain. Cannot lay on it now.  Hard time sleeping.  No fevers.  A few days ago she had shooting pain into the left leg, followed by involuntary muscle movements. She also had a sense the left knee was instable, with going up steps. Has not fallen.  No prior back problems.         Chronic Pain - Initial Assessment:    How would you describe your pain? shooting  Have you had any recent changes to the severity or character of your pain? worsening  Is there an underlying cause that has been identified? epidural  Has your ability to work or do daily activities changed recently because of your pain? Cannot do dishes  Which of these pain treatments have you tried? Massage , stretches  Previous medication treatments:  NSAIDs - ibuprofen (Motrin)                Review of Systems   Musculoskeletal: Positive for back pain.            Objective    /66   Pulse (!) 129   Temp 98  F (36.7  C)   Resp 16   Wt 56.4 kg (124 lb 6.4 oz)   SpO2 98%   Breastfeeding Yes   BMI 21.35 kg/m    Body mass index is 21.35 kg/m .  Physical Exam  Constitutional:       Appearance: Normal appearance.   Musculoskeletal:      Comments: Back is normal on inspection.  Some pain on palpation at the L1 level, not pinpoint.  Lower extremity strength is normal, negative straight leg raise.     Neurological:      General: No focal deficit present.      Mental Status: She is alert and oriented to person, place, and time.   Psychiatric:         Mood and Affect: Mood normal.         Behavior: Behavior normal.            Xray - Reviewed and interpreted by me.  Normal

## 2022-05-27 NOTE — NURSING NOTE
"Chief Complaint   Patient presents with     Back Pain     Sense having the back injection for birth       Initial /66   Pulse (!) 129   Temp 98  F (36.7  C)   Resp 16   Wt 56.4 kg (124 lb 6.4 oz)   SpO2 98%   Breastfeeding Yes   BMI 21.35 kg/m   Estimated body mass index is 21.35 kg/m  as calculated from the following:    Height as of 1/6/22: 1.626 m (5' 4\").    Weight as of this encounter: 56.4 kg (124 lb 6.4 oz).  Medication Reconciliation: complete.  FOOD SECURITY SCREENING QUESTIONS  Hunger Vital Signs:  Within the past 12 months we worried whether our food would run out before we got money to buy more. Never  Within the past 12 months the food we bought just didn't last and we didn't have money to get more. Never  Funmilayo Carlin LPN 5/27/2022 1:13 PM      Funmilayo Carlin LPN  "

## 2022-06-08 ENCOUNTER — OFFICE VISIT (OUTPATIENT)
Dept: FAMILY MEDICINE | Facility: OTHER | Age: 32
End: 2022-06-08
Attending: STUDENT IN AN ORGANIZED HEALTH CARE EDUCATION/TRAINING PROGRAM
Payer: COMMERCIAL

## 2022-06-08 VITALS
WEIGHT: 124 LBS | TEMPERATURE: 97.9 F | HEIGHT: 64 IN | OXYGEN SATURATION: 98 % | HEART RATE: 92 BPM | RESPIRATION RATE: 20 BRPM | SYSTOLIC BLOOD PRESSURE: 118 MMHG | BODY MASS INDEX: 21.17 KG/M2 | DIASTOLIC BLOOD PRESSURE: 64 MMHG

## 2022-06-08 DIAGNOSIS — F41.1 GAD (GENERALIZED ANXIETY DISORDER): ICD-10-CM

## 2022-06-08 DIAGNOSIS — Z00.00 ROUTINE GENERAL MEDICAL EXAMINATION AT A HEALTH CARE FACILITY: Primary | ICD-10-CM

## 2022-06-08 LAB
ALBUMIN SERPL-MCNC: 4.9 G/DL (ref 3.5–5.7)
ALP SERPL-CCNC: 82 U/L (ref 34–104)
ALT SERPL W P-5'-P-CCNC: 10 U/L (ref 7–52)
ANION GAP SERPL CALCULATED.3IONS-SCNC: 6 MMOL/L (ref 3–14)
AST SERPL W P-5'-P-CCNC: 12 U/L (ref 13–39)
BASOPHILS # BLD AUTO: 0.1 10E3/UL (ref 0–0.2)
BASOPHILS NFR BLD AUTO: 1 %
BILIRUB SERPL-MCNC: 0.4 MG/DL (ref 0.3–1)
BUN SERPL-MCNC: 14 MG/DL (ref 7–25)
CALCIUM SERPL-MCNC: 9.9 MG/DL (ref 8.6–10.3)
CHLORIDE BLD-SCNC: 111 MMOL/L (ref 98–107)
CO2 SERPL-SCNC: 22 MMOL/L (ref 21–31)
CREAT SERPL-MCNC: 0.78 MG/DL (ref 0.6–1.2)
EOSINOPHIL # BLD AUTO: 0.2 10E3/UL (ref 0–0.7)
EOSINOPHIL NFR BLD AUTO: 2 %
ERYTHROCYTE [DISTWIDTH] IN BLOOD BY AUTOMATED COUNT: 12.8 % (ref 10–15)
GFR SERPL CREATININE-BSD FRML MDRD: >90 ML/MIN/1.73M2
GLUCOSE BLD-MCNC: 89 MG/DL (ref 70–105)
HCT VFR BLD AUTO: 43.8 % (ref 35–47)
HGB BLD-MCNC: 15.3 G/DL (ref 11.7–15.7)
IMM GRANULOCYTES # BLD: 0 10E3/UL
IMM GRANULOCYTES NFR BLD: 0 %
LYMPHOCYTES # BLD AUTO: 1.4 10E3/UL (ref 0.8–5.3)
LYMPHOCYTES NFR BLD AUTO: 19 %
MCH RBC QN AUTO: 29.8 PG (ref 26.5–33)
MCHC RBC AUTO-ENTMCNC: 34.9 G/DL (ref 31.5–36.5)
MCV RBC AUTO: 85 FL (ref 78–100)
MONOCYTES # BLD AUTO: 0.6 10E3/UL (ref 0–1.3)
MONOCYTES NFR BLD AUTO: 8 %
NEUTROPHILS # BLD AUTO: 5.1 10E3/UL (ref 1.6–8.3)
NEUTROPHILS NFR BLD AUTO: 70 %
NRBC # BLD AUTO: 0 10E3/UL
NRBC BLD AUTO-RTO: 0 /100
PLATELET # BLD AUTO: 243 10E3/UL (ref 150–450)
POTASSIUM BLD-SCNC: 4 MMOL/L (ref 3.5–5.1)
PROT SERPL-MCNC: 7.6 G/DL (ref 6.4–8.9)
RBC # BLD AUTO: 5.14 10E6/UL (ref 3.8–5.2)
SODIUM SERPL-SCNC: 139 MMOL/L (ref 134–144)
TSH SERPL DL<=0.005 MIU/L-ACNC: 1.69 MU/L (ref 0.4–4)
WBC # BLD AUTO: 7.3 10E3/UL (ref 4–11)

## 2022-06-08 PROCEDURE — 84443 ASSAY THYROID STIM HORMONE: CPT | Mod: ZL | Performed by: STUDENT IN AN ORGANIZED HEALTH CARE EDUCATION/TRAINING PROGRAM

## 2022-06-08 PROCEDURE — 85018 HEMOGLOBIN: CPT | Mod: ZL | Performed by: STUDENT IN AN ORGANIZED HEALTH CARE EDUCATION/TRAINING PROGRAM

## 2022-06-08 PROCEDURE — 36415 COLL VENOUS BLD VENIPUNCTURE: CPT | Mod: ZL | Performed by: STUDENT IN AN ORGANIZED HEALTH CARE EDUCATION/TRAINING PROGRAM

## 2022-06-08 PROCEDURE — 99395 PREV VISIT EST AGE 18-39: CPT | Performed by: STUDENT IN AN ORGANIZED HEALTH CARE EDUCATION/TRAINING PROGRAM

## 2022-06-08 PROCEDURE — 80053 COMPREHEN METABOLIC PANEL: CPT | Mod: ZL | Performed by: STUDENT IN AN ORGANIZED HEALTH CARE EDUCATION/TRAINING PROGRAM

## 2022-06-08 ASSESSMENT — ENCOUNTER SYMPTOMS
HEADACHES: 1
ABDOMINAL PAIN: 0
DYSURIA: 0
HEARTBURN: 0
WEAKNESS: 0
FEVER: 0
COUGH: 0
FREQUENCY: 0
CONSTIPATION: 0
ARTHRALGIAS: 0
DIZZINESS: 0
NERVOUS/ANXIOUS: 1
JOINT SWELLING: 0
BREAST MASS: 0
PALPITATIONS: 0
SHORTNESS OF BREATH: 0
PARESTHESIAS: 0
HEMATURIA: 0
CHILLS: 0
MYALGIAS: 0
NAUSEA: 0
HEMATOCHEZIA: 0
SORE THROAT: 0
EYE PAIN: 0
DIARRHEA: 0

## 2022-06-08 ASSESSMENT — ANXIETY QUESTIONNAIRES
4. TROUBLE RELAXING: SEVERAL DAYS
2. NOT BEING ABLE TO STOP OR CONTROL WORRYING: MORE THAN HALF THE DAYS
GAD7 TOTAL SCORE: 11
GAD7 TOTAL SCORE: 11
5. BEING SO RESTLESS THAT IT IS HARD TO SIT STILL: SEVERAL DAYS
3. WORRYING TOO MUCH ABOUT DIFFERENT THINGS: MORE THAN HALF THE DAYS
8. IF YOU CHECKED OFF ANY PROBLEMS, HOW DIFFICULT HAVE THESE MADE IT FOR YOU TO DO YOUR WORK, TAKE CARE OF THINGS AT HOME, OR GET ALONG WITH OTHER PEOPLE?: SOMEWHAT DIFFICULT
7. FEELING AFRAID AS IF SOMETHING AWFUL MIGHT HAPPEN: MORE THAN HALF THE DAYS
6. BECOMING EASILY ANNOYED OR IRRITABLE: SEVERAL DAYS
1. FEELING NERVOUS, ANXIOUS, OR ON EDGE: MORE THAN HALF THE DAYS
7. FEELING AFRAID AS IF SOMETHING AWFUL MIGHT HAPPEN: MORE THAN HALF THE DAYS
GAD7 TOTAL SCORE: 11

## 2022-06-08 ASSESSMENT — PATIENT HEALTH QUESTIONNAIRE - PHQ9
SUM OF ALL RESPONSES TO PHQ QUESTIONS 1-9: 3
10. IF YOU CHECKED OFF ANY PROBLEMS, HOW DIFFICULT HAVE THESE PROBLEMS MADE IT FOR YOU TO DO YOUR WORK, TAKE CARE OF THINGS AT HOME, OR GET ALONG WITH OTHER PEOPLE: SOMEWHAT DIFFICULT
SUM OF ALL RESPONSES TO PHQ QUESTIONS 1-9: 3

## 2022-06-08 ASSESSMENT — PAIN SCALES - GENERAL: PAINLEVEL: MILD PAIN (3)

## 2022-06-08 NOTE — LETTER
June 8, 2022      Priscilla Garcia  [address] [address] [place] [id number]        Dear ,    We are writing to inform you of your test results.    Your test results fall within the expected range(s) or remain unchanged from previous results.  Please continue with current treatment plan.    Resulted Orders   Comprehensive Metabolic Panel   Result Value Ref Range    Sodium 139 134 - 144 mmol/L    Potassium 4.0 3.5 - 5.1 mmol/L    Chloride 111 (H) 98 - 107 mmol/L    Carbon Dioxide (CO2) 22 21 - 31 mmol/L    Anion Gap 6 3 - 14 mmol/L    Urea Nitrogen 14 7 - 25 mg/dL    Creatinine 0.78 0.60 - 1.20 mg/dL    Calcium 9.9 8.6 - 10.3 mg/dL    Glucose 89 70 - 105 mg/dL    Alkaline Phosphatase 82 34 - 104 U/L    AST 12 (L) 13 - 39 U/L    ALT 10 7 - 52 U/L    Protein Total 7.6 6.4 - 8.9 g/dL    Albumin 4.9 3.5 - 5.7 g/dL    Bilirubin Total 0.4 0.3 - 1.0 mg/dL    GFR Estimate >90 >60 mL/min/1.73m2      Comment:      Effective December 21, 2021 eGFRcr in adults is calculated using the 2021 CKD-EPI creatinine equation which includes age and gender (Jermaine et al., [place], DOI: 10.1056/XKJEel3545439)   TSH Reflex GH   Result Value Ref Range    TSH 1.69 0.40 - 4.00 mU/L   CBC with platelets and differential   Result Value Ref Range    WBC Count 7.3 4.0 - 11.0 10e3/uL    RBC Count 5.14 3.80 - 5.20 10e6/uL    Hemoglobin 15.3 11.7 - 15.7 g/dL    Hematocrit 43.8 35.0 - 47.0 %    MCV 85 78 - 100 fL    MCH 29.8 26.5 - 33.0 pg    MCHC 34.9 31.5 - 36.5 g/dL    RDW 12.8 10.0 - 15.0 %    Platelet Count 243 150 - 450 10e3/uL    % Neutrophils 70 %    % Lymphocytes 19 %    % Monocytes 8 %    % Eosinophils 2 %    % Basophils 1 %    % Immature Granulocytes 0 %    NRBCs per 100 WBC 0 <1 /100    Absolute Neutrophils 5.1 1.6 - 8.3 10e3/uL    Absolute Lymphocytes 1.4 0.8 - 5.3 10e3/uL    Absolute Monocytes 0.6 0.0 - 1.3 10e3/uL    Absolute Eosinophils 0.2 0.0 - 0.7 10e3/uL    Absolute Basophils 0.1 0.0 - 0.2 10e3/uL    Absolute Immature Granulocytes  0.0 <=0.4 10e3/uL    Absolute NRBCs 0.0 10e3/uL       If you have any questions or concerns, please call the clinic at the number listed above.       Sincerely,      Florencia Bennett MD

## 2022-06-08 NOTE — PATIENT INSTRUCTIONS
[facility]  [address]  [place] [address]  [phone]     [facility] helps through the school systems and will help in accessing care through other providers.    Parents seeking assistance in coping with mental health concerns of their children can access information through their schools, Special Educations Services and [facility] and Human Services.    Play intervention and art therapy    Adolescent sex offender treatment is provided based on court orders.    Children's Targets Case Management    Diagnostic assessment    Pat Kaylen-Rule 5 assessments for treatment placement purposes.    Moi Crow-Psychological testing and educational testing.    [facility]  [address] [place] [address]  Appointments [phone]    Provides individual therapy, couples and family counseling for adults and teenagers 13 and up.    Psychotherapy with adults, adolescents, and children    Currently working with couple therapy    Individual therapy and group therapy with personality disorders    Psychological testing    Adults and adolescents therapy dealing with stress and anger management, depression, anxiety, grief, sexual abuse, and trauma.    Cheryl Saldana  [address] [facility] [address]  [phone]    Individual and family therapy              [facility]  [address] [place] [address]  [phone]    Treatment for adults, families, adolescents, and children. Mostly women and adolescents    [facility]  [address] [place] [address]  [phone]    9-5 Tuesday, Wednesday, and Thursday      Relationship issues and Family Matters, Parenting issues including blending families    Depression and Anxiety    Stress Management and Positive Self Care    Self Esteem and Shame Issues    Domestic Violence, Trauma and Abuse    Alcohol and Drug  concerns    EMDR (eye movement and desensitization reprocessing)    Employee Assistance    CISM (Critical Incident Stress Management)    Crisis Intervention    [facility]  Meaghan Pak  [phone]    Support group for people suffering from loss/death of loves ones.    Individual and group Restorationist, Church, and Interfaith spiritual guidance.    Shahbaz Cisse  [address] [place] [address]  [phone]    Mental Health Services Adults, adolescence, and children.     Blended families and Material Stress Management     Chemical Dependency Issues after successfully completing treatment    Anger Management and Pain Management      [place] [address]  [place] [address]  [phone]    Provided Mental Health Services for adults, adolescence, and children    Individual and Group Therapy    (CBT) Cognitive Behavioral Therapy    Play therapy, individual therapy and diagnostic testing for children and adolescence    (ARMHS) Adult Rehabilitation Mental Health Services    Adult Mental Health-Targeted Case Management    Medication Management provided by [facility]    [address] [place] [address]  [phone]    Provides Mental Health and Chemical Health to adults and adolescents.    Children and Adolescents Services:    Individual therapy    (CTSS) Children's Therapeutic Support and Services    (DBT) Outpatient Therapies: Dialectical Behavior Therapy     (DA) Diagnostic assessment from ages 0-5    EMDR (eye movement and desensitization reprocessing)    (TF-CBT) Trauma Focused-Cognitive Behavioral Therapy    Play intervention    Same day appointment      Adult Services:    Adult Mental Health-Targeted Case Management    (ARMHS) Adult Rehabilitation Mental Health Services    ([facility]) Behavioral Health Home    (PSS) Certified     (CRS) Community Residential Setting     [facility]    (N.O.W) Awareness Card    Substance Use Services          [facility]  Outpatient [facility]  [address] [place] [address]  [phone]    Children and Adolescent Services:    Day Treatment Services    Assessment and Counseling Services    Diagnostic Assessment    Psychological Evaluations    Play Therapy    School Based Therapy    Parent-Child Interaction Therapy    Adult Services:    Adult Rehabilitative Mental Health Services    Housing Support Program    Adult Target Case Management    Assessment and Counseling Services    Substance Use Disorder    Parenting Capacity Evaluations    (MICD) Mental Illness Chemical Dependency    Couples therapy    Grief Therapy    Managing and Adopting Practice (MAP    (TF-CBT) Trauma Focused-Cognitive Behavioral Therapy    EMDR (eye movement and desensitization reprocessing)    [facility]  [address] [facility] [address]  [phone]    Provides Mental Health Services to adults, adolescents, and children.    Individual, Couples and Family therapy    [facility] therapy program  to help get back on track in life     Complex Family System Program-reunification or co-parenting therapy    Cojoint therapy- children, therapist, and other members of children's support system    Children's Individual Therapy    Children's evaluation and testing-Diagnostic Assessments    Telehealth  [facility]  [address] [place] [address]  [phone]    Individual, couples and family therapy    Dual diagnosis/co-occurring disorders    Depression and Bipolar Disorder    Self-Harm Behavior    Complicated Grief Reactions    Anger and Stress Management    Adjustment Disorder    Self-image and Self-Esteem Issues    [facility]  [address] [place] [address]  [phone]    Individual Therapy for adult     Cognitive behavioral therapy (CBT)    Solution focused brief (SFBT)    Trauma focused    Clinical  Assessment    [facility]  [address] [place] [address]  [phone]    Maninder Price, MS   Licensed Alcohol and Drug Counselor, Licensed Professional Clinical Counselor  Janae Price MS   Licensed Alcohol and Drug Counselor, Licensed Marriage and Family Therapy  Latosha Ramirez MSW, [facility]   Individual therapy        [facility]  [address] [place] [address]  [phone]    Mental Health for adults offering medication management and individual therapy    Depression Disorder, Anxiety Disorder and Panic Disorder    Bipolar Disorder    Psychosis    (PTSD) Post-Trauma Stress Disorder    (ADHD) Attention Deficit Hyperactivity Disorder    Insomnia    Personality Disorder, Schizophrenia, Adjustment Disorder    Eating Disorder    Autism Spectrum Disorder    Dementia    Substance-relation Disorder    [facility]  [address]    [address] [place] [address] [place] [address]  [phone]    Mental Health and Chemical Health for adults with two locations bases on services    Medication Management     Individual therapy    Treatment planning    Psychological Testing    [facility]    Telepsychiatry    Genetic Testing    Psychiatric Consultations    Obsessive-Compulsive Disorder (OCD)    Memory Loss    Mood Disorder    Depression, Anxiety Disorder    Attention Deficit Disorder    Comprehensive Substance Use Assessment    Case Management:    [facility]  [address]  [place] [address]  [phone]     [facility] helps through the school systems and will help in accessing care through other providers.  Parents seeking assistance in coping with the mental health concerns of their children can access information through their school, Special Education Services and [facility] and Human Services.    [facility] Human [facility]  [address] [place]  [address]  Intake: [phone]  Toll free: [phone]    Case Management available to eligible families through [facility]. Contact Lee Baker at Social Service intake for more information    [facility]    [address] [place] [id number] [phone]     meet with clients on a regular basis, assess and prioritize their needs, arrange, and coordinated services to be provided, monitor services delivery, secure entitlement and other benefits and advocate for individual needs. Ensure they are being met and maintain community living.    [facility]  [address] [place] [address]  [phone]     is designed to help a child and their families coordinate needed mental health services. Case Management services include assisting the family in accessing services including, , education services, health services, recreation services and related services in volunteering, advocacy, transportation, and legal services.  assist families in obtaining a comprehensive diagnostic assessment to determine eligibility. Case mangers assist families in developing an individual family community support plan that assures continuity of care.        --------------------------------------------------------------    Mental Health Providers    [facility] ([facility]): [phone], multiple providers for therapy, diagnostic assessments with Dr. Moi Curtis, Dr. Nyasia Quezada    [facility]: [phone], multiple providers for therapy, diagnostic assessments, medication management, [facility] The[facility] Farm/shelter    [facility]: [phone], multiple providers for therapy, diagnostic assessments    [facility] counseling [phone]    [facility]: [phone], multiple providers for therapy    [facility] [phone]    [facility]  Health: [phone],or [phone] Chica Quiles, therapy for children, adolescents   and adults  [facility] [phone], [email or web address]    [facility]: [phone], multiple providers for child, adolescent and adult therapy services, med management    [phone], Jocelyne Neumann, therapy for children, adolescents and adults    [facility]: [phone], multiple providers for therapy for adolescents and adults    [facility]: [phone], counseling for children, adults and family    Myriam Black:[phone], counseling for adults and adolescents with anxiety, depression, grief, EMDR and relationship issues    Regan Alfaro:[phone], individual counseling, diagnostic assessments    [facility]: [phone], Boni Neely, [facility], ages 5 and up, medication management, family therapy    [phone], alcohol and drug counseling    [facility]: [phone], individual and family counseling, medication management    [facility]: [phone], chemical dependency for adolescents and adults, inpatient and outpatient programs    [facility]: [phone]: individual counseling for adults and adolescents 13 and up.    [facility]: [phone], Nayeli DOYLE, counseling, diagnostic assessments, medication management    [facility]: [phone], emphasis on evaluation/diagnostic services as well as individual, family and couple counseling     Annmarie Bassett [phone]      Out of Area    [facility] [phone]    [facility] [phone]    [facility] [phone]  As of 2019    CRISIS RESPONSE TEAM (CRT)/FIRST CALL FOR HELP  [phone] OR [phone]    [facility]  [phone]    Crisis Text Line  http://www.crisistextline.org  The Crisis Text Line serves  anyone, in any type of crisis, providing access free, 24/7 support and information.  Text HOME to [phone] from anywhere in the [place]       Preventive Health Recommendations  Female Ages 26 - 39  Yearly exam:   See your health care provider every year in order to    Review health changes.     Discuss preventive care.      Review your medicines if you your doctor has prescribed any.    Until age 30: Get a Pap test every three years (more often if you have had an abnormal result).    After age 30: Talk to your doctor about whether you should have a Pap test every 3 years or have a Pap test with HPV screening every 5 years.   You do not need a Pap test if your uterus was removed (hysterectomy) and you have not had cancer.  You should be tested each year for STDs (sexually transmitted diseases), if you're at risk.   Talk to your provider about how often to have your cholesterol checked.  If you are at risk for diabetes, you should have a diabetes test (fasting glucose).  Shots: Get a flu shot each year. Get a tetanus shot every 10 years.   Nutrition:     Eat at least 5 servings of fruits and vegetables each day.    Eat whole-grain bread, whole-wheat pasta and brown rice instead of white grains and rice.    Get adequate Calcium and Vitamin D.     Lifestyle    Exercise at least 150 minutes a week (30 minutes a day, 5 days of the week). This will help you control your weight and prevent disease.    Limit alcohol to one drink per day.    No smoking.     Wear sunscreen to prevent skin cancer.    See your dentist every six months for an exam and cleaning.
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
## 2022-06-08 NOTE — NURSING NOTE
Patient presents to clinic for physical exam and establish care with Florencia Jones MD.    Medication Rec Complete  Kimberly Alva LPN............6/8/2022 10:24 AM

## 2022-06-09 ENCOUNTER — HOSPITAL ENCOUNTER (OUTPATIENT)
Dept: MRI IMAGING | Facility: OTHER | Age: 32
Discharge: HOME | End: 2022-06-09
Attending: FAMILY MEDICINE | Admitting: FAMILY MEDICINE
Payer: COMMERCIAL

## 2022-06-09 DIAGNOSIS — G89.29 CHRONIC MIDLINE LOW BACK PAIN WITH LEFT-SIDED SCIATICA: ICD-10-CM

## 2022-06-09 DIAGNOSIS — M54.42 CHRONIC MIDLINE LOW BACK PAIN WITH LEFT-SIDED SCIATICA: ICD-10-CM

## 2022-06-09 PROCEDURE — 72148 MRI LUMBAR SPINE W/O DYE: CPT

## 2022-06-20 ENCOUNTER — OFFICE VISIT (OUTPATIENT)
Dept: FAMILY MEDICINE | Facility: OTHER | Age: 32
End: 2022-06-20
Attending: STUDENT IN AN ORGANIZED HEALTH CARE EDUCATION/TRAINING PROGRAM
Payer: COMMERCIAL

## 2022-06-20 VITALS
TEMPERATURE: 98.2 F | RESPIRATION RATE: 18 BRPM | BODY MASS INDEX: 21.28 KG/M2 | SYSTOLIC BLOOD PRESSURE: 114 MMHG | WEIGHT: 124 LBS | DIASTOLIC BLOOD PRESSURE: 60 MMHG | HEART RATE: 91 BPM | OXYGEN SATURATION: 97 %

## 2022-06-20 DIAGNOSIS — M54.41 CHRONIC MIDLINE LOW BACK PAIN WITH BILATERAL SCIATICA: Primary | ICD-10-CM

## 2022-06-20 DIAGNOSIS — M54.42 CHRONIC MIDLINE LOW BACK PAIN WITH BILATERAL SCIATICA: Primary | ICD-10-CM

## 2022-06-20 DIAGNOSIS — G89.29 CHRONIC MIDLINE LOW BACK PAIN WITH BILATERAL SCIATICA: Primary | ICD-10-CM

## 2022-06-20 DIAGNOSIS — F43.10 PTSD (POST-TRAUMATIC STRESS DISORDER): ICD-10-CM

## 2022-06-20 DIAGNOSIS — F41.1 GAD (GENERALIZED ANXIETY DISORDER): ICD-10-CM

## 2022-06-20 PROCEDURE — G0463 HOSPITAL OUTPT CLINIC VISIT: HCPCS

## 2022-06-20 PROCEDURE — 99213 OFFICE O/P EST LOW 20 MIN: CPT | Performed by: STUDENT IN AN ORGANIZED HEALTH CARE EDUCATION/TRAINING PROGRAM

## 2022-06-20 RX ORDER — NAPROXEN 500 MG/1
500 TABLET ORAL 2 TIMES DAILY WITH MEALS
Qty: 90 TABLET | Refills: 3 | Status: SHIPPED | OUTPATIENT
Start: 2022-06-20

## 2022-06-20 ASSESSMENT — PAIN SCALES - GENERAL: PAINLEVEL: MODERATE PAIN (5)

## 2022-06-20 NOTE — PROGRESS NOTES
Assessment & Plan     Chronic midline low back pain with bilateral sciatica  We did discuss PT and I think that would be of the most benefit for her, unfortunately due to lack of childcare right now this is not an option for her. She is apprehensive to start any kind of medication due to breastfeeding. Advised to take tylenol 1000 mg TID, start naproxen with food BID, voltaren gel PRN, and she will  lidocaine patches to use prn. Continue heat, ice, massage, and movement. Follow up if worsening.   - naproxen (NAPROSYN) 500 MG tablet; Take 1 tablet (500 mg) by mouth 2 times daily (with meals)  - diclofenac (VOLTAREN) 1 % topical gel; Apply 2 g topically 4 times daily as needed for moderate pain    JASON (generalized anxiety disorder)  PTSD (post-traumatic stress disorder)  Feels her mood is stable with therapy. Needing letter of approval for emotional support animal. I did discuss with her that I do not write those letters but will write a letter stating her current medical issues and that she feels she benefits from her dog. She is ok with that       Reviewed her labs. Discussed that the chloride levels are slightly elevated, we just monitor. AST slight low, we will just monitor are all over labs are normal right now                   No follow-ups on file.    Florencia Bennett MD  [facility] AND [facility]   Priscilla is a 31 year old, presenting for the following health issues:  Follow Up (Lumbar spine MRI 06/19/22, lab work results)      History of Present Illness       Reason for visit:  Had mri and trying to figure out what s wrong with my back    She eats 0-1 servings of fruits and vegetables daily.She consumes 2 sweetened beverage(s) daily.She exercises with enough effort to increase her heart rate 20 to 29 minutes per day.  She exercises with enough effort to increase her heart rate 5 days per week.   She is taking medications regularly.       Low back pain   - has been an on going  issue for about 5 mos, worsening lately but has been present since she delivered her son 5 mos ago   - at home is trying stretches, tylenol, ibuprofen, heat, massage  - at bedtime has to keep moving to get comfortable  - shooting pain down left, but also can be right depending on position   - sometimes light touch can cause the shooting pain  - her partner has told her she is walking differently   - has 4 children and is a [facility] with limited resources for childcare so does not feel she could do PT   - is currently breastfeeding so is worried about medications       Mood  - follows with [facility] counselor for anxiety, PTSD, and panic disorder  - does not want meds for this and feels therapy is going well for her  - she has a dog and is moving soon, would like an emotional support animal letter  - has had her information from [facility] released to be faxed over here as she wants me to be up to date on her mental health    Labs  - saw on her last labs that her chloride was elevated and AST was slightly low   - wondering what this means           Review of Systems   Constitutional, HEENT, cardiovascular, pulmonary, gi and gu systems are negative, except as otherwise noted.      Objective    /60 (BP Location: Right arm, Patient Position: Sitting, Cuff Size: Adult Regular)   Pulse 91   Temp 98.2  F (36.8  C) (Tympanic)   Resp 18   Wt 56.2 kg (124 lb)   LMP  (LMP Unknown)   SpO2 97%   Breastfeeding Yes   BMI 21.28 kg/m    Body mass index is 21.28 kg/m .  Physical Exam   GENERAL: healthy, alert and no distress  MS: no gross musculoskeletal defects noted, no edema  SKIN: no suspicious lesions or rashes  NEURO: Normal strength and tone, mentation intact and speech normal  Comprehensive back pain exam:  No tenderness, Range of motion not limited by pain, Lower extremity strength functional and equal on both sides, Lower extremity reflexes within normal limits bilaterally, Lower extremity sensation normal and  equal on both sides and Straight leg raise negative bilaterally  PSYCH: mentation appears normal, affect normal/bright    PROCEDURE: XR LUMBAR SPINE 2-3 VIEWS 5/27/2022 1:48 PM     HISTORY: Chronic midline low back pain with left-sided sciatica;  Chronic midline low back pain with left-sided sciatica     COMPARISONS: None.     TECHNIQUE: AP and lateral     FINDINGS: The lumbar disks are normal in height. The vertebral bodies  and arches are intact. The paravertebral soft tissues are normal.                                                                        IMPRESSION: Normal lumbar spine     CAM MERRITT MD       Exam: MR LUMBAR SPINE W/O CONTRAST     Exam Reason: Low back pain, > 6 wks; Chronic midline low back pain  with left-sided sciatica; Chronic midline low back pain with  left-sided sciatica     Technique: Sagittal T1, T2, and STIR as well as axial T2 images of the  lumbar spine were obtained.      Comparison: 5/27/2022.     FINDINGS:       Normal lumbar lordosis is maintained.  No abnormalities of alignment  are identified.      The vertebral body heights are preserved.      The marrow signal is unremarkable.     The distal cord and conus medullaris have a normal caliber and  morphology. The conus terminates at L1. No abnormal cord signal is  seen in the distal cord or conus. There are no masses in the spinal  canal or paravertebral soft tissues.     L1-2: Disc height is maintained. No central canal or neural foraminal  narrowing.      L2-3: Disc height is maintained. No central canal or neural foraminal  narrowing. Minimal facet hypertrophy.     L3-4: Disc height is maintained. No central canal or neural foraminal  narrowing. Minimal facet hypertrophy.     L4-5: Disc height is maintained. No central canal or neural foraminal  narrowing. Mild facet hypertrophy.     L5-S1: Disc height is maintained. No central canal or neural foraminal  narrowing. Mild facet hypertrophy.                                                                       IMPRESSION:     Mild facet arthropathy in the lower lumbar spine. No central canal or  neural foraminal narrowing.     JAIME ARCHER MD                 .  ..

## 2022-06-20 NOTE — LETTER
June 20, 2022      Priscilla Garcia  [address] [address] [place] [id number]        To Whom It May Concern,     Priscilla Garcia attended clinic here on Jun 20, 2022. Patient has a history of PTSD, generalized anxiety, and panic attacks. Would benefit from having her animal with her.     If you have questions or concerns, please call the clinic at the number listed above.    Sincerely,         Florencia Bennett MD

## 2022-06-20 NOTE — NURSING NOTE
Patient presents to clinic for follow up after lumbar spine completed on 06/19/22.  Also, lab work results.  Medication Rec Complete  Kimberly Alva LPN............6/20/2022 8:07 AM

## 2022-06-20 NOTE — PATIENT INSTRUCTIONS
Take tylenol 1000 mg three times a day   Start naproxen twice a day (with food)  Apply voltaren gel up to 4 times a day as needed for pain    over the counter lidocaine patches  Keep doing heat or ice for pain       An email for the [employer] set up was sent to you

## 2022-07-18 ENCOUNTER — MEDICAL CORRESPONDENCE (OUTPATIENT)
Dept: HEALTH INFORMATION MANAGEMENT | Facility: OTHER | Age: 32
End: 2022-07-18

## 2022-09-06 DIAGNOSIS — Z30.42 ENCOUNTER FOR SURVEILLANCE OF INJECTABLE CONTRACEPTIVE: Primary | ICD-10-CM

## 2022-09-06 NOTE — PROGRESS NOTES
Pt is coming in Thursday for Depo injection. Pt needs updated orders for depo injection. Medication T'ed up in chart. Please advise. Thank you     Tammi Aguilar RN on 9/6/2022 at 1:12 PM

## 2022-09-07 RX ORDER — MEDROXYPROGESTERONE ACETATE 150 MG/ML
150 INJECTION, SUSPENSION INTRAMUSCULAR
Status: DISCONTINUED | OUTPATIENT
Start: 2022-09-07 | End: 2023-10-31

## 2022-09-08 ENCOUNTER — ALLIED HEALTH/NURSE VISIT (OUTPATIENT)
Dept: FAMILY MEDICINE | Facility: OTHER | Age: 32
End: 2022-09-08
Attending: STUDENT IN AN ORGANIZED HEALTH CARE EDUCATION/TRAINING PROGRAM
Payer: COMMERCIAL

## 2022-09-08 DIAGNOSIS — Z30.42 ENCOUNTER FOR DEPO-PROVERA CONTRACEPTION: Primary | ICD-10-CM

## 2022-09-08 LAB — HCG UR QL: NEGATIVE

## 2022-09-08 PROCEDURE — 96372 THER/PROPH/DIAG INJ SC/IM: CPT | Performed by: STUDENT IN AN ORGANIZED HEALTH CARE EDUCATION/TRAINING PROGRAM

## 2022-09-08 PROCEDURE — 81025 URINE PREGNANCY TEST: CPT | Mod: ZL

## 2022-09-08 PROCEDURE — 250N000011 HC RX IP 250 OP 636: Performed by: STUDENT IN AN ORGANIZED HEALTH CARE EDUCATION/TRAINING PROGRAM

## 2022-09-08 RX ADMIN — MEDROXYPROGESTERONE ACETATE 150 MG: 150 INJECTION, SUSPENSION INTRAMUSCULAR at 15:46

## 2022-09-08 NOTE — PROGRESS NOTES
LAST PAP/EXAM:   Lab Results   Component Value Date    PAP NIL 07/01/2021     URINE HCG:negative    The following medication was given:     MEDICATION: Depo Provera 150mg  ROUTE: IM  SITE: Deltoid - Right  LOT #: [id number]  EXP:01/01/2024  NEXT INJECTION DUE: 11/24/22 - 12/8/22   Provider: Dr. Mare Newton, RN on 9/8/2022 at 3:51 PM

## 2022-12-06 ENCOUNTER — ALLIED HEALTH/NURSE VISIT (OUTPATIENT)
Dept: FAMILY MEDICINE | Facility: OTHER | Age: 32
End: 2022-12-06
Attending: STUDENT IN AN ORGANIZED HEALTH CARE EDUCATION/TRAINING PROGRAM
Payer: COMMERCIAL

## 2022-12-06 DIAGNOSIS — Z30.42 ENCOUNTER FOR DEPO-PROVERA CONTRACEPTION: Primary | ICD-10-CM

## 2022-12-06 PROCEDURE — 96372 THER/PROPH/DIAG INJ SC/IM: CPT | Performed by: STUDENT IN AN ORGANIZED HEALTH CARE EDUCATION/TRAINING PROGRAM

## 2022-12-06 PROCEDURE — 250N000011 HC RX IP 250 OP 636: Performed by: STUDENT IN AN ORGANIZED HEALTH CARE EDUCATION/TRAINING PROGRAM

## 2022-12-06 RX ADMIN — MEDROXYPROGESTERONE ACETATE 150 MG: 150 INJECTION, SUSPENSION INTRAMUSCULAR at 10:53

## 2022-12-06 NOTE — PROGRESS NOTES
LAST PAP/EXAM:   Lab Results   Component Value Date    PAP NIL 07/01/2021     URINE HCG:not indicated    The following medication was given:     MEDICATION: Depo Provera 150mg  ROUTE: IM  SITE: Deltoid - Right  LOT #: [id number]  EXP:04/2024  NEXT INJECTION DUE: 2/21/23 - 3/7/23   Provider: MELYSSA AN RN on 12/6/2022 at 10:54 AM

## 2022-12-26 ENCOUNTER — HEALTH MAINTENANCE LETTER (OUTPATIENT)
Age: 32
End: 2022-12-26

## 2023-03-15 ENCOUNTER — ALLIED HEALTH/NURSE VISIT (OUTPATIENT)
Dept: FAMILY MEDICINE | Facility: OTHER | Age: 33
End: 2023-03-15
Attending: STUDENT IN AN ORGANIZED HEALTH CARE EDUCATION/TRAINING PROGRAM
Payer: MEDICAID

## 2023-03-15 VITALS — BODY MASS INDEX: 20.7 KG/M2 | WEIGHT: 120.6 LBS

## 2023-03-15 DIAGNOSIS — Z30.42 ENCOUNTER FOR DEPO-PROVERA CONTRACEPTION: Primary | ICD-10-CM

## 2023-03-15 LAB — HCG UR QL: NEGATIVE

## 2023-03-15 PROCEDURE — 250N000011 HC RX IP 250 OP 636: Performed by: STUDENT IN AN ORGANIZED HEALTH CARE EDUCATION/TRAINING PROGRAM

## 2023-03-15 PROCEDURE — G0463 HOSPITAL OUTPT CLINIC VISIT: HCPCS | Mod: 25

## 2023-03-15 PROCEDURE — 81025 URINE PREGNANCY TEST: CPT | Mod: ZL

## 2023-03-15 PROCEDURE — 96372 THER/PROPH/DIAG INJ SC/IM: CPT | Performed by: STUDENT IN AN ORGANIZED HEALTH CARE EDUCATION/TRAINING PROGRAM

## 2023-03-15 RX ADMIN — MEDROXYPROGESTERONE ACETATE 150 MG: 150 INJECTION, SUSPENSION INTRAMUSCULAR at 13:13

## 2023-07-15 ENCOUNTER — HEALTH MAINTENANCE LETTER (OUTPATIENT)
Age: 33
End: 2023-07-15

## 2023-07-24 ENCOUNTER — ALLIED HEALTH/NURSE VISIT (OUTPATIENT)
Dept: FAMILY MEDICINE | Facility: OTHER | Age: 33
End: 2023-07-24
Attending: STUDENT IN AN ORGANIZED HEALTH CARE EDUCATION/TRAINING PROGRAM
Payer: COMMERCIAL

## 2023-07-24 VITALS — BODY MASS INDEX: 20.25 KG/M2 | WEIGHT: 118 LBS

## 2023-07-24 DIAGNOSIS — Z30.42 ENCOUNTER FOR DEPO-PROVERA CONTRACEPTION: Primary | ICD-10-CM

## 2023-07-24 LAB — HCG UR QL: NEGATIVE

## 2023-07-24 PROCEDURE — 250N000011 HC RX IP 250 OP 636: Mod: JZ | Performed by: STUDENT IN AN ORGANIZED HEALTH CARE EDUCATION/TRAINING PROGRAM

## 2023-07-24 PROCEDURE — 96372 THER/PROPH/DIAG INJ SC/IM: CPT | Performed by: STUDENT IN AN ORGANIZED HEALTH CARE EDUCATION/TRAINING PROGRAM

## 2023-07-24 PROCEDURE — 81025 URINE PREGNANCY TEST: CPT | Mod: ZL

## 2023-07-24 RX ADMIN — MEDROXYPROGESTERONE ACETATE 150 MG: 150 INJECTION, SUSPENSION INTRAMUSCULAR at 14:56

## 2023-07-24 NOTE — PROGRESS NOTES
Clinic Administered Medication Documentation    Depo Provera Documentation    Depo-Provera Standing Order inclusion/exclusion criteria reviewed.    Is this the initial or subsequent dose of Depo Provera? Subsequent dose - patient is not within the acceptable window of time (11-15 weeks) for subsequent injection. Pregnancy test is indicated. Pregnancy test result: negative     Patient meets: inclusion criteria     Is there an active order (written within the past 365 days, with administrations remaining, not ) in the chart? Yes.     Prior to injection, verified patient identity using patient's name and date of birth. Medication was administered. Please see MAR and medication order for additional information.     Vial/Syringe: Single dose vial. Was entire vial of medication used? Yes    Patient instructed to remain in clinic for 15 minutes and report any adverse reaction to staff immediately but patient declined.  NEXT INJECTION DUE: 10/9/23 - 23    Verified that the patient has refills remaining in their prescription.    MARIA ESTHER KRISHNAN RN on 2023 at 2:58 PM

## 2023-10-31 ENCOUNTER — ALLIED HEALTH/NURSE VISIT (OUTPATIENT)
Dept: FAMILY MEDICINE | Facility: OTHER | Age: 33
End: 2023-10-31
Attending: STUDENT IN AN ORGANIZED HEALTH CARE EDUCATION/TRAINING PROGRAM
Payer: COMMERCIAL

## 2023-10-31 DIAGNOSIS — Z30.42 ENCOUNTER FOR DEPO-PROVERA CONTRACEPTION: Primary | ICD-10-CM

## 2023-10-31 DIAGNOSIS — Z30.42 ENCOUNTER FOR SURVEILLANCE OF INJECTABLE CONTRACEPTIVE: ICD-10-CM

## 2023-10-31 PROCEDURE — 96372 THER/PROPH/DIAG INJ SC/IM: CPT | Performed by: PHYSICIAN ASSISTANT

## 2023-10-31 PROCEDURE — 250N000011 HC RX IP 250 OP 636: Mod: JZ | Performed by: PHYSICIAN ASSISTANT

## 2023-10-31 RX ORDER — MEDROXYPROGESTERONE ACETATE 150 MG/ML
150 INJECTION, SUSPENSION INTRAMUSCULAR ONCE
Status: COMPLETED | OUTPATIENT
Start: 2023-10-31 | End: 2023-10-31

## 2023-10-31 RX ADMIN — MEDROXYPROGESTERONE ACETATE 150 MG: 150 INJECTION, SUSPENSION INTRAMUSCULAR at 08:41

## 2023-10-31 NOTE — PROGRESS NOTES
Clinic Administered Medication Documentation      Depo Provera Documentation    Depo-Provera Standing Order inclusion/exclusion criteria reviewed. {  Is this the initial or subsequent dose of Depo Provera? Subsequent dose - patient is within the acceptable window of time (11-15 weeks) for subsequent injection. Pregnancy test not indicated.    Patient meets: inclusion criteria     Is there an active order (written within the past 365 days, with administrations remaining, not ) in the chart? Yes.     Prior to injection, verified patient identity using patient's name and date of birth. Medication was administered. Please see MAR and medication order for additional information.     Vial/Syringe: Single dose vial. Was entire vial of medication used? Yes    Patient instructed to remain in clinic for 15 minutes and report any adverse reaction to staff immediately.  NEXT INJECTION DUE: 24 - 24    Patient has no refills remaining.  Reminbed patient to schedule an appointment with PCP

## 2023-10-31 NOTE — PROGRESS NOTES
Patient scheduled for depo injection this morning. Order is . One time dose pended for review, routing to covering provider as PCP is out of office today. Will remind patient to schedule with PCP.      Dilia Lopez RN on 10/31/2023 at 7:48 AM

## 2023-11-27 ENCOUNTER — OFFICE VISIT (OUTPATIENT)
Dept: FAMILY MEDICINE | Facility: OTHER | Age: 33
End: 2023-11-27
Attending: STUDENT IN AN ORGANIZED HEALTH CARE EDUCATION/TRAINING PROGRAM
Payer: COMMERCIAL

## 2023-11-27 VITALS
RESPIRATION RATE: 20 BRPM | WEIGHT: 119.13 LBS | HEART RATE: 94 BPM | HEIGHT: 64 IN | SYSTOLIC BLOOD PRESSURE: 106 MMHG | DIASTOLIC BLOOD PRESSURE: 70 MMHG | BODY MASS INDEX: 20.34 KG/M2 | TEMPERATURE: 98.5 F | OXYGEN SATURATION: 98 %

## 2023-11-27 DIAGNOSIS — M76.32 IT BAND SYNDROME, LEFT: ICD-10-CM

## 2023-11-27 DIAGNOSIS — Z00.00 ROUTINE GENERAL MEDICAL EXAMINATION AT A HEALTH CARE FACILITY: Primary | ICD-10-CM

## 2023-11-27 DIAGNOSIS — M70.62 TROCHANTERIC BURSITIS OF LEFT HIP: ICD-10-CM

## 2023-11-27 DIAGNOSIS — N63.14 MASS OF LOWER INNER QUADRANT OF RIGHT BREAST: ICD-10-CM

## 2023-11-27 LAB
ALBUMIN SERPL BCG-MCNC: 4.7 G/DL (ref 3.5–5.2)
ALP SERPL-CCNC: 102 U/L (ref 40–150)
ALT SERPL W P-5'-P-CCNC: 11 U/L (ref 0–50)
ANION GAP SERPL CALCULATED.3IONS-SCNC: 10 MMOL/L (ref 7–15)
AST SERPL W P-5'-P-CCNC: 15 U/L (ref 0–45)
BASOPHILS # BLD AUTO: 0.1 10E3/UL (ref 0–0.2)
BASOPHILS NFR BLD AUTO: 1 %
BILIRUB SERPL-MCNC: 0.3 MG/DL
BUN SERPL-MCNC: 8.1 MG/DL (ref 6–20)
CALCIUM SERPL-MCNC: 9.8 MG/DL (ref 8.6–10)
CHLORIDE SERPL-SCNC: 106 MMOL/L (ref 98–107)
CREAT SERPL-MCNC: 0.81 MG/DL (ref 0.51–0.95)
DEPRECATED HCO3 PLAS-SCNC: 25 MMOL/L (ref 22–29)
EGFRCR SERPLBLD CKD-EPI 2021: >90 ML/MIN/1.73M2
EOSINOPHIL # BLD AUTO: 0.3 10E3/UL (ref 0–0.7)
EOSINOPHIL NFR BLD AUTO: 3 %
ERYTHROCYTE [DISTWIDTH] IN BLOOD BY AUTOMATED COUNT: 12.3 % (ref 10–15)
GLUCOSE SERPL-MCNC: 98 MG/DL (ref 70–99)
HCT VFR BLD AUTO: 45.2 % (ref 35–47)
HGB BLD-MCNC: 15.5 G/DL (ref 11.7–15.7)
IMM GRANULOCYTES # BLD: 0 10E3/UL
IMM GRANULOCYTES NFR BLD: 0 %
LYMPHOCYTES # BLD AUTO: 2.1 10E3/UL (ref 0.8–5.3)
LYMPHOCYTES NFR BLD AUTO: 26 %
MCH RBC QN AUTO: 30 PG (ref 26.5–33)
MCHC RBC AUTO-ENTMCNC: 34.3 G/DL (ref 31.5–36.5)
MCV RBC AUTO: 88 FL (ref 78–100)
MONOCYTES # BLD AUTO: 0.5 10E3/UL (ref 0–1.3)
MONOCYTES NFR BLD AUTO: 6 %
NEUTROPHILS # BLD AUTO: 5 10E3/UL (ref 1.6–8.3)
NEUTROPHILS NFR BLD AUTO: 64 %
NRBC # BLD AUTO: 0 10E3/UL
NRBC BLD AUTO-RTO: 0 /100
PLATELET # BLD AUTO: 211 10E3/UL (ref 150–450)
POTASSIUM SERPL-SCNC: 4.1 MMOL/L (ref 3.4–5.3)
PROT SERPL-MCNC: 7.7 G/DL (ref 6.4–8.3)
RBC # BLD AUTO: 5.16 10E6/UL (ref 3.8–5.2)
SODIUM SERPL-SCNC: 141 MMOL/L (ref 135–145)
WBC # BLD AUTO: 7.9 10E3/UL (ref 4–11)

## 2023-11-27 PROCEDURE — 80053 COMPREHEN METABOLIC PANEL: CPT | Mod: ZL | Performed by: STUDENT IN AN ORGANIZED HEALTH CARE EDUCATION/TRAINING PROGRAM

## 2023-11-27 PROCEDURE — 36415 COLL VENOUS BLD VENIPUNCTURE: CPT | Mod: ZL | Performed by: STUDENT IN AN ORGANIZED HEALTH CARE EDUCATION/TRAINING PROGRAM

## 2023-11-27 PROCEDURE — G0463 HOSPITAL OUTPT CLINIC VISIT: HCPCS

## 2023-11-27 PROCEDURE — 85025 COMPLETE CBC W/AUTO DIFF WBC: CPT | Mod: ZL | Performed by: STUDENT IN AN ORGANIZED HEALTH CARE EDUCATION/TRAINING PROGRAM

## 2023-11-27 PROCEDURE — 99213 OFFICE O/P EST LOW 20 MIN: CPT | Mod: 25 | Performed by: STUDENT IN AN ORGANIZED HEALTH CARE EDUCATION/TRAINING PROGRAM

## 2023-11-27 PROCEDURE — 99395 PREV VISIT EST AGE 18-39: CPT | Performed by: STUDENT IN AN ORGANIZED HEALTH CARE EDUCATION/TRAINING PROGRAM

## 2023-11-27 ASSESSMENT — ENCOUNTER SYMPTOMS
ARTHRALGIAS: 1
DIZZINESS: 0
HEMATURIA: 0
FREQUENCY: 0
HEADACHES: 1
HEARTBURN: 0
SHORTNESS OF BREATH: 0
HEMATOCHEZIA: 0
NAUSEA: 0
JOINT SWELLING: 0
DYSURIA: 0
MYALGIAS: 1
DIARRHEA: 0
WEAKNESS: 1
PALPITATIONS: 0
CHILLS: 0
EYE PAIN: 0
PARESTHESIAS: 0
FEVER: 0
SORE THROAT: 0
BREAST MASS: 0
CONSTIPATION: 0
ABDOMINAL PAIN: 0
COUGH: 0
NERVOUS/ANXIOUS: 1

## 2023-11-27 ASSESSMENT — ANXIETY QUESTIONNAIRES
GAD7 TOTAL SCORE: 13
GAD7 TOTAL SCORE: 13
2. NOT BEING ABLE TO STOP OR CONTROL WORRYING: NEARLY EVERY DAY
7. FEELING AFRAID AS IF SOMETHING AWFUL MIGHT HAPPEN: MORE THAN HALF THE DAYS
3. WORRYING TOO MUCH ABOUT DIFFERENT THINGS: NEARLY EVERY DAY
6. BECOMING EASILY ANNOYED OR IRRITABLE: NOT AT ALL
1. FEELING NERVOUS, ANXIOUS, OR ON EDGE: NEARLY EVERY DAY
5. BEING SO RESTLESS THAT IT IS HARD TO SIT STILL: SEVERAL DAYS
IF YOU CHECKED OFF ANY PROBLEMS ON THIS QUESTIONNAIRE, HOW DIFFICULT HAVE THESE PROBLEMS MADE IT FOR YOU TO DO YOUR WORK, TAKE CARE OF THINGS AT HOME, OR GET ALONG WITH OTHER PEOPLE: NOT DIFFICULT AT ALL
4. TROUBLE RELAXING: SEVERAL DAYS

## 2023-11-27 ASSESSMENT — PAIN SCALES - GENERAL: PAINLEVEL: MODERATE PAIN (5)

## 2023-11-27 ASSESSMENT — PATIENT HEALTH QUESTIONNAIRE - PHQ9
10. IF YOU CHECKED OFF ANY PROBLEMS, HOW DIFFICULT HAVE THESE PROBLEMS MADE IT FOR YOU TO DO YOUR WORK, TAKE CARE OF THINGS AT HOME, OR GET ALONG WITH OTHER PEOPLE: SOMEWHAT DIFFICULT
SUM OF ALL RESPONSES TO PHQ QUESTIONS 1-9: 6
SUM OF ALL RESPONSES TO PHQ QUESTIONS 1-9: 6

## 2023-11-27 NOTE — PROGRESS NOTES
SUBJECTIVE:   Priscilla is a 33 year old, presenting for the following:  Physical (Annual exam)        Healthy Habits:     Getting at least 3 servings of Calcium per day:  Yes    Bi-annual eye exam:  NO    Dental care twice a year:  NO    Sleep apnea or symptoms of sleep apnea:  None    Diet:  Regular (no restrictions)    Frequency of exercise:  2-3 days/week    Duration of exercise:  30-45 minutes    Taking medications regularly:  Yes    Medication side effects:  None    Additional concerns today:  Yes  Answers submitted by the patient for this visit:  Patient Health Questionnaire (Submitted on 11/27/2023)  If you checked off any problems, how difficult have these problems made it for you to do your work, take care of things at home, or get along with other people?: Somewhat difficult  PHQ9 TOTAL SCORE: 6  JASON-7 (Submitted on 11/27/2023)  JASON 7 TOTAL SCORE: 13    Left leg  - hurts to walk, laying on it  - pain on left outer side, into knee and ankle   - no trauma or new exercises   - taking ibuprofen  - getting worse   - SAHM, doing stretches at home     Breast lump  - right breast lump  - noticed as she has been weaning BF. Does not feel like clogged duct  - history of fibrocystic breasts  - this one is tender and getting bigger    Today's PHQ-9 Score:       11/27/2023    10:39 AM   PHQ-9 SCORE   PHQ-9 Total Score Keishahart 6 (Mild depression)   PHQ-9 Total Score 6                 -------------------------------------      Social History     Tobacco Use    Smoking status: Light Smoker     Packs/day: 0.25     Years: 1.50     Additional pack years: 0.00     Total pack years: 0.38     Types: Cigarettes    Smokeless tobacco: Never    Tobacco comments:     Quit smoking: cutting  back   Substance Use Topics    Alcohol use: No     Alcohol/week: 0.0 standard drinks of alcohol     Comment: Alcoholic Drinks/day: occasionally             11/27/2023    10:45 AM   Alcohol Use   Prescreen: >3 drinks/day or >7 drinks/week? No      Reviewed orders with patient.  Reviewed health maintenance and updated orders accordingly - Yes  Lab work is in process    Breast Cancer Screenin/27/2023    10:46 AM   Breast CA Risk Assessment (FHS-7)   Do you have a family history of breast, colon, or ovarian cancer? No / Unknown         Patient under 40 years of age: Routine Mammogram Screening not recommended.   Pertinent mammograms are reviewed under the imaging tab.    History of abnormal Pap smear: Last 3 Pap and HPV Results:       Latest Ref Rng & Units 2021     8:57 AM 2021    11:34 AM 2011     3:24 PM   PAP / HPV   PAP (Historical)   NIL  NIL    HPV 16 DNA NEG^Negative Negative      HPV 18 DNA NEG^Negative Negative      Other HR HPV NEG^Negative Negative            Latest Ref Rng & Units 2021     8:57 AM 2021    11:34 AM 2011     3:24 PM   PAP / HPV   PAP (Historical)   NIL  NIL    HPV 16 DNA NEG^Negative Negative      HPV 18 DNA NEG^Negative Negative      Other HR HPV NEG^Negative Negative        Reviewed and updated as needed this visit by clinical staff   Tobacco  Allergies  Meds    Surg Hx           Reviewed and updated as needed this visit by Provider   Tobacco      Surg Hx              Review of Systems   Constitutional:  Negative for chills and fever.   HENT:  Negative for congestion, ear pain, hearing loss and sore throat.    Eyes:  Negative for pain and visual disturbance.   Respiratory:  Negative for cough and shortness of breath.    Cardiovascular:  Negative for chest pain, palpitations and peripheral edema.   Gastrointestinal:  Negative for abdominal pain, constipation, diarrhea, heartburn, hematochezia and nausea.   Breasts:  Negative for tenderness, breast mass and discharge.   Genitourinary:  Negative for dysuria, frequency, genital sores, hematuria, pelvic pain, urgency, vaginal bleeding and vaginal discharge.   Musculoskeletal:  Positive for arthralgias and myalgias. Negative for joint  "swelling.   Skin:  Negative for rash.   Neurological:  Positive for weakness and headaches. Negative for dizziness and paresthesias.   Psychiatric/Behavioral:  Negative for mood changes. The patient is nervous/anxious.           OBJECTIVE:   /70 (BP Location: Right arm, Patient Position: Sitting, Cuff Size: Adult Regular)   Pulse 94   Temp 98.5  F (36.9  C) (Tympanic)   Resp 20   Ht 1.626 m (5' 4\")   Wt 54 kg (119 lb 2 oz)   SpO2 98%   BMI 20.45 kg/m    Physical Exam  GENERAL: healthy, alert and no distress  EYES: Eyes grossly normal to inspection, PERRL and conjunctivae and sclerae normal  HENT: ear canals and TM's normal, nose and mouth without ulcers or lesions  RESP: lungs clear to auscultation - no rales, rhonchi or wheezes  BREAST: right breast lower inner quadrant tender mass   CV: regular rate and rhythm, normal S1 S2, no S3 or S4, no murmur, click or rub, no peripheral edema and peripheral pulses strong  MS: tenderness to palpation along left trochanteric bursa, at IT band insertion on left   SKIN: no suspicious lesions or rashes  PSYCH: mentation appears normal, affect normal/bright    Diagnostic Test Results:  Labs reviewed in Epic    ASSESSMENT/PLAN:   Priscilla was seen today for physical.    Diagnoses and all orders for this visit:    Routine general medical examination at a health care facility  UTD on pap   -     CBC and Differential; Future  -     Comprehensive Metabolic Panel; Future  -     Comprehensive Metabolic Panel  -     CBC and Differential    Trochanteric bursitis of left hip  It band syndrome, left  History and exam consistent with likely trochanteric bursitis and IT band syndrome. SAHM so hard to get in to PT, given exercises to do at home       Mass of lower inner quadrant of right breast  Could be her fibrocystic breasts. Does not sound like clogged duct or mastitis but possible. Given it is enlarging, will get US. I am unable to see records of previous breast mammo and US from " when she lived in [facility] Breast Right Limited 1-3 Quadrants; Future              COUNSELING:  Reviewed preventive health counseling, as reflected in patient instructions        She reports that she has been smoking cigarettes. She has a 0.38 pack-year smoking history. She has never used smokeless tobacco.  Nicotine/Tobacco Cessation Plan:   Information offered: Patient not interested at this time          Florencia Bennett MD  [facility] AND [facility]

## 2023-11-27 NOTE — PATIENT INSTRUCTIONS
Need for Home Care Service was communicated by Cece Bryant on 3/22/22. Choice was offered and patient chose [facility]. Due to isolation status called into patients room, phone rang without answer, will continue to attempt to reach. Added [facility] contact information to patients AVS.    Risk of readmission is : HIGH 63% (based on Longitudinal Plan of Care score)    Liaison will continue to follow until discharge. Anticipated dc date is today 3/22/22.     1330 Addendum:  Second attempt to reach patient unsuccessful, pt is noted to have discharged.     Merna Frazier RN/Home Care Liaison  [phone] Preventive Health Recommendations  Female Ages 26 - 39  Yearly exam:   See your health care provider every year in order to  Review health changes.   Discuss preventive care.    Review your medicines if you your doctor has prescribed any.    Until age 30: Get a Pap test every three years (more often if you have had an abnormal result).    After age 30: Talk to your doctor about whether you should have a Pap test every 3 years or have a Pap test with HPV screening every 5 years.   You do not need a Pap test if your uterus was removed (hysterectomy) and you have not had cancer.  You should be tested each year for STDs (sexually transmitted diseases), if you're at risk.   Talk to your provider about how often to have your cholesterol checked.  If you are at risk for diabetes, you should have a diabetes test (fasting glucose).  Shots: Get a flu shot each year. Get a tetanus shot every 10 years.   Nutrition:   Eat at least 5 servings of fruits and vegetables each day.  Eat whole-grain bread, whole-wheat pasta and brown rice instead of white grains and rice.  Get adequate Calcium and Vitamin D.     Lifestyle  Exercise at least 150 minutes a week (30 minutes a day, 5 days of the week). This will help you control your weight and prevent disease.  Limit alcohol to one drink per day.  No smoking.   Wear sunscreen to prevent skin cancer.  See your dentist every six months for an exam and cleaning.

## 2023-11-27 NOTE — NURSING NOTE
"Medication Reconciliation: Complete    Kimberly Alva LPN  11/27/2023 10:49 AM  Chief Complaint   Patient presents with    Physical     Annual exam       Initial /70 (BP Location: Right arm, Patient Position: Sitting, Cuff Size: Adult Regular)   Pulse 94   Temp 98.5  F (36.9  C) (Tympanic)   Resp 20   Ht 1.626 m (5' 4\")   Wt 54 kg (119 lb 2 oz)   SpO2 98%   BMI 20.45 kg/m   Estimated body mass index is 20.45 kg/m  as calculated from the following:    Height as of this encounter: 1.626 m (5' 4\").    Weight as of this encounter: 54 kg (119 lb 2 oz).  Medication Review: complete    The next two questions are to help us understand your food security.  If you are feeling you need any assistance in this area, we have resources available to support you today.          11/27/2023   SDOH- Food Insecurity   Within the past 12 months, did you worry that your food would run out before you got money to buy more? N   Within the past 12 months, did the food you bought just not last and you didn t have money to get more? N         Health Care Directive:  Patient does not have a Health Care Directive or Living Will: Discussed advance care planning with patient; however, patient declined at this time.    Kimberly Alva LPN      "

## 2023-12-18 ENCOUNTER — HOSPITAL ENCOUNTER (OUTPATIENT)
Dept: ULTRASOUND IMAGING | Facility: OTHER | Age: 33
Discharge: HOME | End: 2023-12-18
Attending: STUDENT IN AN ORGANIZED HEALTH CARE EDUCATION/TRAINING PROGRAM | Admitting: STUDENT IN AN ORGANIZED HEALTH CARE EDUCATION/TRAINING PROGRAM
Payer: COMMERCIAL

## 2023-12-18 DIAGNOSIS — N63.14 MASS OF LOWER INNER QUADRANT OF RIGHT BREAST: ICD-10-CM

## 2023-12-18 PROCEDURE — 76642 ULTRASOUND BREAST LIMITED: CPT | Mod: RT

## 2024-07-09 ENCOUNTER — ALLIED HEALTH/NURSE VISIT (OUTPATIENT)
Dept: FAMILY MEDICINE | Facility: OTHER | Age: 34
End: 2024-07-09
Payer: COMMERCIAL

## 2024-07-09 DIAGNOSIS — Z30.42 ENCOUNTER FOR DEPO-PROVERA CONTRACEPTION: Primary | ICD-10-CM

## 2024-07-09 LAB — HCG UR QL: NEGATIVE

## 2024-07-09 PROCEDURE — 81025 URINE PREGNANCY TEST: CPT | Mod: ZL

## 2024-07-09 PROCEDURE — 96372 THER/PROPH/DIAG INJ SC/IM: CPT | Performed by: PHYSICIAN ASSISTANT

## 2024-07-09 PROCEDURE — 250N000011 HC RX IP 250 OP 636: Mod: JZ | Performed by: PHYSICIAN ASSISTANT

## 2024-07-09 RX ORDER — MEDROXYPROGESTERONE ACETATE 150 MG/ML
150 INJECTION, SUSPENSION INTRAMUSCULAR ONCE
Status: COMPLETED | OUTPATIENT
Start: 2024-07-09 | End: 2024-07-09

## 2024-07-09 RX ADMIN — MEDROXYPROGESTERONE ACETATE 150 MG: 150 INJECTION, SUSPENSION INTRAMUSCULAR at 10:25

## 2024-07-09 NOTE — PROGRESS NOTES
Clinic Administered Medication Documentation      Depo Provera Documentation    Depo-Provera Standing Order inclusion/exclusion criteria reviewed.     Is this the initial or subsequent dose of Depo Provera? Subsequent dose - patient is not within the acceptable window of time (11-15 weeks) for subsequent injection. Pregnancy test is indicated. Pregnancy test result: negative       Patient meets: inclusion criteria     Is there an active order (written within the past 365 days, with administrations remaining, not ) in the chart? No. Order was obtained from provider in clinic.     Prior to injection, verified patient identity using patient's name and date of birth. Medication was administered. Please see MAR and medication order for additional information.     Vial/Syringe: Single dose vial. Was entire vial of medication used? Yes    Patient instructed to remain in clinic for 15 minutes and report any adverse reaction to staff immediately but patient declined.  NEXT INJECTION DUE: 24 - 10/22/24    Patient has no refills remaining. Refill encounter opened, order pended and Routed to the provider    Dilia Lopez RN on 2024 at 10:18 AM

## 2024-07-10 RX ORDER — MEDROXYPROGESTERONE ACETATE 150 MG/ML
150 INJECTION, SUSPENSION INTRAMUSCULAR
Status: ACTIVE | OUTPATIENT
Start: 2024-07-10 | End: 2025-01-06

## 2024-07-17 ENCOUNTER — HOSPITAL ENCOUNTER (OUTPATIENT)
Dept: GENERAL RADIOLOGY | Facility: OTHER | Age: 34
Discharge: HOME | End: 2024-07-17
Attending: STUDENT IN AN ORGANIZED HEALTH CARE EDUCATION/TRAINING PROGRAM
Payer: COMMERCIAL

## 2024-07-17 ENCOUNTER — OFFICE VISIT (OUTPATIENT)
Dept: FAMILY MEDICINE | Facility: OTHER | Age: 34
End: 2024-07-17
Attending: PHYSICIAN ASSISTANT
Payer: COMMERCIAL

## 2024-07-17 VITALS
SYSTOLIC BLOOD PRESSURE: 136 MMHG | DIASTOLIC BLOOD PRESSURE: 66 MMHG | TEMPERATURE: 98.5 F | BODY MASS INDEX: 20.14 KG/M2 | HEART RATE: 92 BPM | WEIGHT: 118 LBS | HEIGHT: 64 IN | RESPIRATION RATE: 16 BRPM

## 2024-07-17 DIAGNOSIS — M25.552 HIP PAIN, LEFT: ICD-10-CM

## 2024-07-17 DIAGNOSIS — M25.552 HIP PAIN, LEFT: Primary | ICD-10-CM

## 2024-07-17 DIAGNOSIS — R20.0 LEFT LEG NUMBNESS: ICD-10-CM

## 2024-07-17 DIAGNOSIS — M25.859 FEMORAL ACETABULAR IMPINGEMENT: ICD-10-CM

## 2024-07-17 PROCEDURE — 96372 THER/PROPH/DIAG INJ SC/IM: CPT | Performed by: STUDENT IN AN ORGANIZED HEALTH CARE EDUCATION/TRAINING PROGRAM

## 2024-07-17 PROCEDURE — 250N000011 HC RX IP 250 OP 636: Mod: JZ | Performed by: STUDENT IN AN ORGANIZED HEALTH CARE EDUCATION/TRAINING PROGRAM

## 2024-07-17 PROCEDURE — G0463 HOSPITAL OUTPT CLINIC VISIT: HCPCS

## 2024-07-17 PROCEDURE — 73502 X-RAY EXAM HIP UNI 2-3 VIEWS: CPT

## 2024-07-17 PROCEDURE — 72100 X-RAY EXAM L-S SPINE 2/3 VWS: CPT

## 2024-07-17 PROCEDURE — 99214 OFFICE O/P EST MOD 30 MIN: CPT | Performed by: STUDENT IN AN ORGANIZED HEALTH CARE EDUCATION/TRAINING PROGRAM

## 2024-07-17 RX ORDER — PREDNISONE 20 MG/1
40 TABLET ORAL DAILY
Qty: 10 TABLET | Refills: 0 | Status: SHIPPED | OUTPATIENT
Start: 2024-07-17 | End: 2024-07-22

## 2024-07-17 RX ORDER — KETOROLAC TROMETHAMINE 30 MG/ML
30 INJECTION, SOLUTION INTRAMUSCULAR; INTRAVENOUS ONCE
Status: COMPLETED | OUTPATIENT
Start: 2024-07-17 | End: 2024-07-17

## 2024-07-17 RX ADMIN — KETOROLAC TROMETHAMINE 30 MG: 30 INJECTION, SOLUTION INTRAMUSCULAR at 20:00

## 2024-07-17 ASSESSMENT — PAIN SCALES - GENERAL: PAINLEVEL: EXTREME PAIN (9)

## 2024-07-17 NOTE — NURSING NOTE
"Patient presents to the clinic for left hip/back/knee and thigh pain today.  Denies any trauma, does have a history of left hip bursitis.    FOOD SECURITY SCREENING QUESTIONS:    The next two questions are to help us understand your food security.  If you are feeling you need any assistance in this area, we have resources available to support you today.    Hunger Vital Signs:  Within the past 12 months we worried whether our food would run out before we got money to buy more. Never  Within the past 12 months the food we bought just didn't last and we didn't have money to get more. Never      Chief Complaint   Patient presents with    Musculoskeletal Problem       Initial /66 (BP Location: Left arm, Patient Position: Sitting, Cuff Size: Adult Regular)   Pulse 92   Temp 98.5  F (36.9  C) (Tympanic)   Resp 16   Ht 1.626 m (5' 4\")   Wt 53.5 kg (118 lb)   Breastfeeding No   BMI 20.25 kg/m   Estimated body mass index is 20.25 kg/m  as calculated from the following:    Height as of this encounter: 1.626 m (5' 4\").    Weight as of this encounter: 53.5 kg (118 lb).  Medication Reconciliation: complete        Felicitas Loza LPN    "

## 2024-07-18 NOTE — PATIENT INSTRUCTIONS
Left-sided back/hip pain    Consider nerve involvement versus worsening bursitis.    Recommend prednisone 40 mg once a day to help with inflammation.  Take in the morning with food.  Utilize Tylenol as needed 500 to 1000 mg every 6 hours for pain.    Activity as tolerated.    Follow-up with neurology for EMG to determine further nerve function.  Follow-up with PCP for persisting symptoms.    [facility]: [phone].    Return to rapid clinic or ER if symptoms worsen or change.

## 2024-07-18 NOTE — PROGRESS NOTES
Assessment & Plan     (M25.252) Hip pain, left  (primary encounter diagnosis)    Comment: Left hip pain.  X-ray imaging was completed today.  Possible femoral acetabular impingement.  This would describe her persistent symptoms despite her generalized exercises.  She does have some numbness diffusely in the foot, no evidence of vascular compromise at this time.    Plan: XR Lumbar Spine 2/3 Views, Physical Therapy          Referral, ketorolac (TORADOL)         injection 30 mg, predniSONE (DELTASONE) 20 MG         tablet, Orthopedic  Referral,         CANCELED: XR Pelvis and Hip Left 1 View          Toradol was given in the office for relief this evening.  Short course of prednisone, 40 mg once a day for 5 days.  Follow-up with orthopedics.  Return to [facility] clinic or ER if symptoms worsen or change.    (M25.000) Femoral acetabular impingement    Comment: Possible femoral acetabular impingement.  Per x-ray imaging.    Plan: Orthopedic  Referral          Recommend follow-up with orthopedics.    (R20.0) Left leg numbness  Comment: consider related to hip.  Also consider further nerve damage/irritation.  MRI was completed in 2022 without any obvious impingement.  Plan: Adult Neurology  Referral, ketorolac         (TORADOL) injection 30 mg, predniSONE         (DELTASONE) 20 MG tablet          Discussed with patient on 7/18/2024 that she should follow-up with orthopedics first.  If further evaluation of the nerves is necessary, consider neurology referral for possible EMG.  She is comfortable with this plan.        Harlan Wells is a 33 year old, presenting for the following health issues:  Musculoskeletal Problem    HPI     Patient presents today with a 6-month history of left hip pain.  She states today at work pain had gotten significantly worse up into her back as well as down into her knee and then down into her foot.  She notes some numbness diffusely across the foot and up  "into the lower leg.  She notes she was not doing anything unusual, but she does stand frequently at work to do her job duties.  She does take Tylenol and ibuprofen.  She has also been doing stretching with minimal relief.       Review of Systems  Constitutional, HEENT, cardiovascular, pulmonary, gi and gu systems are negative, except as otherwise noted.        Objective    /66 (BP Location: Left arm, Patient Position: Sitting, Cuff Size: Adult Regular)   Pulse 92   Temp 98.5  F (36.9  C) (Tympanic)   Resp 16   Ht 1.626 m (5' 4\")   Wt 53.5 kg (118 lb)   Breastfeeding No   BMI 20.25 kg/m    Body mass index is 20.25 kg/m .    Physical Exam   GENERAL: alert and no distress  NECK: no adenopathy, no asymmetry, masses, or scars  RESP: lungs clear to auscultation - no rales, rhonchi or wheezes  CV: regular rate and rhythm, normal S1 S2  ABDOMEN: soft, nontender, no hepatosplenomegaly, no masses and bowel sounds normal  MS: no gross musculoskeletal defects noted, no edema, erythema, ecchymosis, nontender to palpation over the back, lateral hip, knee, ankle, calf, hamstring.  Decreased range of motion with movement of the hip due to pain, distal pulses intact, skin color symmetric, skin warm and dry, sensation dulled but still present on the entirety of the leg    Results for orders placed or performed during the hospital encounter of 07/17/24   XR Pelvis w Hip Left G/E 2 Views     Status: None    Narrative    XR PELVIS AND HIP LEFT 2 VIEWS    HISTORY: 33 years Female pain in left pelvic/hip; Hip pain, left    COMPARISON: None    TECHNIQUE: 3 views pelvis and left    FINDINGS: The sacral iliac joints and pubic symphysis are congruent.  The left hip is congruent. There is no evidence of fracture or  dislocation. There is mild joint space narrowing and marginal  osteophytic change of the superior femoral head neck junction. This  can predispose to cam-type femoral acetabular impingement.      Impression    " IMPRESSION: Mild osteoarthritic change of the left hip, consider  possibility of femoral acetabular impingement.    No acute changes. No evidence of fracture.    MICHEL TRIPATHI MD         SYSTEM ID:  [id number]   Results for orders placed or performed during the hospital encounter of 07/17/24   XR Lumbar Spine 2/3 Views     Status: None    Narrative    XR LUMBAR SPINE 2/3 VIEWS    HISTORY: 33 years Female pain in lumbar back/hip; Hip pain, left    COMPARISON: None    TECHNIQUE: 3 views lumbar spine    FINDINGS: Alignment is normal. Vertebral body height is maintained  throughout. There is no evidence of subluxation or fracture.      Impression    IMPRESSION: No evidence of fracture or subluxation of the lumbar  spine.    MICHEL TRIPATHI MD         SYSTEM ID:  [id number]         Signed Electronically by: Adtii Butcher PA-C

## 2024-08-14 ENCOUNTER — OFFICE VISIT (OUTPATIENT)
Dept: ORTHOPEDICS | Facility: OTHER | Age: 34
End: 2024-08-14
Attending: STUDENT IN AN ORGANIZED HEALTH CARE EDUCATION/TRAINING PROGRAM
Payer: COMMERCIAL

## 2024-08-14 VITALS — WEIGHT: 112 LBS | HEART RATE: 78 BPM | BODY MASS INDEX: 19.22 KG/M2 | OXYGEN SATURATION: 98 %

## 2024-08-14 DIAGNOSIS — M79.605 LEFT LEG PAIN: Primary | ICD-10-CM

## 2024-08-14 DIAGNOSIS — M25.859 FEMORAL ACETABULAR IMPINGEMENT: ICD-10-CM

## 2024-08-14 DIAGNOSIS — M25.552 HIP PAIN, LEFT: ICD-10-CM

## 2024-08-14 PROCEDURE — 99203 OFFICE O/P NEW LOW 30 MIN: CPT | Performed by: ORTHOPAEDIC SURGERY

## 2024-08-14 PROCEDURE — G0463 HOSPITAL OUTPT CLINIC VISIT: HCPCS

## 2024-08-14 ASSESSMENT — PAIN SCALES - GENERAL: PAINLEVEL: MODERATE PAIN (5)

## 2024-08-14 NOTE — PROGRESS NOTES
Surgical Clinic Consult  Primary physician:     Florencia Guerrero    Chief complaint:   Left leg pain    History of present illness:  This is a 34 year old female I am seeing in consultation for chronic left leg pain ongoing for least a year in duration.  In fact 2 years as well.  Patient has pain that does run up and down her leg.  Discomfort on the lateral aspect.  Patient tried a brief course of therapy previously.  She is here to consider neck steps and options has some therapy set up at this point.  Did have an MRI scan before which is inconclusive on the lumbar spine.  X-rays of both the lumbar and the left hip reveal only mild left hip impingement she denies any major injuries to that hip.  She is not having any significant groin pain as far as the hip is concerned.  Some of her discomfort is along the lateral aspect of the hip but also does travel down the leg.    Past medical history:   Past Medical History:   Diagnosis Date    Anxiety disorder     No Comments Provided    Congenital cerebral cysts (H)     patient reported    Endometriosis     No Comments Provided       Pastsurgical history:  Past Surgical History:   Procedure Laterality Date    LAPAROSCOPY DIAGNOSTIC (GYN)      2009,pelvic endometriosis       Current medications:  Current Outpatient Medications   Medication Sig Dispense Refill    diclofenac (VOLTAREN) 1 % topical gel Apply 2 g topically 4 times daily as needed for moderate pain 150 g 0    naproxen (NAPROSYN) 500 MG tablet Take 1 tablet (500 mg) by mouth 2 times daily (with meals) 90 tablet 3       Allergies:  Allergies   Allergen Reactions    Latex Other (See Comments) and Rash     Local reaction of irritation/numbness    Coconut [Coconut Oil] Hives and Difficulty breathing    Aspirin Hives and Other (See Comments)     Other reaction(s): Bleeding  Family disorder of bleeding so she does not use    Coconut (Cocos Nucifera) Rash     Other reaction(s): Angioedema       Family  history:  Family History   Problem Relation Age of Onset    Thyroid Disease Mother     Myocardial Infarction Father     Alpha-1 antitrypsin deficiency Sister     Alcoholism Sister     Liver Disease Sister     Cervical Cancer Sister        Social history:  Social History     Socioeconomic History    Marital status: Single     Spouse name: engaged    Number of children: Not on file    Years of education: <[employer] grad    Highest education level: Not on file   Occupational History    Not on file   Tobacco Use    Smoking status: Light Smoker     Current packs/day: 0.25     Average packs/day: 0.3 packs/day for 1.5 years (0.4 ttl pk-yrs)     Types: Cigarettes    Smokeless tobacco: Never    Tobacco comments:     Quit smoking: cutting  back   Vaping Use    Vaping status: Never Used   Substance and Sexual Activity    Alcohol use: No     Alcohol/week: 0.0 standard drinks of alcohol     Comment: Alcoholic Drinks/day: occasionally    Drug use: Not Currently     Types: Marijuana     Comment: several times per week    Sexual activity: Yes     Partners: Male   Other Topics Concern    Not on file   Social History Narrative    Partner- Adolfo Esparza Jamie, [place]- 4 kids    She smokes less than one pack of cigarettes a day.     Social Determinants of Health     Financial Resource Strain: Low Risk  (11/27/2023)    Financial Resource Strain     Within the past 12 months, have you or your family members you live with been unable to get utilities (heat, electricity) when it was really needed?: No   Food Insecurity: Low Risk  (11/27/2023)    Food Insecurity     Within the past 12 months, did you worry that your food would run out before you got money to buy more?: No     Within the past 12 months, did the food you bought just not last and you didn t have money to get more?: No   Transportation Needs: Low Risk  (11/27/2023)    Transportation Needs     Within the past 12 months, has lack of transportation kept you from medical  appointments, getting your medicines, non-medical meetings or appointments, work, or from getting things that you need?: No   Physical Activity: Not on file   Stress: Not on file   Social Connections: Not on file   Interpersonal Safety: Low Risk  (7/17/2024)    Interpersonal Safety     Do you feel physically and emotionally safe where you currently live?: Yes     Within the past 12 months, have you been hit, slapped, kicked or otherwise physically hurt by someone?: No     Within the past 12 months, have you been humiliated or emotionally abused in other ways by your partner or ex-partner?: No   Housing Stability: Low Risk  (11/27/2023)    Housing Stability     Do you have housing? : Yes     Are you worried about losing your housing?: No       PROBLEM LIST:  Patient Active Problem List   Diagnosis    Disorder of ovary    Colloid cyst of third ventricle (H)    Need for Tdap vaccination    Rh negative status during pregnancy    Supervision of normal first pregnancy    Marijuana use    Nausea    Rh negative state in antepartum period    SGA (small for gestational age)    Encounter for triage in pregnant patient    Normal labor    Chronic midline low back pain with bilateral sciatica    JASON (generalized anxiety disorder)    PTSD (post-traumatic stress disorder)       Review of Systems:  COMPLETE 12 point REVIEW OF SYSTEMS is otherwise negative with the exception of which is stated above.    Physical exam: Pulse 78   Wt 50.8 kg (112 lb)   SpO2 98%   BMI 19.22 kg/m      General: this is a pleasant female patient in no acute distress.  Patient is awake alert and oriented x3 .   EXAM:  Chest/Respiratory Exam: Normal - Clear to auscultation without rales, rhonchi, or wheezing.  Cardiovascular Exam: normal  Musculoskeletal: Lower back examination shows pain with forward flexion.  No discomfort with hyperextension.  Straight leg raise is more painful in the left side versus the right.  Discomfort across the bursa is noted.   No hip impingement signs present on current clinical examination.  ERWIN testing is otherwise negative.  Neuroexam is intact.    Imagin views left hip as well as lumbar spine review no significant arthritic changes.  Adequate over alignment present.    Assessment:   Left leg pain likely secondary to lumbar disc herniation.  Left hip bursitis    Plan:    MRI lumbar spines and recommended.  Patient will be contacted once complete.  Agree with EMG testing left lower extremity.  Physical therapy also be of some value.  Patient be contacted once MRI lumbar is complete.      Tru Vazquez MD

## 2024-08-21 ENCOUNTER — HOSPITAL ENCOUNTER (OUTPATIENT)
Dept: MRI IMAGING | Facility: OTHER | Age: 34
Discharge: HOME | End: 2024-08-21
Attending: ORTHOPAEDIC SURGERY | Admitting: ORTHOPAEDIC SURGERY
Payer: COMMERCIAL

## 2024-08-21 DIAGNOSIS — M79.605 LEFT LEG PAIN: ICD-10-CM

## 2024-08-21 PROCEDURE — 72148 MRI LUMBAR SPINE W/O DYE: CPT

## 2024-10-13 ENCOUNTER — APPOINTMENT (OUTPATIENT)
Dept: CT IMAGING | Facility: OTHER | Age: 34
End: 2024-10-13
Payer: COMMERCIAL

## 2024-10-13 ENCOUNTER — HOSPITAL ENCOUNTER (EMERGENCY)
Facility: OTHER | Age: 34
Discharge: HOME | End: 2024-10-13
Payer: COMMERCIAL

## 2024-10-13 VITALS
RESPIRATION RATE: 18 BRPM | SYSTOLIC BLOOD PRESSURE: 133 MMHG | HEART RATE: 84 BPM | OXYGEN SATURATION: 100 % | DIASTOLIC BLOOD PRESSURE: 80 MMHG | TEMPERATURE: 98.4 F

## 2024-10-13 DIAGNOSIS — H53.9 VISION CHANGES: ICD-10-CM

## 2024-10-13 DIAGNOSIS — R51.9 HEADACHE: ICD-10-CM

## 2024-10-13 LAB
ALBUMIN SERPL BCG-MCNC: 4.6 G/DL (ref 3.5–5.2)
ALP SERPL-CCNC: 68 U/L (ref 40–150)
ALT SERPL W P-5'-P-CCNC: 11 U/L (ref 0–50)
ANION GAP SERPL CALCULATED.3IONS-SCNC: 8 MMOL/L (ref 7–15)
AST SERPL W P-5'-P-CCNC: 17 U/L (ref 0–45)
BASOPHILS # BLD AUTO: 0.1 10E3/UL (ref 0–0.2)
BASOPHILS NFR BLD AUTO: 1 %
BILIRUB SERPL-MCNC: 0.4 MG/DL
BUN SERPL-MCNC: 9.4 MG/DL (ref 6–20)
CALCIUM SERPL-MCNC: 9.2 MG/DL (ref 8.8–10.4)
CHLORIDE SERPL-SCNC: 107 MMOL/L (ref 98–107)
CREAT SERPL-MCNC: 0.78 MG/DL (ref 0.51–0.95)
CRP SERPL-MCNC: <3 MG/L
EGFRCR SERPLBLD CKD-EPI 2021: >90 ML/MIN/1.73M2
EOSINOPHIL # BLD AUTO: 0.1 10E3/UL (ref 0–0.7)
EOSINOPHIL NFR BLD AUTO: 2 %
ERYTHROCYTE [DISTWIDTH] IN BLOOD BY AUTOMATED COUNT: 12.5 % (ref 10–15)
ERYTHROCYTE [SEDIMENTATION RATE] IN BLOOD BY WESTERGREN METHOD: 1 MM/HR (ref 0–20)
GLUCOSE SERPL-MCNC: 90 MG/DL (ref 70–99)
HCO3 SERPL-SCNC: 26 MMOL/L (ref 22–29)
HCT VFR BLD AUTO: 40.7 % (ref 35–47)
HGB BLD-MCNC: 14 G/DL (ref 11.7–15.7)
IMM GRANULOCYTES # BLD: 0 10E3/UL
IMM GRANULOCYTES NFR BLD: 0 %
LYMPHOCYTES # BLD AUTO: 1.5 10E3/UL (ref 0.8–5.3)
LYMPHOCYTES NFR BLD AUTO: 24 %
MCH RBC QN AUTO: 30.7 PG (ref 26.5–33)
MCHC RBC AUTO-ENTMCNC: 34.4 G/DL (ref 31.5–36.5)
MCV RBC AUTO: 89 FL (ref 78–100)
MONOCYTES # BLD AUTO: 0.4 10E3/UL (ref 0–1.3)
MONOCYTES NFR BLD AUTO: 7 %
NEUTROPHILS # BLD AUTO: 4.1 10E3/UL (ref 1.6–8.3)
NEUTROPHILS NFR BLD AUTO: 67 %
NRBC # BLD AUTO: 0 10E3/UL
NRBC BLD AUTO-RTO: 0 /100
PLATELET # BLD AUTO: 197 10E3/UL (ref 150–450)
POTASSIUM SERPL-SCNC: 3.6 MMOL/L (ref 3.4–5.3)
PROT SERPL-MCNC: 7 G/DL (ref 6.4–8.3)
RBC # BLD AUTO: 4.56 10E6/UL (ref 3.8–5.2)
SODIUM SERPL-SCNC: 141 MMOL/L (ref 135–145)
WBC # BLD AUTO: 6.2 10E3/UL (ref 4–11)

## 2024-10-13 PROCEDURE — 250N000009 HC RX 250

## 2024-10-13 PROCEDURE — 85004 AUTOMATED DIFF WBC COUNT: CPT

## 2024-10-13 PROCEDURE — 70498 CT ANGIOGRAPHY NECK: CPT

## 2024-10-13 PROCEDURE — 99284 EMERGENCY DEPT VISIT MOD MDM: CPT

## 2024-10-13 PROCEDURE — 86140 C-REACTIVE PROTEIN: CPT

## 2024-10-13 PROCEDURE — 250N000013 HC RX MED GY IP 250 OP 250 PS 637

## 2024-10-13 PROCEDURE — 70450 CT HEAD/BRAIN W/O DYE: CPT

## 2024-10-13 PROCEDURE — 82310 ASSAY OF CALCIUM: CPT

## 2024-10-13 PROCEDURE — 85018 HEMOGLOBIN: CPT

## 2024-10-13 PROCEDURE — 85652 RBC SED RATE AUTOMATED: CPT

## 2024-10-13 PROCEDURE — 96374 THER/PROPH/DIAG INJ IV PUSH: CPT

## 2024-10-13 PROCEDURE — 99285 EMERGENCY DEPT VISIT HI MDM: CPT | Mod: 25

## 2024-10-13 PROCEDURE — 36415 COLL VENOUS BLD VENIPUNCTURE: CPT

## 2024-10-13 PROCEDURE — 250N000011 HC RX IP 250 OP 636

## 2024-10-13 PROCEDURE — 70496 CT ANGIOGRAPHY HEAD: CPT

## 2024-10-13 PROCEDURE — 99207 PR NO CHARGE LOS: CPT | Performed by: PSYCHIATRY & NEUROLOGY

## 2024-10-13 RX ORDER — ASPIRIN 81 MG/1
81 TABLET ORAL DAILY
Qty: 7 TABLET | Refills: 0 | Status: SHIPPED | OUTPATIENT
Start: 2024-10-13 | End: 2024-10-13

## 2024-10-13 RX ORDER — KETOROLAC TROMETHAMINE 15 MG/ML
15 INJECTION, SOLUTION INTRAMUSCULAR; INTRAVENOUS ONCE
Status: COMPLETED | OUTPATIENT
Start: 2024-10-13 | End: 2024-10-13

## 2024-10-13 RX ORDER — ASPIRIN 81 MG/1
81 TABLET, CHEWABLE ORAL DAILY
Qty: 7 TABLET | Refills: 0 | Status: SHIPPED | OUTPATIENT
Start: 2024-10-13 | End: 2024-10-20

## 2024-10-13 RX ORDER — IOPAMIDOL 755 MG/ML
75 INJECTION, SOLUTION INTRAVASCULAR ONCE
Status: COMPLETED | OUTPATIENT
Start: 2024-10-13 | End: 2024-10-13

## 2024-10-13 RX ORDER — ASPIRIN 325 MG
325 TABLET ORAL ONCE
Status: COMPLETED | OUTPATIENT
Start: 2024-10-13 | End: 2024-10-13

## 2024-10-13 RX ADMIN — IOPAMIDOL 75 ML: 755 INJECTION, SOLUTION INTRAVENOUS at 19:43

## 2024-10-13 RX ADMIN — SODIUM CHLORIDE 70 ML: 9 INJECTION, SOLUTION INTRAVENOUS at 19:43

## 2024-10-13 RX ADMIN — KETOROLAC TROMETHAMINE 15 MG: 15 INJECTION, SOLUTION INTRAMUSCULAR; INTRAVENOUS at 18:19

## 2024-10-13 RX ADMIN — ASPIRIN 325 MG: 325 TABLET ORAL at 21:45

## 2024-10-13 ASSESSMENT — ACTIVITIES OF DAILY LIVING (ADL)
ADLS_ACUITY_SCORE: 35

## 2024-10-13 ASSESSMENT — VISUAL ACUITY
OD: 20/25
OU: 1
OS: 20/25

## 2024-10-13 ASSESSMENT — COLUMBIA-SUICIDE SEVERITY RATING SCALE - C-SSRS
2. HAVE YOU ACTUALLY HAD ANY THOUGHTS OF KILLING YOURSELF IN THE PAST MONTH?: NO
1. IN THE PAST MONTH, HAVE YOU WISHED YOU WERE DEAD OR WISHED YOU COULD GO TO SLEEP AND NOT WAKE UP?: NO
6. HAVE YOU EVER DONE ANYTHING, STARTED TO DO ANYTHING, OR PREPARED TO DO ANYTHING TO END YOUR LIFE?: NO

## 2024-10-13 ASSESSMENT — TONOMETRY
OS_IOP_MMHG: 24
OD_IOP_MMHG: 14
IOP_HANDHELD: 1

## 2024-10-13 ASSESSMENT — ENCOUNTER SYMPTOMS
NUMBNESS: 1
HEADACHES: 1

## 2024-10-13 NOTE — Clinical Note
Priscilla Garcia was seen and treated in our emergency department on 10/13/2024.  She may return to work on 10/15/2024.       If you have any questions or concerns, please don't hesitate to call.      Amber Pérez, MONSERRAT CNP

## 2024-10-13 NOTE — ED PROVIDER NOTES
History     Chief Complaint   Patient presents with    Eye Problem    Headache     The history is provided by the patient and medical records.     Priscilla Garcia is a 34 year old female who presents to the emergency department today with significant other.  Patient reports that last night around 730 she had a sudden flash by her left eye experienced pain behind her left eye and total loss of vision in her eye.  She reports this lasted about a half an hour and then her vision became cloudy in the eye.  She tells me that her left pupil got bigger than her right when she looked in the mirror.  She also has what she describes as decreased sensation to the left side of her face like when she has a panic attack when she is hyperventilating.  Her pointer finger and right thumb also are feeling unusual as well with decreased sensation.  Today she continues to have a sharp headache by her left eye.  She has not taken anything for the pain.  She was describing her symptoms to a coworker today who advised her to come in for evaluation.  Patient reports that she does have headaches off and on but does not have a history of migraines.  Denies any recent illness.  Denies any recent trauma or head injury.    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      Allergies:  Allergies   Allergen Reactions    Latex Other (See Comments) and Rash     Local reaction of irritation/numbness    Coconut [Coconut Oil] Hives and Difficulty breathing    Aspirin Hives and Other (See Comments)     Other reaction(s): Bleeding  Family disorder of bleeding so she does not use    Coconut (Cocos Nucifera) Rash     Other reaction(s): Angioedema       Problem List:    Patient Active Problem List    Diagnosis Date Noted    Chronic midline low back pain with bilateral sciatica 06/20/2022     Priority: Medium    JASON (generalized anxiety disorder) 06/20/2022      Priority: Medium    PTSD (post-traumatic stress disorder) 06/20/2022     Priority: Medium    Normal labor 01/17/2022     Priority: Medium    Encounter for triage in pregnant patient 10/09/2021     Priority: Medium    Marijuana use 07/29/2015     Priority: Medium    Rh negative state in antepartum period 07/28/2015     Priority: Medium    SGA (small for gestational age) 07/28/2015     Priority: Medium    Nausea 01/21/2015     Priority: Medium    Supervision of normal first pregnancy 10/17/2012     Priority: Medium    Rh negative status during pregnancy 09/13/2012     Priority: Medium    Need for Tdap vaccination 09/12/2012     Priority: Medium     Tdap after 20 weeks gestation      Colloid cyst of third ventricle (H) 06/09/2011     Priority: Medium     There is a small, 7 mm area of high signal intensity seen on sagittal midline images in the anterior aspect of the third ventricle.A followup study in 6-9 months could be performed to confirm that this finding remains stable        Disorder of ovary 01/22/2009     Priority: Medium     2009 January          Past Medical History:    Past Medical History:   Diagnosis Date    Anxiety disorder     Congenital cerebral cysts (H)     Endometriosis        Past Surgical History:    Past Surgical History:   Procedure Laterality Date    LAPAROSCOPY DIAGNOSTIC (GYN)      2009,pelvic endometriosis       Family History:    Family History   Problem Relation Age of Onset    Thyroid Disease Mother     Myocardial Infarction Father     Alpha-1 antitrypsin deficiency Sister     Alcoholism Sister     Liver Disease Sister     Cervical Cancer Sister        Social History:  Marital Status:  Single [1]  Social History     Tobacco Use    Smoking status: Light Smoker     Current packs/day: 0.25     Average packs/day: 0.3 packs/day for 1.5 years (0.4 ttl pk-yrs)     Types: Cigarettes    Smokeless tobacco: Never    Tobacco comments:     Quit smoking: cutting  back   Vaping Use    Vaping status:  Never Used   Substance Use Topics    Alcohol use: No     Alcohol/week: 0.0 standard drinks of alcohol     Comment: Alcoholic Drinks/day: occasionally    Drug use: Not Currently     Types: Marijuana     Comment: several times per week        Medications:    diclofenac (VOLTAREN) 1 % topical gel  naproxen (NAPROSYN) 500 MG tablet        Review of Systems   Eyes:  Positive for visual disturbance.   Neurological:  Positive for numbness and headaches.   All other systems reviewed and are negative.  See HPI    Physical Exam   BP: 133/80  Pulse: 84  Temp: 98.4  F (36.9  C)  Resp: 18  SpO2: 100 %      Physical Exam  Vitals and nursing note reviewed.   Constitutional:       General: She is not in acute distress.     Appearance: She is not ill-appearing or toxic-appearing.   HENT:      Head: Normocephalic.      Right Ear: Tympanic membrane normal.      Left Ear: Tympanic membrane normal.      Nose: Nose normal.      Mouth/Throat:      Lips: Pink.      Mouth: Mucous membranes are moist.   Eyes:      General: Lids are normal. Vision grossly intact. No visual field deficit.        Left eye: No foreign body or discharge.      Intraocular pressure: Right eye pressure is 14 mmHg. Left eye pressure is 24 mmHg. Measurements were taken using a handheld tonometer.     Extraocular Movements: Extraocular movements intact.      Right eye: No nystagmus.      Left eye: No nystagmus.      Conjunctiva/sclera: Conjunctivae normal.      Pupils: Pupils are equal, round, and reactive to light.      Right eye: Pupil is round and reactive.      Left eye: Pupil is round and reactive.      Funduscopic exam:     Right eye: Red reflex present.         Left eye: Red reflex present.     Visual Fields: Right eye visual fields normal and left eye visual fields normal.      Comments: Left pupil is slightly larger than right pupil   Cardiovascular:      Rate and Rhythm: Normal rate and regular rhythm.      Pulses: Normal pulses.   Pulmonary:      Effort:  Pulmonary effort is normal.      Breath sounds: Normal breath sounds.   Abdominal:      General: Abdomen is flat.      Palpations: Abdomen is soft.   Musculoskeletal:         General: Normal range of motion.      Cervical back: Full passive range of motion without pain and neck supple.   Skin:     General: Skin is warm.      Capillary Refill: Capillary refill takes less than 2 seconds.   Neurological:      Mental Status: She is alert.      GCS: GCS eye subscore is 4. GCS verbal subscore is 5. GCS motor subscore is 6.      Motor: Motor function is intact.      Coordination: Coordination is intact.      Comments: Reports decreased sensation to left cheek, left pointer/thumb; she does have sensation when I examine her today   Psychiatric:         Mood and Affect: Mood normal.         Behavior: Behavior normal. Behavior is cooperative.         ED Course         Results for orders placed or performed during the hospital encounter of 10/13/24 (from the past 24 hour(s))   CBC with platelets differential    Narrative    The following orders were created for panel order CBC with platelets differential.  Procedure                               Abnormality         Status                     ---------                               -----------         ------                     CBC with platelets and d...[[id number]]                      Final result                 Please view results for these tests on the individual orders.   CRP inflammation   Result Value Ref Range    CRP Inflammation <3.00 <5.00 mg/L   Erythrocyte sedimentation rate auto   Result Value Ref Range    Erythrocyte Sedimentation Rate 1 0 - 20 mm/hr   Comprehensive metabolic panel   Result Value Ref Range    Sodium 141 135 - 145 mmol/L    Potassium 3.6 3.4 - 5.3 mmol/L    Carbon Dioxide (CO2) 26 22 - 29 mmol/L    Anion Gap 8 7 - 15 mmol/L    Urea Nitrogen 9.4 6.0 - 20.0 mg/dL    Creatinine 0.78 0.51 - 0.95 mg/dL    GFR Estimate >90 >60 mL/min/1.73m2    Calcium  9.2 8.8 - 10.4 mg/dL    Chloride 107 98 - 107 mmol/L    Glucose 90 70 - 99 mg/dL    Alkaline Phosphatase 68 40 - 150 U/L    AST 17 0 - 45 U/L    ALT 11 0 - 50 U/L    Protein Total 7.0 6.4 - 8.3 g/dL    Albumin 4.6 3.5 - 5.2 g/dL    Bilirubin Total 0.4 <=1.2 mg/dL   CBC with platelets and differential   Result Value Ref Range    WBC Count 6.2 4.0 - 11.0 10e3/uL    RBC Count 4.56 3.80 - 5.20 10e6/uL    Hemoglobin 14.0 11.7 - 15.7 g/dL    Hematocrit 40.7 35.0 - 47.0 %    MCV 89 78 - 100 fL    MCH 30.7 26.5 - 33.0 pg    MCHC 34.4 31.5 - 36.5 g/dL    RDW 12.5 10.0 - 15.0 %    Platelet Count 197 150 - 450 10e3/uL    % Neutrophils 67 %    % Lymphocytes 24 %    % Monocytes 7 %    % Eosinophils 2 %    % Basophils 1 %    % Immature Granulocytes 0 %    NRBCs per 100 WBC 0 <1 /100    Absolute Neutrophils 4.1 1.6 - 8.3 10e3/uL    Absolute Lymphocytes 1.5 0.8 - 5.3 10e3/uL    Absolute Monocytes 0.4 0.0 - 1.3 10e3/uL    Absolute Eosinophils 0.1 0.0 - 0.7 10e3/uL    Absolute Basophils 0.1 0.0 - 0.2 10e3/uL    Absolute Immature Granulocytes 0.0 <=0.4 10e3/uL    Absolute NRBCs 0.0 10e3/uL   CT Head w/o Contrast    Narrative    PROCEDURE: CT HEAD W/O CONTRAST     HISTORY: headache, vision changes.    COMPARISON: None.    TECHNIQUE:  Helical images of the head from the foramen magnum to the  vertex were obtained without contrast. This CT exam was performed  using one or more the following dose reduction techniques: automated  exposure control, adjustment of the mA and/or kV according to patient  size, and/or iterative reconstruction technique.    FINDINGS: The ventricles and sulci are normal in volume. No acute  intracranial hemorrhage, mass effect, midline shift, hydrocephalus or  basilar cystern effacement are present.    The grey-white matter interface is preserved.    The calvarium is intact. The mastoid air cells are clear.  The  visualized paranasal sinuses are clear.      Impression    IMPRESSION: No acute intracranial  hemorrhage or CT evidence of acute  transcortical ischemia.      MATTHEW CALDERA MD         SYSTEM ID:  [id number]   CTA Head Neck with Contrast    Narrative    CT ANGIOGRAPHY OF THE BRAIN AND NECK    HISTORY: . headache, vision changes.    TECHNIQUE: Following the administration of intravenous contrast, thin  helical CT angiography images of the brain were obtained.   Postcontrast helical thin CT angiography images of the neck were  obtained.  NASCET criteria were applied. Source, multiplanar and 3D  MIP reformatted images were reviewed.  This CT exam was performed  using one or more the following dose reduction techniques: automated  exposure control, adjustment of the mA and/or kV according to patient  size, and/or iterative reconstruction technique.    COMPARISON: None.    FINDINGS:    CTA Brain:      The petrous, cavernous, and supraclinoid internal carotid arteries are  patent.    The A1 segments are symmetric. An anterior communicating artery is  present. The distal EMMA vessels are unremarkable. The M1 segments, MCA  bifurcations and distal MCA vessels are patent.    The basilar and vertebral arteries are patent. The PCA and SCA  branches demonstrate no focal abnormalities.      CTA Neck:     A 3 vessel aortic arch is present. The innominate and bilateral  subclavian arteries are grossly patent.    The common carotid arteries demonstrate preserved caliber. The carotid  bulbs demonstrate no measurable stenosis. The bilateral internal  carotid arteries demonstrate no evidence of flow-limiting stenosis or  occlusion.    The vertebral arteries arise from the subclavian arteries. There is no  evidence of flow-limiting stenosis or occlusion of the vertebral  arteries.    No mass or pneumothorax is seen at the apices. Multilevel degenerative  changes are seen in the cervical spine.      Impression    IMPRESSION:    No intracranial arterial occlusion, flow-limiting stenosis or  aneurysm.    No evidence of  flow-limiting stenosis, dissection, or occlusion of the  cervical carotid or vertebral arteries.      MATTHEW CALDERA MD         SYSTEM ID:  [id number]       Medications   ketorolac (TORADOL) injection 15 mg (15 mg Intravenous $Given 10/13/24 1819)   sodium chloride 0.9 % bag 500 mL for CT scan flush use (70 mLs As instructed $Given 10/13/24 1943)   iopamidol (ISOVUE-370) solution 75 mL (75 mLs Intravenous $Given 10/13/24 1943)       Assessments & Plan (with Medical Decision Making)  She is vitally stable. She is nontoxic. Non-distressed but tearful, anxious.   Objective eye exam is unremarkable-PERRLA, normal EOM bilaterally. Vision intact bilaterally. bilateral vision acuity 20/25   Subjective left eye exam today she is reporting vision clouding. Peripheral vision and central vision intact. Left facial sensation change, left pointer finger and thumb sensation change  Differential diagnosis include: Migraine with aura, optic neuritis- MS, glaucoma- eye pressures 24 (L) 14(R) with handheld tonometer, temporal arteritis- ESR, CRP normal, no facial nerve tenderness.   Retinal artery occlusion- has no risk factors; less likely given history with flash or lights, headache; CVA  Retina detachment considered- Dr. Price performed bedside ultrasound of left eye without evidence of retina detachment   Other intercranial pathology: has history of small colloid cyst on MRI 2011  Labs & Radiology results interpreted by radiologist:   ED Course as of 10/13/24 2121   [place] Oct 13, 2024   1735 CBC with platelets differential  normal   1739 Erythrocyte sedimentation rate auto  normal   1739 CBC with platelets differential  normal   1800 CRP inflammation  normal   2024 CTA Head Neck with Contrast  No intracranial arterial occlusion, flow-limiting stenosis or  aneurysm.     No evidence of flow-limiting stenosis, dissection, or occlusion of the  cervical carotid or vertebral arteries     2024 CT Head w/o Contrast  IMPRESSION: No  "acute intracranial hemorrhage or CT evidence of acute  transcortical ischemia        Meds: Toradol given   Spoke with Radiologist on call- Recommended MR of brain for optic neuritis evaluation, which we do not have capability of doing here today.   7:08 PM Patient is reporting some relief from headache, vision changes after toradol. Decreased Sensation to face/right fingers remains the same  7:34 PM Consulted with Stroke Neurology, Dr. Dunlap- please see his note for reference. With patient's symptoms, there is a concern for a condition called Cheiro-oral syndrome. He recommended a CT/CTA; MR/MRA (not available tonight). Patient accepting to plan.   8:35 PM Patient has no new neurological symptoms. Continues to report some facial numbness/ finger numbness though is somewhat improving.   9:21 PM Discussed CT results with DR. Lipscomb, stroke neurologist. He recommended full dose ASA now, 81 mg daily;  though it is very unlikely symptoms are related to a stroke however,  Patient still warrants evaluation with MR/MRA urgently-reasonable to do outpatient to fully rule it out. I spoke with patient and she would like to discharge home. I discussed the recommendations from neurology with her. She does not have a primary care provider that she regularly follows, thus I did place and MRI/MRA order tonight per neurology recommendations and scheduled her an appointment with Dr. Albania Bennett who she has seen in the past. I did send a note to her as well.   Patient reports \"allergy\" to aspirin   She discharged home in stable condition, strict return precautions for new/worsening symptoms.      I have reviewed the nursing notes.    I have reviewed the findings, diagnosis, plan and need for follow up with the patient.    Medical Decision Making  The patient's presentation was of high complexity (neurological deficits).    The patient's evaluation involved:  review of external note(s) from 1 sources (see separate area of note for " details)  review of 1 test result(s) ordered prior to this encounter (see separate area of note for details)  ordering and/or review of 3+ test(s) in this encounter (see separate area of note for details)  discussion of management or test interpretation with another health professional (see separate area of note for details)    The patient's management necessitated moderate risk (prescription drug management including medications given in the ED) and moderate risk (IV contrast administration).        New Prescriptions    No medications on file       Final diagnoses:   Headache   Vision changes       10/13/2024   [facility], APRN CNP  10/13/24 7121

## 2024-10-13 NOTE — ED TRIAGE NOTES
"  Pt arrives to ER via private vehicle with c/o \"cloudy vision\" in her L eye, headache and tingling on her L cheek and L thumb and forefinger. pt states she feels like her \"brain is cloudy.\" Pt reports that sx started around 1930 last night. Pt denies N/V.    Triage Assessment (Adult)       Row Name 10/13/24 1638 10/13/24 1636       Triage Assessment    Airway WDL -- WDL       Respiratory WDL    Respiratory WDL -- WDL       Skin Circulation/Temperature WDL    Skin Circulation/Temperature WDL -- WDL       Cardiac WDL    Cardiac WDL WDL --       Peripheral/Neurovascular WDL    Peripheral Neurovascular WDL WDL --       Cognitive/Neuro/Behavioral WDL    Cognitive/Neuro/Behavioral WDL WDL --                    "

## 2024-10-14 ENCOUNTER — TELEPHONE (OUTPATIENT)
Dept: FAMILY MEDICINE | Facility: OTHER | Age: 34
End: 2024-10-14
Payer: COMMERCIAL

## 2024-10-14 ENCOUNTER — HOSPITAL ENCOUNTER (OUTPATIENT)
Dept: MRI IMAGING | Facility: OTHER | Age: 34
Discharge: HOME | End: 2024-10-14
Payer: COMMERCIAL

## 2024-10-14 DIAGNOSIS — R51.9 HEADACHE: ICD-10-CM

## 2024-10-14 DIAGNOSIS — H53.9 VISION CHANGES: ICD-10-CM

## 2024-10-14 PROCEDURE — A9575 INJ GADOTERATE MEGLUMI 0.1ML: HCPCS

## 2024-10-14 PROCEDURE — 255N000002 HC RX 255 OP 636

## 2024-10-14 PROCEDURE — 70544 MR ANGIOGRAPHY HEAD W/O DYE: CPT

## 2024-10-14 PROCEDURE — 70553 MRI BRAIN STEM W/O & W/DYE: CPT

## 2024-10-14 RX ORDER — GADOTERATE MEGLUMINE 376.9 MG/ML
15 INJECTION INTRAVENOUS ONCE
Status: COMPLETED | OUTPATIENT
Start: 2024-10-14 | End: 2024-10-14

## 2024-10-14 RX ADMIN — GADOTERATE MEGLUMINE 11 ML: 376.9 INJECTION INTRAVENOUS at 18:29

## 2024-10-14 NOTE — CONSULTS
[facility]    Stroke Telephone Note    I was called by Amber Pérez on 10/13/24 regarding patient Priscilla Garcia. The patient is a 34 year old female with PMHx of anxiety disorder, reported history of colloid cyst, and endometriosis who presents after losing vision in her left eye yesterday for 45 minutes after seeing a flash of light.  Her vision came back and it was cloudy, then she developed numbness in her 1st and 2nd digit and numbness in the left side of her face in her cheek.  Symptoms have improved, but are still persistent.     Not reported to me, but on review of her chart history it appears she has had multiple miscarriages.    Vitals  BP: 133/80   Pulse: 84   Resp: 18   Temp: 98.4  F (36.9  C)        Imaging Findings  CT head: normal  CTA head/neck: normal    Impression  Numbness of left face and 1st/2nd digit of the left hand  Left eye transient vision loss    Recommendations  Unclear etiology for these events, history of miscarriage could suggest possible hypercoagulability, but symptoms would be uncommon for stroke--they would be two separate embolic events: amaurosis fugax of the left eye, and chiral-oral syndrome of the right hemisphere (typically small vessel).  Patient advised that she would like to go home and CT/CTA negative  It would be reasonable to obtain outpatient MRI brain with coronal dwi, with and without contrast   Aspirin 325mg now and 81mg daily until stroke ruled out on MRI  --if negative for stroke, stop Aspirin  --if MRI positive for stroke she should return to the hospital for expedited work-up  --If negative for stroke, but other pathology (such as demyelination) is identified, stop aspirin and refer to Neruology.    My recommendations are based on the information provided over the phone by Priscilla MOTLEY Radha's in-person providers. They are not intended to replace the clinical judgment of her in-person providers. I was not requested to personally see or examine  "the patient at this time.     Royal Dunlap MD, MS  Vascular Neurology    To page me or covering stroke neurology team member, click here: [facility]  Choose \"On Call\" tab at top, then select \"NEUROLOGY/ALL SITES\" from middle drop-down box, press Enter, then look for \"stroke\" or \"telestroke\" for your site.         "

## 2024-10-14 NOTE — TELEPHONE ENCOUNTER
Notified patient she can be seen Wednesday 10/16/24 at 9:00am by Florencia Jones MD.  She accepted this time and assisted in scheduling appointment.  Future office visit rescheduled.    Kimberly Alva LPN............10/14/2024 10:04 AM

## 2024-10-14 NOTE — DISCHARGE INSTRUCTIONS
Priscilla,   You chose not to stay overnight. This is understanding able. Please call Monday and set up your MRI. Order is in. Follow up in the clinic   Return to be seen if new, worsening symptoms here in the ER : new/worsening vision changes, worsening headache, one sided weakness, balance issues, facial droop,   Baby aspirin 81 mg daily.

## 2024-10-14 NOTE — TELEPHONE ENCOUNTER
Reason for call: Patient wanting a work in appointment.    Is the appointment for a Hospital Follow up?  Yes     Patient is having the following symptoms and/or what is the appt for:  ED follow up - follow up on MRI for vision, headaches, and stroke concerns     The patient is requesting an appointment with  [facility]    Was an appointment offered for this call?  Not talking to pt, has appt for 10/24    If Yes, what is the date of the appointment?   10/24    Preferred method for responding to this message: Telephone Call    Phone number patient can be reached at? Cell number on file:    Telephone Information:   Mobile [phone]       If we can't reach you directly, may we leave a detailed response at the number you provided? Yes    Can this message wait until your PCP/provider returns if unavailable today? No

## 2024-10-14 NOTE — TELEPHONE ENCOUNTER
Patient seen in ER yesterday 10/13/24 for headache and vision changes.  MRI imaging appointments scheduled today.  Requesting work in appointment as she has office visit scheduled for 10/24/24.    Kimberly Alva LPN............10/14/2024 9:27 AM

## 2024-10-16 ENCOUNTER — OFFICE VISIT (OUTPATIENT)
Dept: FAMILY MEDICINE | Facility: OTHER | Age: 34
End: 2024-10-16
Attending: STUDENT IN AN ORGANIZED HEALTH CARE EDUCATION/TRAINING PROGRAM
Payer: COMMERCIAL

## 2024-10-16 VITALS
RESPIRATION RATE: 16 BRPM | BODY MASS INDEX: 19.36 KG/M2 | WEIGHT: 113.38 LBS | HEIGHT: 64 IN | DIASTOLIC BLOOD PRESSURE: 58 MMHG | TEMPERATURE: 97.5 F | SYSTOLIC BLOOD PRESSURE: 104 MMHG | OXYGEN SATURATION: 98 % | HEART RATE: 74 BPM

## 2024-10-16 DIAGNOSIS — G43.111 INTRACTABLE MIGRAINE WITH AURA WITH STATUS MIGRAINOSUS: ICD-10-CM

## 2024-10-16 DIAGNOSIS — H53.9 VISION CHANGES: Primary | ICD-10-CM

## 2024-10-16 DIAGNOSIS — F41.1 GAD (GENERALIZED ANXIETY DISORDER): ICD-10-CM

## 2024-10-16 PROCEDURE — 250N000011 HC RX IP 250 OP 636: Mod: JZ | Performed by: STUDENT IN AN ORGANIZED HEALTH CARE EDUCATION/TRAINING PROGRAM

## 2024-10-16 PROCEDURE — G2211 COMPLEX E/M VISIT ADD ON: HCPCS | Performed by: STUDENT IN AN ORGANIZED HEALTH CARE EDUCATION/TRAINING PROGRAM

## 2024-10-16 PROCEDURE — G0463 HOSPITAL OUTPT CLINIC VISIT: HCPCS | Mod: 25

## 2024-10-16 PROCEDURE — 96372 THER/PROPH/DIAG INJ SC/IM: CPT | Performed by: STUDENT IN AN ORGANIZED HEALTH CARE EDUCATION/TRAINING PROGRAM

## 2024-10-16 PROCEDURE — 99214 OFFICE O/P EST MOD 30 MIN: CPT | Performed by: STUDENT IN AN ORGANIZED HEALTH CARE EDUCATION/TRAINING PROGRAM

## 2024-10-16 RX ORDER — VENLAFAXINE HYDROCHLORIDE 37.5 MG/1
37.5 CAPSULE, EXTENDED RELEASE ORAL DAILY
Qty: 30 CAPSULE | Refills: 3 | Status: SHIPPED | OUTPATIENT
Start: 2024-10-16

## 2024-10-16 RX ORDER — SUMATRIPTAN SUCCINATE 25 MG/1
25 TABLET ORAL
Qty: 12 TABLET | Refills: 3 | Status: SHIPPED | OUTPATIENT
Start: 2024-10-16

## 2024-10-16 RX ADMIN — MEDROXYPROGESTERONE ACETATE 150 MG: 150 INJECTION, SUSPENSION INTRAMUSCULAR at 09:37

## 2024-10-16 ASSESSMENT — ANXIETY QUESTIONNAIRES
1. FEELING NERVOUS, ANXIOUS, OR ON EDGE: MORE THAN HALF THE DAYS
7. FEELING AFRAID AS IF SOMETHING AWFUL MIGHT HAPPEN: MORE THAN HALF THE DAYS
GAD7 TOTAL SCORE: 10
3. WORRYING TOO MUCH ABOUT DIFFERENT THINGS: MORE THAN HALF THE DAYS
IF YOU CHECKED OFF ANY PROBLEMS ON THIS QUESTIONNAIRE, HOW DIFFICULT HAVE THESE PROBLEMS MADE IT FOR YOU TO DO YOUR WORK, TAKE CARE OF THINGS AT HOME, OR GET ALONG WITH OTHER PEOPLE: NOT DIFFICULT AT ALL
GAD7 TOTAL SCORE: 10
8. IF YOU CHECKED OFF ANY PROBLEMS, HOW DIFFICULT HAVE THESE MADE IT FOR YOU TO DO YOUR WORK, TAKE CARE OF THINGS AT HOME, OR GET ALONG WITH OTHER PEOPLE?: NOT DIFFICULT AT ALL
5. BEING SO RESTLESS THAT IT IS HARD TO SIT STILL: SEVERAL DAYS
6. BECOMING EASILY ANNOYED OR IRRITABLE: NOT AT ALL
2. NOT BEING ABLE TO STOP OR CONTROL WORRYING: MORE THAN HALF THE DAYS
GAD7 TOTAL SCORE: 10
4. TROUBLE RELAXING: SEVERAL DAYS
7. FEELING AFRAID AS IF SOMETHING AWFUL MIGHT HAPPEN: MORE THAN HALF THE DAYS

## 2024-10-16 ASSESSMENT — PATIENT HEALTH QUESTIONNAIRE - PHQ9
SUM OF ALL RESPONSES TO PHQ QUESTIONS 1-9: 5
10. IF YOU CHECKED OFF ANY PROBLEMS, HOW DIFFICULT HAVE THESE PROBLEMS MADE IT FOR YOU TO DO YOUR WORK, TAKE CARE OF THINGS AT HOME, OR GET ALONG WITH OTHER PEOPLE: NOT DIFFICULT AT ALL
SUM OF ALL RESPONSES TO PHQ QUESTIONS 1-9: 5

## 2024-10-16 ASSESSMENT — PAIN SCALES - GENERAL: PAINLEVEL: MODERATE PAIN (4)

## 2024-10-16 NOTE — PROGRESS NOTES
"  Assessment & Plan   Problem List Items Addressed This Visit          Behavioral    JASON (generalized anxiety disorder)    Relevant Medications    venlafaxine (EFFEXOR XR) 37.5 MG 24 hr capsule     Other Visit Diagnoses       Vision changes    -  Primary    Relevant Orders    Adult Eye  Referral    Intractable migraine with aura with status migrainosus        Relevant Medications    venlafaxine (EFFEXOR XR) 37.5 MG 24 hr capsule    SUMAtriptan (IMITREX) 25 MG tablet    Other Relevant Orders    Adult Eye  Referral           Reviewed all labs and imaging from recent ER visit and MRI  Symptoms sound consistent with likely ocular migraine vs complex migraine with aura  Given her on going anxiety at baseline, effexor would be helpful for mood and prophylaxis of daily headaches. Given Rx for imitrex prn. Still recommend getting an eye exam   Follow up in 2 weeks    The longitudinal plan of care for the diagnosis(es)/condition(s) as documented were addressed during this visit. Due to the added complexity in care, I will continue to support Priscilla in the subsequent management and with ongoing continuity of care.      Harlan Wells is a 34 year old, presenting for the following health issues:  Follow Up ([facility] ER 10/13/24 right temple migraines, blurry, blackout vision disturbances x 5 days)    HPI     ER follow up   - 4 days ago started left corner of eye-- aura started, then pinch in forehead across  - 3 days ago in the ER  - then lost vision in left eye  - left fingers felt tingly   - left cheek tingly  - still has a headache, little \"blip\" in corner of left eye   - tingling went away  - has had panic attacks with bilateral tingling but this was different  - still has headache   - taking aspirin   - has daily headaches, sometimes with loud noises progresses to migraine. But aura is new  - anxiety on going                 Review of Systems  Constitutional, HEENT, cardiovascular, pulmonary, gi and gu " "systems are negative, except as otherwise noted.      Objective    /58 (BP Location: Right arm, Patient Position: Sitting, Cuff Size: Adult Regular)   Pulse 74   Temp 97.5  F (36.4  C) (Tympanic)   Resp 16   Ht 1.626 m (5' 4\")   Wt 51.4 kg (113 lb 6 oz)   SpO2 98%   BMI 19.46 kg/m    Body mass index is 19.46 kg/m .  Physical Exam   GENERAL: alert and no distress  EYES: Eyes grossly normal to inspection, PERRL and conjunctivae and sclerae normal  HENT: ear canals and TM's normal, nose and mouth without ulcers or lesions  RESP: lungs clear to auscultation - no rales, rhonchi or wheezes  CV: regular rate and rhythm, normal S1 S2, no S3 or S4, no murmur, click or rub, no peripheral edema   NEURO: Normal strength and tone, mentation intact, cranial nerves 2-12 intact, and gait normal, no pronator drift   PSYCH: mentation appears normal, affect normal/bright    Admission on 10/13/2024, Discharged on 10/13/2024   Component Date Value Ref Range Status    CRP Inflammation 10/13/2024 <3.00  <5.00 mg/L Final    Erythrocyte Sedimentation Rate 10/13/2024 1  0 - 20 mm/hr Final    Sodium 10/13/2024 141  135 - 145 mmol/L Final    Potassium 10/13/2024 3.6  3.4 - 5.3 mmol/L Final    Carbon Dioxide (CO2) 10/13/2024 26  22 - 29 mmol/L Final    Anion Gap 10/13/2024 8  7 - 15 mmol/L Final    Urea Nitrogen 10/13/2024 9.4  6.0 - 20.0 mg/dL Final    Creatinine 10/13/2024 0.78  0.51 - 0.95 mg/dL Final    GFR Estimate 10/13/2024 >90  >60 mL/min/1.73m2 Final    eGFR calculated using 2021 CKD-EPI equation.    Calcium 10/13/2024 9.2  8.8 - 10.4 mg/dL Final    Reference intervals for this test were updated on 7/16/2024 to reflect our healthy population more accurately. There may be differences in the flagging of prior results with similar values performed with this method. Those prior results can be interpreted in the context of the updated reference intervals.    Chloride 10/13/2024 107  98 - 107 mmol/L Final    Glucose 10/13/2024 " 90  70 - 99 mg/dL Final    Alkaline Phosphatase 10/13/2024 68  40 - 150 U/L Final    AST 10/13/2024 17  0 - 45 U/L Final    ALT 10/13/2024 11  0 - 50 U/L Final    Protein Total 10/13/2024 7.0  6.4 - 8.3 g/dL Final    Albumin 10/13/2024 4.6  3.5 - 5.2 g/dL Final    Bilirubin Total 10/13/2024 0.4  <=1.2 mg/dL Final    WBC Count 10/13/2024 6.2  4.0 - 11.0 10e3/uL Final    RBC Count 10/13/2024 4.56  3.80 - 5.20 10e6/uL Final    Hemoglobin 10/13/2024 14.0  11.7 - 15.7 g/dL Final    Hematocrit 10/13/2024 40.7  35.0 - 47.0 % Final    MCV 10/13/2024 89  78 - 100 fL Final    MCH 10/13/2024 30.7  26.5 - 33.0 pg Final    MCHC 10/13/2024 34.4  31.5 - 36.5 g/dL Final    RDW 10/13/2024 12.5  10.0 - 15.0 % Final    Platelet Count 10/13/2024 197  150 - 450 10e3/uL Final    % Neutrophils 10/13/2024 67  % Final    % Lymphocytes 10/13/2024 24  % Final    % Monocytes 10/13/2024 7  % Final    % Eosinophils 10/13/2024 2  % Final    % Basophils 10/13/2024 1  % Final    % Immature Granulocytes 10/13/2024 0  % Final    NRBCs per 100 WBC 10/13/2024 0  <1 /100 Final    Absolute Neutrophils 10/13/2024 4.1  1.6 - 8.3 10e3/uL Final    Absolute Lymphocytes 10/13/2024 1.5  0.8 - 5.3 10e3/uL Final    Absolute Monocytes 10/13/2024 0.4  0.0 - 1.3 10e3/uL Final    Absolute Eosinophils 10/13/2024 0.1  0.0 - 0.7 10e3/uL Final    Absolute Basophils 10/13/2024 0.1  0.0 - 0.2 10e3/uL Final    Absolute Immature Granulocytes 10/13/2024 0.0  <=0.4 10e3/uL Final    Absolute NRBCs 10/13/2024 0.0  10e3/uL Final     MRA Brain ([place]) wo Contrast    Result Date: 10/15/2024  MRA BRAIN ([place] [facility] [place]) W/O CONTRAST HISTORY: Headache; Neurologic deficit, non-traumatic; Visual symptoms; Neurologic deficit onset <= 24 hours; No known/automatically detected potential contraindications to iodinated contrast; Headache; Vision changes TECHNIQUE:  3-D time-of-flight noncontrast MRA of the brain was performed.  Source and MIP images were reviewed. COMPARISON:   None. FINDINGS:  The distal cervical, petrous, cavernous, and supraclinoid internal carotid arteries appear patent. The A1 segments are symmetric. An anterior communicating artery is present. The distal EMMA vessels are unremarkable. The M1 segments, MCA bifurcations and distal MCA vessels are patent.  The basilar and vertebral arteries have a normal caliber. The PCA and SCA branches demonstrate no focal abnormalities.      IMPRESSION:  Negative MRA head. MATTHEW CALDERA MD   SYSTEM ID:  [id number]    MR Brain w/o & w Contrast    Result Date: 10/15/2024  EXAM:  MR BRAIN W/O & W CONTRAST HISTORY:  Headache; Neurologic deficit, non-traumatic; Visual symptoms; Neurologic deficit onset <= 24 hours; No known/automatically detected potential contraindications to iodinated contrast; Headache; Vision changes. . TECHNIQUE:  Sagittal T1, axial T1, T2, FLAIR, diffusion, gradient as well as axial and 3D postcontrast imaging of the whole brain was performed. MEDS/CONTRAST: Dotare[place] 11 mL COMPARISON:  10/13/24. FINDINGS:  The ventricles and sulci are normal. No abnormal extra-axial collection, hydrocephalus, mass effect or midline shift is seen. The basal cisterns are preserved. The parenchymal signal is within normal limits. T1 shortening previously seen on a single image lung margin the third ventricle is not redemonstrated. No abnormal intracranial enhancement or concerning T2* gradient susceptibility is identified. No restricted diffusion is present. The major intracranial vascular flow voids are preserved. The T1 marrow signal is unremarkable. The orbits are intact. Mild paranasal sinus mucosal thickening is seen.     IMPRESSION: Negative MR brain. Consider dedicated MR orbits if indicated after formal ophthalmologic exam. MATTHEW CALDERA MD   SYSTEM ID:  [id number]    CTA Head Neck with Contrast    Result Date: 10/13/2024  CT ANGIOGRAPHY OF THE BRAIN AND NECK HISTORY: . headache, vision changes. TECHNIQUE: Following the  administration of intravenous contrast, thin helical CT angiography images of the brain were obtained. Postcontrast helical thin CT angiography images of the neck were obtained.  NASCET criteria were applied. Source, multiplanar and 3D MIP reformatted images were reviewed.  This CT exam was performed using one or more the following dose reduction techniques: automated exposure control, adjustment of the mA and/or kV according to patient size, and/or iterative reconstruction technique. COMPARISON: None. FINDINGS: CTA Brain:  The petrous, cavernous, and supraclinoid internal carotid arteries are patent. The A1 segments are symmetric. An anterior communicating artery is present. The distal EMMA vessels are unremarkable. The M1 segments, MCA bifurcations and distal MCA vessels are patent. The basilar and vertebral arteries are patent. The PCA and SCA branches demonstrate no focal abnormalities.  CTA Neck: A 3 vessel aortic arch is present. The innominate and bilateral subclavian arteries are grossly patent. The common carotid arteries demonstrate preserved caliber. The carotid bulbs demonstrate no measurable stenosis. The bilateral internal carotid arteries demonstrate no evidence of flow-limiting stenosis or occlusion. The vertebral arteries arise from the subclavian arteries. There is no evidence of flow-limiting stenosis or occlusion of the vertebral arteries. No mass or pneumothorax is seen at the apices. Multilevel degenerative changes are seen in the cervical spine.     IMPRESSION: No intracranial arterial occlusion, flow-limiting stenosis or aneurysm. No evidence of flow-limiting stenosis, dissection, or occlusion of the cervical carotid or vertebral arteries.  MATTHEW CALDERA MD   SYSTEM ID:  [id number]    CT Head w/o Contrast    Result Date: 10/13/2024  PROCEDURE: CT HEAD W/O CONTRAST HISTORY: headache, vision changes. COMPARISON: None. TECHNIQUE:  Helical images of the head from the foramen magnum to the  vertex were obtained without contrast. This CT exam was performed using one or more the following dose reduction techniques: automated exposure control, adjustment of the mA and/or kV according to patient size, and/or iterative reconstruction technique. FINDINGS: The ventricles and sulci are normal in volume. No acute intracranial hemorrhage, mass effect, midline shift, hydrocephalus or basilar cystern effacement are present. The grey-white matter interface is preserved. The calvarium is intact. The mastoid air cells are clear.  The visualized paranasal sinuses are clear.     IMPRESSION: No acute intracranial hemorrhage or CT evidence of acute transcortical ischemia.  MATTHEW CALDERA MD   SYSTEM ID:  [id number]         Signed Electronically by: Florencia Bennett MD  Answers submitted by the patient for this visit:  Patient Health Questionnaire (Submitted on 10/16/2024)  If you checked off any problems, how difficult have these problems made it for you to do your work, take care of things at home, or get along with other people?: Not difficult at all  PHQ9 TOTAL SCORE: 5  Patient Health Questionnaire (G7) (Submitted on 10/16/2024)  JASON 7 TOTAL SCORE: 10

## 2024-10-16 NOTE — NURSING NOTE
"Chief Complaint   Patient presents with    Follow Up     [facility] ER 10/13/24 right temple migraines, blurry, blackout vision disturbances x 5 days       Initial /58 (BP Location: Right arm, Patient Position: Sitting, Cuff Size: Adult Regular)   Pulse 74   Temp 97.5  F (36.4  C) (Tympanic)   Resp 16   Ht 1.626 m (5' 4\")   Wt 51.4 kg (113 lb 6 oz)   SpO2 98%   BMI 19.46 kg/m   Estimated body mass index is 19.46 kg/m  as calculated from the following:    Height as of this encounter: 1.626 m (5' 4\").    Weight as of this encounter: 51.4 kg (113 lb 6 oz).  Medication Review: complete    The next two questions are to help us understand your food security.  If you are feeling you need any assistance in this area, we have resources available to support you today.          11/27/2023   SDOH- Food Insecurity   Within the past 12 months, did you worry that your food would run out before you got money to buy more? N   Within the past 12 months, did the food you bought just not last and you didn t have money to get more? N            Health Care Directive:  Patient does not have a Health Care Directive or Living Will: Discussed advance care planning with patient; however, patient declined at this time.    Kimberly Alva LPN      "

## 2024-10-28 ENCOUNTER — THERAPY VISIT (OUTPATIENT)
Dept: PHYSICAL THERAPY | Facility: OTHER | Age: 34
End: 2024-10-28
Attending: STUDENT IN AN ORGANIZED HEALTH CARE EDUCATION/TRAINING PROGRAM
Payer: COMMERCIAL

## 2024-10-28 DIAGNOSIS — M25.552 HIP PAIN, LEFT: ICD-10-CM

## 2024-10-28 DIAGNOSIS — M54.16 LUMBAR BACK PAIN WITH RADICULOPATHY AFFECTING LEFT LOWER EXTREMITY: Primary | Chronic | ICD-10-CM

## 2024-10-28 PROCEDURE — 97162 PT EVAL MOD COMPLEX 30 MIN: CPT | Mod: GP

## 2024-10-28 PROCEDURE — 97110 THERAPEUTIC EXERCISES: CPT | Mod: GP

## 2024-10-28 PROCEDURE — 97140 MANUAL THERAPY 1/> REGIONS: CPT | Mod: GP

## 2024-10-29 PROBLEM — M54.16 LUMBAR BACK PAIN WITH RADICULOPATHY AFFECTING LEFT LOWER EXTREMITY: Chronic | Status: ACTIVE | Noted: 2024-10-29

## 2024-10-29 PROBLEM — M25.552 HIP PAIN, LEFT: Status: ACTIVE | Noted: 2024-10-29

## 2024-10-29 NOTE — PROGRESS NOTES
PHYSICAL THERAPY EVALUATION  Type of Visit: Evaluation    Subjective         Presenting condition or subjective complaint:  Priscilla comes in for left lower extremity radicular symptoms. Pain started in her low back after having son almost 3 years ago (1/18/22). Pain starts where she had the epidural. It progressed after a long car ride in October 2023 and now the pain goes down her left lateral leg into the foot with foot numbness. Her leg will give out. It's hard to sleep at night no matter how she lays it bothers and can't reposition. Started in just hip, and leg would buckle but the longer it's been going, the worse it is. R is fine. Work at [employer] on the floor all the time, sometimes if she bends down, her leg doesn't want to support her to get back up. If she tries to get up quick it will buckle. 4 kids so she also is nervous about falling. Can't carry 3 year old son because it hurts too much. Scarey sometimes. Gives out when going up steps. Sometimes when she is picking up a box of ice cream she gets instant pain in her back or leg and can't lift it. Hip doctor did say she had a spur but doesn't think that's the cause of most of her pain. She also suffers from migraines that have been worse recently so on a daily med now and break through med. Migraines since she was 18, she usulally tries to work through it, but one into ED when numb down face and arm with vision cut. Migraines would try calm environment to manage but they are more frequent now and she's not able to manage on her own. She doesn't like to take pain meds, does use and Tylenol precise rub on the spot on her hip that hurts the most.   Date of onset: 01/18/22    Relevant medical history:     Patient Active Problem List   Diagnosis    Disorder of ovary    Colloid cyst of third ventricle (H)    Need for Tdap vaccination    Rh negative status during pregnancy    Supervision of normal first pregnancy    Marijuana use    Nausea    Rh negative state  in antepartum period    SGA (small for gestational age)    Encounter for triage in pregnant patient    Normal labor    Chronic midline low back pain with bilateral sciatica    JASON (generalized anxiety disorder)    PTSD (post-traumatic stress disorder)   Imagin24: MRI Lumbar spine: FINDINGS: No acute compression fracture or malalignment is seen. Marrow signal intensity is within normal limits. No significant disc bulge or protrusion is seen at any level and distal normal in height and signal intensity. There is very mild facet degenerative change at the L4-5 level. Neural foramina all appear widely patent. IMPRESSION: Mild degenerative disease in lower lumbar facet joints. No central stenosis, significant degenerative disc disease or foraminal narrowing.   27: Lumbar spine X-ray: FINDINGS: Alignment is normal. Vertebral body height is maintained throughout. There is no evidence of subluxation or fracture. IMPRESSION: No evidence of fracture or subluxation of the lumbar spine.  24: Hip/pelvis X-ray: FINDINGS: The sacral iliac joints and pubic symphysis are congruent. The left hip is congruent. There is no evidence of fracture or dislocation. There is mild joint space narrowing and marginal osteophytic change of the superior femoral head neck junction. This can predispose to cam-type femoral acetabular impingement. IMPRESSION: Mild osteoarthritic change of the left hip, consider possibility of femoral acetabular impingement. No acute changes. No evidence of fracture.    Prior Level of Function: Independent    Living Environment  Social support:   lives with significant other Toby and 4 kids  Employment:    full time day  at      Objective   LUMBAR SPINE EVALUATION  PAIN: 0-10/10 sharp, aching, shooting, stabbing. Pain is all the time, worse with sitting, walking, rest, certain positions, better with nothing.  POSTURE: Forward flexed posture with increased kyphosis and forward head  posture but flexible and able to correct posture with verbal cues  GAIT: WFL  ROM: trunk flex and extension mild limitation at endrange, rotation within functional limits, sidebending within functional limits however right sidebending does reproduce pulling in left low back and lateral thigh  STRENGTH: R 5/5, L hip 3+/5 flex causes pinch in back, abd pain over greater troch, hip add shakey, knee flex and ext: 3+/5, DF 3+/5, PF 4/5   NEURAL TENSION: SLR positive for nerve pain and limited range of motion on left compared to right  PALPATION: pain and tenderness in L piriformis and lumbar paraspinals    Assessment & Plan   CLINICAL IMPRESSIONS  Medical Diagnosis: M25.552 (ICD-10-CM) - Hip pain, left; left sided lumbar back pain    Treatment Diagnosis: LBP with L radicular sx   Impression/Assessment: Patient is a 34 year old female with left lumbar radiculopathy.  The following significant findings have been identified: Pain, Decreased ROM/flexibility, Decreased strength, Impaired balance, Impaired sensation, Impaired gait, Impaired muscle performance, Decreased activity tolerance, Impaired posture, and Instability. These impairments interfere with their ability to perform self care tasks, work tasks, recreational activities, household chores, driving , household mobility, and community mobility as compared to previous level of function.     Clinical Decision Making (Complexity):  Clinical Presentation: Evolving/Changing  Clinical Presentation Rationale: based on medical and personal factors listed in PT evaluation  Clinical Decision Making (Complexity): Moderate complexity    PLAN OF CARE  Treatment Interventions:  Modalities: Mechanical Traction, Ultrasound  Interventions: Manual Therapy, Neuromuscular Re-education, Therapeutic Exercise, Aquatic Therapy    Long Term Goals     PT Goal 1  Goal Identifier: Radicular symptoms  Goal Description: Patient to report pain traveling no further than her left knee with bending  and lifting activities within 8 weeks.  Target Date: 12/23/24  PT Goal 2  Goal Identifier: Lifting and carrying  Goal Description: Patient to report low back and radicular pain no more than 4 out of 10 with heavy lifting and movement activities at work and with carrying her 3-year-old son within 12 weeks.  Target Date: 01/20/25  PT Goal 3  Goal Identifier: HEP  Goal Description: Patient independent with further progression of home exercise program to manage residual symptoms and continue to strengthen her back and lower extremity within 12 weeks.  Target Date: 01/20/25      Frequency of Treatment: 2x/wk then 1x/wk  Duration of Treatment: 12 weeks    Education Assessment:   Education Comments: [employer] code: [id number]    Risks and benefits of evaluation/treatment have been explained.   Patient/Family/caregiver agrees with Plan of Care.     Evaluation Time:     PT Eval, Moderate Complexity Minutes (65351): 30     Signing Clinician: Sara Galeas DPT        [facility]                                                                                   OUTPATIENT PHYSICAL THERAPY      PLAN OF TREATMENT FOR OUTPATIENT REHABILITATION   Patient's Last Name, First Name, Priscilla Mckeon YOB: 1990   Provider's Name   [facility]   Medical Record No.  [id number]     Onset Date: 01/18/22  Start of Care Date: 10/28/24     Medical Diagnosis:  M25.552 (ICD-10-CM) - Hip pain, left; left sided lumbar back pain      PT Treatment Diagnosis:  LBP with L radicular sx Plan of Treatment  Frequency/Duration: 2x/wk then 1x/wk/ 12 weeks    Certification date from 10/28/24 to 01/20/25         See note for plan of treatment details and functional goals     Sara Galeas DPT                         I CERTIFY THE NEED FOR THESE SERVICES FURNISHED UNDER        THIS PLAN OF TREATMENT AND WHILE UNDER MY CARE     (Physician attestation of this document indicates  review and certification of the therapy plan).              Referring Provider:  JOHN Hobbs/ PCP Dr. Albania Bennett    Initial Assessment  See Epic Evaluation- Start of Care Date: 10/28/24

## 2024-10-31 ENCOUNTER — THERAPY VISIT (OUTPATIENT)
Dept: PHYSICAL THERAPY | Facility: OTHER | Age: 34
End: 2024-10-31
Attending: STUDENT IN AN ORGANIZED HEALTH CARE EDUCATION/TRAINING PROGRAM
Payer: COMMERCIAL

## 2024-10-31 DIAGNOSIS — M54.16 LUMBAR BACK PAIN WITH RADICULOPATHY AFFECTING LEFT LOWER EXTREMITY: Primary | Chronic | ICD-10-CM

## 2024-10-31 DIAGNOSIS — M25.552 HIP PAIN, LEFT: ICD-10-CM

## 2024-10-31 PROCEDURE — 97110 THERAPEUTIC EXERCISES: CPT | Mod: GP

## 2024-10-31 PROCEDURE — 97140 MANUAL THERAPY 1/> REGIONS: CPT | Mod: GP

## 2025-01-04 ENCOUNTER — HEALTH MAINTENANCE LETTER (OUTPATIENT)
Age: 35
End: 2025-01-04

## 2025-01-15 ENCOUNTER — ALLIED HEALTH/NURSE VISIT (OUTPATIENT)
Dept: FAMILY MEDICINE | Facility: OTHER | Age: 35
End: 2025-01-15
Attending: STUDENT IN AN ORGANIZED HEALTH CARE EDUCATION/TRAINING PROGRAM
Payer: COMMERCIAL

## 2025-01-15 DIAGNOSIS — Z30.42 ENCOUNTER FOR DEPO-PROVERA CONTRACEPTION: Primary | ICD-10-CM

## 2025-01-15 PROCEDURE — 96372 THER/PROPH/DIAG INJ SC/IM: CPT | Performed by: STUDENT IN AN ORGANIZED HEALTH CARE EDUCATION/TRAINING PROGRAM

## 2025-01-15 PROCEDURE — 250N000011 HC RX IP 250 OP 636: Mod: JZ | Performed by: STUDENT IN AN ORGANIZED HEALTH CARE EDUCATION/TRAINING PROGRAM

## 2025-01-15 RX ORDER — MEDROXYPROGESTERONE ACETATE 150 MG/ML
150 INJECTION, SUSPENSION INTRAMUSCULAR
Status: ACTIVE | OUTPATIENT
Start: 2025-01-15

## 2025-01-15 RX ADMIN — MEDROXYPROGESTERONE ACETATE 150 MG: 150 INJECTION, SUSPENSION INTRAMUSCULAR at 15:01

## 2025-01-15 NOTE — PROGRESS NOTES
Clinic Administered Medication Documentation      Depo Provera Documentation    Depo-Provera Standing Order inclusion/exclusion criteria reviewed.     Is this the initial or subsequent dose of Depo Provera? Subsequent dose - patient is within the acceptable window of time (11-15 weeks) for subsequent injection. Pregnancy test not indicated.    Patient meets: inclusion criteria     Is there an active order (written within the past 365 days, with administrations remaining, not ) in the chart? Yes.     Prior to injection, verified patient identity using patient's name and date of birth. Medication was administered. Please see MAR and medication order for additional information.     Vial/Syringe: Single dose vial. Was entire vial of medication used? Yes    Patient instructed to remain in clinic for 15 minutes and report any adverse reaction to staff immediately.  NEXT INJECTION DUE: 25 - 25    Verified that the patient has refills remaining in their prescription.  Cam Grijalva RN on 1/15/2025 at 3:00 PM

## 2025-05-12 ENCOUNTER — ALLIED HEALTH/NURSE VISIT (OUTPATIENT)
Dept: FAMILY MEDICINE | Facility: OTHER | Age: 35
End: 2025-05-12
Attending: STUDENT IN AN ORGANIZED HEALTH CARE EDUCATION/TRAINING PROGRAM
Payer: COMMERCIAL

## 2025-05-12 DIAGNOSIS — Z30.42 ENCOUNTER FOR DEPO-PROVERA CONTRACEPTION: Primary | ICD-10-CM

## 2025-05-12 LAB — HCG UR QL: NEGATIVE

## 2025-05-12 PROCEDURE — 96372 THER/PROPH/DIAG INJ SC/IM: CPT | Performed by: STUDENT IN AN ORGANIZED HEALTH CARE EDUCATION/TRAINING PROGRAM

## 2025-05-12 PROCEDURE — 81025 URINE PREGNANCY TEST: CPT | Mod: ZL

## 2025-05-12 PROCEDURE — 250N000011 HC RX IP 250 OP 636: Mod: JZ | Performed by: STUDENT IN AN ORGANIZED HEALTH CARE EDUCATION/TRAINING PROGRAM

## 2025-05-12 RX ADMIN — MEDROXYPROGESTERONE ACETATE 150 MG: 150 INJECTION, SUSPENSION INTRAMUSCULAR at 13:16

## 2025-05-12 NOTE — PROGRESS NOTES
Clinic Administered Medication Documentation      Depo Provera Documentation    Depo-Provera Standing Order inclusion/exclusion criteria reviewed.     Is this the initial or subsequent dose of Depo Provera? Subsequent dose - patient is not within the acceptable window of time (11-15 weeks) for subsequent injection. Pregnancy test is indicated. Pregnancy test result: negative       Patient meets: inclusion criteria     Is there an active order (written within the past 365 days, with administrations remaining, not ) in the chart? Yes.     Prior to injection, verified patient identity using patient's name and date of birth. Medication was administered. Please see MAR and medication order for additional information.     Vial/Syringe: Single dose vial. Was entire vial of medication used? Yes    Patient instructed to remain in clinic for 15 minutes and report any adverse reaction to staff immediately.  NEXT INJECTION DUE: 25 - 25    Verified that the patient has refills remaining in their prescription.

## 2025-08-07 ENCOUNTER — OFFICE VISIT (OUTPATIENT)
Dept: FAMILY MEDICINE | Facility: OTHER | Age: 35
End: 2025-08-07
Attending: STUDENT IN AN ORGANIZED HEALTH CARE EDUCATION/TRAINING PROGRAM
Payer: COMMERCIAL

## 2025-08-07 VITALS
BODY MASS INDEX: 20.76 KG/M2 | RESPIRATION RATE: 16 BRPM | DIASTOLIC BLOOD PRESSURE: 79 MMHG | SYSTOLIC BLOOD PRESSURE: 124 MMHG | HEIGHT: 64 IN | TEMPERATURE: 98.6 F | WEIGHT: 121.6 LBS | OXYGEN SATURATION: 98 % | HEART RATE: 110 BPM

## 2025-08-07 DIAGNOSIS — M54.50 ACUTE MIDLINE LOW BACK PAIN WITHOUT SCIATICA: ICD-10-CM

## 2025-08-07 DIAGNOSIS — Z30.42 ENCOUNTER FOR DEPO-PROVERA CONTRACEPTION: Primary | ICD-10-CM
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
## 2025-08-07 DIAGNOSIS — M89.8X1 PAIN OF LEFT CLAVICLE: ICD-10-CM

## 2025-08-07 DIAGNOSIS — M25.572 ACUTE LEFT ANKLE PAIN: ICD-10-CM

## 2025-08-07 DIAGNOSIS — S93.412A SPRAIN OF CALCANEOFIBULAR LIGAMENT OF LEFT ANKLE, INITIAL ENCOUNTER: Primary | ICD-10-CM

## 2025-08-07 DIAGNOSIS — M25.562 ACUTE PAIN OF LEFT KNEE: ICD-10-CM

## 2025-08-07 PROCEDURE — G0463 HOSPITAL OUTPT CLINIC VISIT: HCPCS

## 2025-08-07 PROCEDURE — 96372 THER/PROPH/DIAG INJ SC/IM: CPT | Performed by: STUDENT IN AN ORGANIZED HEALTH CARE EDUCATION/TRAINING PROGRAM

## 2025-08-07 PROCEDURE — 250N000011 HC RX IP 250 OP 636: Performed by: STUDENT IN AN ORGANIZED HEALTH CARE EDUCATION/TRAINING PROGRAM

## 2025-08-07 RX ADMIN — MEDROXYPROGESTERONE ACETATE 150 MG: 150 INJECTION, SUSPENSION INTRAMUSCULAR at 11:11

## 2025-08-07 ASSESSMENT — PAIN SCALES - GENERAL: PAINLEVEL_OUTOF10: MODERATE PAIN (6)

## 2025-08-15 ENCOUNTER — HOSPITAL ENCOUNTER (OUTPATIENT)
Dept: GENERAL RADIOLOGY | Facility: OTHER | Age: 35
Discharge: HOME | End: 2025-08-15
Payer: COMMERCIAL

## 2025-08-15 PROCEDURE — 71101 X-RAY EXAM UNILAT RIBS/CHEST: CPT | Mod: LT

## 2025-08-15 PROCEDURE — 71101 X-RAY EXAM UNILAT RIBS/CHEST: CPT | Mod: 26 | Performed by: RADIOLOGY

## (undated) RX ORDER — MEDROXYPROGESTERONE ACETATE 150 MG/ML
INJECTION, SUSPENSION INTRAMUSCULAR
Status: DISPENSED
Start: 2023-07-24

## (undated) RX ORDER — MEDROXYPROGESTERONE ACETATE 150 MG/ML
INJECTION, SUSPENSION INTRAMUSCULAR
Status: DISPENSED
Start: 2024-10-16

## (undated) RX ORDER — MEDROXYPROGESTERONE ACETATE 150 MG/ML
INJECTION, SUSPENSION INTRAMUSCULAR
Status: DISPENSED
Start: 2022-12-06

## (undated) RX ORDER — LIDOCAINE 40 MG/G
CREAM TOPICAL
Status: DISPENSED
Start: 2021-08-17

## (undated) RX ORDER — MEDROXYPROGESTERONE ACETATE 150 MG/ML
INJECTION, SUSPENSION INTRAMUSCULAR
Status: DISPENSED
Start: 2025-08-07

## (undated) RX ORDER — MEDROXYPROGESTERONE ACETATE 150 MG/ML
INJECTION, SUSPENSION INTRAMUSCULAR
Status: DISPENSED
Start: 2024-07-09

## (undated) RX ORDER — KETOROLAC TROMETHAMINE 15 MG/ML
INJECTION, SOLUTION INTRAMUSCULAR; INTRAVENOUS
Status: DISPENSED
Start: 2024-10-13

## (undated) RX ORDER — OXYCODONE HYDROCHLORIDE 5 MG/1
TABLET ORAL
Status: DISPENSED
Start: 2021-08-17

## (undated) RX ORDER — MEDROXYPROGESTERONE ACETATE 150 MG/ML
INJECTION, SUSPENSION INTRAMUSCULAR
Status: DISPENSED
Start: 2022-09-08

## (undated) RX ORDER — ASPIRIN 325 MG
TABLET ORAL
Status: DISPENSED
Start: 2024-10-13

## (undated) RX ORDER — MEDROXYPROGESTERONE ACETATE 150 MG/ML
INJECTION, SUSPENSION INTRAMUSCULAR
Status: DISPENSED
Start: 2025-01-15

## (undated) RX ORDER — MEDROXYPROGESTERONE ACETATE 150 MG/ML
INJECTION, SUSPENSION INTRAMUSCULAR
Status: DISPENSED
Start: 2025-05-12

## (undated) RX ORDER — KETOROLAC TROMETHAMINE 30 MG/ML
INJECTION, SOLUTION INTRAMUSCULAR; INTRAVENOUS
Status: DISPENSED
Start: 2024-07-17

## (undated) RX ORDER — MEDROXYPROGESTERONE ACETATE 150 MG/ML
INJECTION, SUSPENSION INTRAMUSCULAR
Status: DISPENSED
Start: 2022-04-12

## (undated) RX ORDER — MEDROXYPROGESTERONE ACETATE 150 MG/ML
INJECTION, SUSPENSION INTRAMUSCULAR
Status: DISPENSED
Start: 2023-03-15

## (undated) RX ORDER — CYCLOBENZAPRINE HCL 10 MG
TABLET ORAL
Status: DISPENSED
Start: 2021-08-17

## (undated) RX ORDER — MEDROXYPROGESTERONE ACETATE 150 MG/ML
INJECTION, SUSPENSION INTRAMUSCULAR
Status: DISPENSED
Start: 2023-10-31